# Patient Record
Sex: FEMALE | Race: WHITE | Employment: UNEMPLOYED | URBAN - METROPOLITAN AREA
[De-identification: names, ages, dates, MRNs, and addresses within clinical notes are randomized per-mention and may not be internally consistent; named-entity substitution may affect disease eponyms.]

---

## 2017-05-09 ENCOUNTER — ANESTHESIA (OUTPATIENT)
Dept: ENDOSCOPY | Age: 82
End: 2017-05-09
Payer: MEDICARE

## 2017-05-09 ENCOUNTER — ANESTHESIA EVENT (OUTPATIENT)
Dept: ENDOSCOPY | Age: 82
End: 2017-05-09
Payer: MEDICARE

## 2017-05-09 ENCOUNTER — HOSPITAL ENCOUNTER (OUTPATIENT)
Age: 82
Setting detail: OUTPATIENT SURGERY
Discharge: HOME | End: 2017-05-09
Attending: INTERNAL MEDICINE | Admitting: INTERNAL MEDICINE
Payer: MEDICARE

## 2017-05-09 VITALS
SYSTOLIC BLOOD PRESSURE: 132 MMHG | DIASTOLIC BLOOD PRESSURE: 64 MMHG | OXYGEN SATURATION: 100 % | RESPIRATION RATE: 19 BRPM

## 2017-05-09 VITALS
DIASTOLIC BLOOD PRESSURE: 65 MMHG | HEART RATE: 70 BPM | WEIGHT: 148 LBS | OXYGEN SATURATION: 96 % | SYSTOLIC BLOOD PRESSURE: 125 MMHG | BODY MASS INDEX: 26.22 KG/M2 | HEIGHT: 63 IN | TEMPERATURE: 97.3 F | RESPIRATION RATE: 18 BRPM

## 2017-05-09 LAB — POC POTASSIUM: 3.6 MMOL/L (ref 3.5–5.1)

## 2017-05-09 PROCEDURE — 7100000010 HC PHASE II RECOVERY - FIRST 15 MIN: Performed by: INTERNAL MEDICINE

## 2017-05-09 PROCEDURE — 2580000003 HC RX 258: Performed by: INTERNAL MEDICINE

## 2017-05-09 PROCEDURE — 3609017100 HC EGD: Performed by: INTERNAL MEDICINE

## 2017-05-09 PROCEDURE — 2500000003 HC RX 250 WO HCPCS: Performed by: NURSE ANESTHETIST, CERTIFIED REGISTERED

## 2017-05-09 PROCEDURE — 84132 ASSAY OF SERUM POTASSIUM: CPT

## 2017-05-09 PROCEDURE — 6360000002 HC RX W HCPCS: Performed by: NURSE ANESTHETIST, CERTIFIED REGISTERED

## 2017-05-09 PROCEDURE — 7100000011 HC PHASE II RECOVERY - ADDTL 15 MIN: Performed by: INTERNAL MEDICINE

## 2017-05-09 PROCEDURE — 3700000000 HC ANESTHESIA ATTENDED CARE: Performed by: INTERNAL MEDICINE

## 2017-05-09 RX ORDER — LIDOCAINE HYDROCHLORIDE 10 MG/ML
INJECTION, SOLUTION INFILTRATION; PERINEURAL PRN
Status: DISCONTINUED | OUTPATIENT
Start: 2017-05-09 | End: 2017-05-09 | Stop reason: SDUPTHER

## 2017-05-09 RX ORDER — ONDANSETRON 2 MG/ML
INJECTION INTRAMUSCULAR; INTRAVENOUS PRN
Status: DISCONTINUED | OUTPATIENT
Start: 2017-05-09 | End: 2017-05-09 | Stop reason: SDUPTHER

## 2017-05-09 RX ORDER — SODIUM CHLORIDE 9 MG/ML
INJECTION, SOLUTION INTRAVENOUS CONTINUOUS
Status: DISCONTINUED | OUTPATIENT
Start: 2017-05-09 | End: 2017-05-09 | Stop reason: HOSPADM

## 2017-05-09 RX ADMIN — SODIUM CHLORIDE: 9 INJECTION, SOLUTION INTRAVENOUS at 08:29

## 2017-05-09 RX ADMIN — ONDANSETRON 4 MG: 2 INJECTION, SOLUTION INTRAMUSCULAR; INTRAVENOUS at 08:55

## 2017-05-09 RX ADMIN — LIDOCAINE HYDROCHLORIDE 50 MG: 10 INJECTION, SOLUTION INFILTRATION; PERINEURAL at 08:55

## 2017-05-09 RX ADMIN — SODIUM CHLORIDE 30 ML: 9 INJECTION, SOLUTION INTRAVENOUS at 08:12

## 2017-05-09 ASSESSMENT — PAIN SCALES - GENERAL
PAINLEVEL_OUTOF10: 0

## 2017-05-09 ASSESSMENT — PAIN - FUNCTIONAL ASSESSMENT: PAIN_FUNCTIONAL_ASSESSMENT: 0-10

## 2017-12-31 ENCOUNTER — APPOINTMENT (OUTPATIENT)
Dept: GENERAL RADIOLOGY | Age: 82
End: 2017-12-31
Payer: MEDICARE

## 2017-12-31 ENCOUNTER — HOSPITAL ENCOUNTER (OUTPATIENT)
Age: 82
Setting detail: OBSERVATION
Discharge: HOME | End: 2018-01-01
Attending: EMERGENCY MEDICINE | Admitting: EMERGENCY MEDICINE
Payer: MEDICARE

## 2017-12-31 VITALS
RESPIRATION RATE: 16 BRPM | BODY MASS INDEX: 26.05 KG/M2 | SYSTOLIC BLOOD PRESSURE: 134 MMHG | DIASTOLIC BLOOD PRESSURE: 74 MMHG | WEIGHT: 147 LBS | OXYGEN SATURATION: 95 % | HEIGHT: 63 IN | TEMPERATURE: 98.5 F | HEART RATE: 91 BPM

## 2017-12-31 DIAGNOSIS — R55 NEAR SYNCOPE: ICD-10-CM

## 2017-12-31 DIAGNOSIS — R73.9 HYPERGLYCEMIA: ICD-10-CM

## 2017-12-31 DIAGNOSIS — R07.9 CHEST PAIN, UNSPECIFIED TYPE: Primary | ICD-10-CM

## 2017-12-31 LAB
ABSOLUTE EOS #: 0.04 K/UL (ref 0–0.44)
ABSOLUTE IMMATURE GRANULOCYTE: 0.05 K/UL (ref 0–0.3)
ABSOLUTE LYMPH #: 1.33 K/UL (ref 1.1–3.7)
ABSOLUTE MONO #: 0.5 K/UL (ref 0.1–1.2)
ANION GAP SERPL CALCULATED.3IONS-SCNC: 17 MMOL/L (ref 9–17)
BASOPHILS # BLD: 2 % (ref 0–2)
BASOPHILS ABSOLUTE: 0.09 K/UL (ref 0–0.2)
BUN BLDV-MCNC: 27 MG/DL (ref 8–23)
BUN/CREAT BLD: ABNORMAL (ref 9–20)
CALCIUM SERPL-MCNC: 8.8 MG/DL (ref 8.6–10.4)
CHLORIDE BLD-SCNC: 95 MMOL/L (ref 98–107)
CO2: 25 MMOL/L (ref 20–31)
CREAT SERPL-MCNC: 1.29 MG/DL (ref 0.5–0.9)
DIFFERENTIAL TYPE: ABNORMAL
EOSINOPHILS RELATIVE PERCENT: 1 % (ref 1–4)
GFR AFRICAN AMERICAN: 48 ML/MIN
GFR NON-AFRICAN AMERICAN: 39 ML/MIN
GFR SERPL CREATININE-BSD FRML MDRD: ABNORMAL ML/MIN/{1.73_M2}
GFR SERPL CREATININE-BSD FRML MDRD: ABNORMAL ML/MIN/{1.73_M2}
GLUCOSE BLD-MCNC: 159 MG/DL (ref 70–99)
HCT VFR BLD CALC: 38.5 % (ref 36.3–47.1)
HEMOGLOBIN: 12.4 G/DL (ref 11.9–15.1)
IMMATURE GRANULOCYTES: 1 %
LYMPHOCYTES # BLD: 23 % (ref 24–43)
MCH RBC QN AUTO: 30.4 PG (ref 25.2–33.5)
MCHC RBC AUTO-ENTMCNC: 32.2 G/DL (ref 28.4–34.8)
MCV RBC AUTO: 94.4 FL (ref 82.6–102.9)
MONOCYTES # BLD: 9 % (ref 3–12)
PDW BLD-RTO: 12.8 % (ref 11.8–14.4)
PLATELET # BLD: 247 K/UL (ref 138–453)
PLATELET ESTIMATE: ABNORMAL
PMV BLD AUTO: 10.2 FL (ref 8.1–13.5)
POC TROPONIN I: 0 NG/ML (ref 0–0.1)
POC TROPONIN I: 0 NG/ML (ref 0–0.1)
POC TROPONIN INTERP: NORMAL
POC TROPONIN INTERP: NORMAL
POTASSIUM SERPL-SCNC: 3.7 MMOL/L (ref 3.7–5.3)
RBC # BLD: 4.08 M/UL (ref 3.95–5.11)
RBC # BLD: ABNORMAL 10*6/UL
SEG NEUTROPHILS: 64 % (ref 36–65)
SEGMENTED NEUTROPHILS ABSOLUTE COUNT: 3.67 K/UL (ref 1.5–8.1)
SODIUM BLD-SCNC: 137 MMOL/L (ref 135–144)
WBC # BLD: 5.7 K/UL (ref 3.5–11.3)
WBC # BLD: ABNORMAL 10*3/UL

## 2017-12-31 PROCEDURE — G0378 HOSPITAL OBSERVATION PER HR: HCPCS

## 2017-12-31 PROCEDURE — 93005 ELECTROCARDIOGRAM TRACING: CPT

## 2017-12-31 PROCEDURE — 2580000003 HC RX 258: Performed by: EMERGENCY MEDICINE

## 2017-12-31 PROCEDURE — 85025 COMPLETE CBC W/AUTO DIFF WBC: CPT

## 2017-12-31 PROCEDURE — 84484 ASSAY OF TROPONIN QUANT: CPT

## 2017-12-31 PROCEDURE — 6370000000 HC RX 637 (ALT 250 FOR IP): Performed by: EMERGENCY MEDICINE

## 2017-12-31 PROCEDURE — 71020 XR CHEST STANDARD TWO VW: CPT

## 2017-12-31 PROCEDURE — 80048 BASIC METABOLIC PNL TOTAL CA: CPT

## 2017-12-31 PROCEDURE — 99285 EMERGENCY DEPT VISIT HI MDM: CPT

## 2017-12-31 PROCEDURE — 94762 N-INVAS EAR/PLS OXIMTRY CONT: CPT

## 2017-12-31 RX ORDER — HYDROCHLOROTHIAZIDE 25 MG/1
25 TABLET ORAL DAILY
Status: DISCONTINUED | OUTPATIENT
Start: 2017-12-31 | End: 2018-01-01

## 2017-12-31 RX ORDER — NORTRIPTYLINE HYDROCHLORIDE 10 MG/1
10 CAPSULE ORAL NIGHTLY
Status: DISCONTINUED | OUTPATIENT
Start: 2017-12-31 | End: 2018-01-01 | Stop reason: HOSPADM

## 2017-12-31 RX ORDER — AMLODIPINE BESYLATE 10 MG/1
10 TABLET ORAL DAILY
Status: DISCONTINUED | OUTPATIENT
Start: 2017-12-31 | End: 2018-01-01 | Stop reason: HOSPADM

## 2017-12-31 RX ORDER — ONDANSETRON 4 MG/1
4 TABLET, FILM COATED ORAL EVERY 8 HOURS PRN
Status: DISCONTINUED | OUTPATIENT
Start: 2017-12-31 | End: 2018-01-01 | Stop reason: HOSPADM

## 2017-12-31 RX ORDER — ALPRAZOLAM 0.25 MG/1
0.25 TABLET ORAL NIGHTLY PRN
Status: DISCONTINUED | OUTPATIENT
Start: 2017-12-31 | End: 2018-01-01 | Stop reason: HOSPADM

## 2017-12-31 RX ORDER — LANOLIN ALCOHOL/MO/W.PET/CERES
325 CREAM (GRAM) TOPICAL 2 TIMES DAILY WITH MEALS
Status: DISCONTINUED | OUTPATIENT
Start: 2017-12-31 | End: 2018-01-01 | Stop reason: HOSPADM

## 2017-12-31 RX ORDER — ALPRAZOLAM 0.5 MG/1
0.25 TABLET ORAL NIGHTLY PRN
COMMUNITY

## 2017-12-31 RX ORDER — SODIUM CHLORIDE 0.9 % (FLUSH) 0.9 %
10 SYRINGE (ML) INJECTION PRN
Status: DISCONTINUED | OUTPATIENT
Start: 2017-12-31 | End: 2018-01-01 | Stop reason: HOSPADM

## 2017-12-31 RX ORDER — SODIUM CHLORIDE 0.9 % (FLUSH) 0.9 %
10 SYRINGE (ML) INJECTION EVERY 12 HOURS SCHEDULED
Status: DISCONTINUED | OUTPATIENT
Start: 2017-12-31 | End: 2018-01-01 | Stop reason: HOSPADM

## 2017-12-31 RX ORDER — ACETAMINOPHEN 325 MG/1
650 TABLET ORAL EVERY 4 HOURS PRN
Status: DISCONTINUED | OUTPATIENT
Start: 2017-12-31 | End: 2018-01-01 | Stop reason: HOSPADM

## 2017-12-31 RX ADMIN — FERROUS SULFATE TAB EC 325 MG (65 MG FE EQUIVALENT) 325 MG: 325 (65 FE) TABLET DELAYED RESPONSE at 18:23

## 2017-12-31 RX ADMIN — NORTRIPTYLINE HYDROCHLORIDE 10 MG: 10 CAPSULE ORAL at 22:55

## 2017-12-31 RX ADMIN — SODIUM CHLORIDE: 9 INJECTION, SOLUTION INTRAVENOUS at 16:35

## 2017-12-31 ASSESSMENT — HEART SCORE: ECG: 1

## 2017-12-31 NOTE — ED PROVIDER NOTES
[facility] ED  Emergency Department Encounter  Emergency Medicine Resident     Pt Name: Bethany Mills  MRN: [id number]  [place] 1934  Date of evaluation: 12/31/17  PCP:  Bruce Su MD    CHIEF COMPLAINT       Chief Complaint   Patient presents with    Chest Pain     onset this morning while getting ready to go to [employer]. it only last a few minutes. HISTORY OF PRESENT ILLNESS  (Location/Symptom, Timing/Onset, Context/Setting, Quality, Duration, Modifying Factors, Severity.)      Bethany Mills is a 80 y.o. female who presents with Complaints of pressure-like chest pain that started approximately 9:00 this morning. Patient said her episode lasted approximately 3 minutes and then resolve spontaneously. He had no alleviating or exacerbating factors. Patient says that she was sitting at home when the pain started. She denied any pain radiation. She denied any associated shortness of breath, nausea, vomiting, or abdominal pain. She denies any recent illness, cough, fevers, or chills. She has no significant past cardiac history. She was noted to have a normal stress test in 2014. She has a past medical history significant for hyperlipidemia and hypertension as well as chronic gastritis. Daughter states the patient has been having episodes of dizziness and near syncope over the past few days as well. PAST MEDICAL / SURGICAL / SOCIAL / FAMILY HISTORY      has a past medical history of Anemia; Anxiety; Depression; Fibromyalgia; Gastritis; GERD (gastroesophageal reflux disease); Hyperlipidemia; Hypertension; Lumbar disc disease; Neuropathy ([address] Numbness; Osteoarthritis; Watermelon stomach; and Wears glasses. has a past surgical history that includes Cholecystectomy; nasal endoscopy; Hemorrhoid surgery; back surgery (6/17/13); Cataract removal; Upper gastrointestinal endoscopy; and esophagogastroduodenoscopy transoral diagnostic (N/A, 5/9/2017).     Social History patient to ETU for cardiology workup and consultation and likely stress testing. Patient was agreeable to plan for admission. All questions answered. PROCEDURES:  None    Procedures    CONSULTS:  IP CONSULT TO CARDIOLOGY    CRITICAL CARE:  None     FINAL IMPRESSION      1. Chest pain, unspecified type    2. Near syncope    3.  Hyperglycemia          DISPOSITION / PLAN     DISPOSITION Admitted 12/31/2017 01:17:46 PM      PATIENT REFERRED TO:  Lise Buerger, MD  . [facility], [facility]  [phone]            DISCHARGE MEDICATIONS:  New Prescriptions    No medications on file       Jerry Treviño MD  Emergency Medicine Resident    (Please note that portions of this note were completed with a voice recognition program.  Efforts were made to edit the dictations but occasionally words are mis-transcribed.)       Jerry Treviño MD  Resident  12/31/17 8442

## 2018-01-01 PROCEDURE — 2580000003 HC RX 258: Performed by: EMERGENCY MEDICINE

## 2018-01-01 PROCEDURE — 93005 ELECTROCARDIOGRAM TRACING: CPT

## 2018-01-01 PROCEDURE — G0378 HOSPITAL OBSERVATION PER HR: HCPCS

## 2018-01-01 PROCEDURE — 6370000000 HC RX 637 (ALT 250 FOR IP): Performed by: EMERGENCY MEDICINE

## 2018-01-01 RX ORDER — HYDROCHLOROTHIAZIDE 25 MG/1
12.5 TABLET ORAL DAILY
Status: DISCONTINUED | OUTPATIENT
Start: 2018-01-02 | End: 2018-01-01 | Stop reason: HOSPADM

## 2018-01-01 RX ADMIN — Medication 10 ML: at 09:24

## 2018-01-01 RX ADMIN — SODIUM CHLORIDE: 9 INJECTION, SOLUTION INTRAVENOUS at 02:01

## 2018-01-01 RX ADMIN — HYDROCHLOROTHIAZIDE 25 MG: 25 TABLET ORAL at 09:21

## 2018-01-01 RX ADMIN — AMLODIPINE BESYLATE 10 MG: 10 TABLET ORAL at 09:21

## 2018-01-01 RX ADMIN — FERROUS SULFATE TAB EC 325 MG (65 MG FE EQUIVALENT) 325 MG: 325 (65 FE) TABLET DELAYED RESPONSE at 09:21

## 2018-01-01 NOTE — PLAN OF CARE
Problem: Daily Care:  Goal: Daily care needs are met  Daily care needs are met   Outcome: Ongoing      Problem: Pain:  Goal: Patient's pain/discomfort is manageable  Patient's pain/discomfort is manageable   Outcome: Ongoing

## 2018-01-01 NOTE — H&P
12/31/2017  EXAMINATION: TWO VIEWS OF THE CHEST 12/31/2017 11:15 am COMPARISON: Chest x-ray dated 02/25/2016 HISTORY: ORDERING SYSTEM PROVIDED HISTORY: Chest pain TECHNOLOGIST PROVIDED HISTORY: Reason for exam:->Chest pain FINDINGS: Cardiomediastinal silhouette and pulmonary vasculature are within normal limits. No focal airspace consolidation, pneumothorax, or pleural effusion. No free air beneath the diaphragm. No acute osseous abnormality. No acute intrathoracic process. LABS:  I have reviewed and interpreted all available lab results.   Labs Reviewed   CBC WITH AUTO DIFFERENTIAL - Abnormal; Notable for the following:        Result Value    Lymphocytes 23 (*)     Immature Granulocytes 1 (*)     All other components within normal limits   BASIC METABOLIC PANEL - Abnormal; Notable for the following:     Glucose 159 (*)     BUN 27 (*)     CREATININE 1.29 (*)     Chloride 95 (*)     GFR Non- 39 (*)     GFR  48 (*)     All other components within normal limits   POCT TROPONIN   POCT TROPONIN   POCT TROPONIN   POCT TROPONIN           SCREENING TOOLS:    HEART Risk Score for Chest Pain Patients   History and Physical Exam Suspicion Level  (Nausea, Vomiting, Diaphoresis, Radiation, Exertion)   Slightly Suspicious (0 pts)   Moderately Suspicious (1 pt)   Highly Suspicious (2 pts)   EKG Interpretation   Normal (0 pts)   Non-Specific Repolarization Disturbance (1 pt)   Significant ST-Depression (2 pts)   Age of Patient (in years)   = 39 (0 pts)   46-64 (1 pt)   = 65 (2 pts)   Risk Factors   No Risk Factors (0 pts)   1-2 Risk Factors (1 pt)   = 3 Risk Factors (2 pts)   Risk Factors Include:   Hypercholesterolemia   Hypertension   Diabetes Mellitus   Cigarette smoking   Positive family history   Obesity   CAD   (SLE, CKDz, HIV, Cocaine abuse)   Troponin Levels   = Normal Limit (0 pts)   1-3 Times Normal Limit (1 pt)   > 3 Times Normal Limit (2 pts)  TOTAL: 6    Percent Risk for Major

## 2018-01-01 NOTE — DISCHARGE SUMMARY
CDU Discharge Summary        Patient:  Juan Pablo Velasquez  YOB: 1934    MRN: [id number]   Acct: [de-identified]    Primary Care Physician: Soraya Lara MD    Admit date:  12/31/2017 12/31/2017 10:39 AM  Discharge date:  1/1/2018 1/1/2018  3:54 PM     Discharge Diagnoses:   Acute retrosternal chest pressure due to unspecified etiology,  that lasted approximately 30 minutes and resolve spontaneously, the patient will follow up outpatient with cardiology       Stressed to patient the importance of following up with primary care doctor for further workup/management of symptoms. Pt verbalizes understanding and agrees with plan. Discharge Medications:       [facility]   Home Medication Instructions [id number]    Printed on:01/02/18 6777   Medication Information                      ALPRAZolam (XANAX) 0.25 MG tablet  Take 0.25 mg by mouth nightly as needed for Sleep. amLODIPine (NORVASC) 10 MG tablet  Take 10 mg by mouth daily             ferrous sulfate 325 (65 FE) MG tablet  Take 1 tablet by mouth 2 times daily (with meals)             hydrochlorothiazide (HYDRODIURIL) 25 MG tablet  Take 1 tablet by mouth daily. nortriptyline (PAMELOR) 10 MG capsule  Take 10 mg by mouth nightly. potassium chloride SA (K-DUR;KLOR-CON M) 20 MEQ tablet  Take 1 tablet by mouth daily for 10 days                 Diet:    , Advance as tolerated     Activity:  As tolerated    Follow-up:  Call today/tomorrow for a follow up appointment with Soraya Lara MD , other or return to the Emergency Room with worsening symptoms    Consultants: IP CONSULT TO CARDIOLOGY    Procedures:  Not indicated     Diagnostic Test:           Physical Exam:    General appearance - NAD, AOx 3  Lungs - CTA Bilat  Heart - RRR no M/G/R  Abdomen - Soft NT/ND  Neurological -  No focal motor or sensory weakness. Extremities - Cap refil <2 sec in all ext.   Skin - warm, dry      Hospital Course:  Clinical course

## 2018-01-02 LAB
EKG ATRIAL RATE: 101 BPM
EKG ATRIAL RATE: 90 BPM
EKG ATRIAL RATE: 97 BPM
EKG P AXIS: 59 DEGREES
EKG P AXIS: 70 DEGREES
EKG P AXIS: 71 DEGREES
EKG P-R INTERVAL: 146 MS
EKG P-R INTERVAL: 148 MS
EKG P-R INTERVAL: 154 MS
EKG Q-T INTERVAL: 376 MS
EKG Q-T INTERVAL: 378 MS
EKG Q-T INTERVAL: 386 MS
EKG QRS DURATION: 92 MS
EKG QRS DURATION: 92 MS
EKG QRS DURATION: 96 MS
EKG QTC CALCULATION (BAZETT): 472 MS
EKG QTC CALCULATION (BAZETT): 477 MS
EKG QTC CALCULATION (BAZETT): 490 MS
EKG R AXIS: 56 DEGREES
EKG R AXIS: 56 DEGREES
EKG R AXIS: 78 DEGREES
EKG T AXIS: 16 DEGREES
EKG T AXIS: 26 DEGREES
EKG T AXIS: 56 DEGREES
EKG VENTRICULAR RATE: 101 BPM
EKG VENTRICULAR RATE: 90 BPM
EKG VENTRICULAR RATE: 97 BPM

## 2018-07-15 ENCOUNTER — APPOINTMENT (OUTPATIENT)
Dept: CT IMAGING | Age: 83
End: 2018-07-15
Payer: MEDICARE

## 2018-07-15 ENCOUNTER — APPOINTMENT (OUTPATIENT)
Dept: GENERAL RADIOLOGY | Age: 83
End: 2018-07-15
Payer: MEDICARE

## 2018-07-15 ENCOUNTER — HOSPITAL ENCOUNTER (EMERGENCY)
Age: 83
Discharge: HOME | End: 2018-07-15
Attending: EMERGENCY MEDICINE
Payer: MEDICARE

## 2018-07-15 VITALS
OXYGEN SATURATION: 99 % | DIASTOLIC BLOOD PRESSURE: 82 MMHG | HEIGHT: 64 IN | HEART RATE: 97 BPM | BODY MASS INDEX: 25.61 KG/M2 | WEIGHT: 150 LBS | SYSTOLIC BLOOD PRESSURE: 146 MMHG | TEMPERATURE: 98.6 F | RESPIRATION RATE: 14 BRPM

## 2018-07-15 DIAGNOSIS — I10 HYPERTENSION, UNSPECIFIED TYPE: ICD-10-CM

## 2018-07-15 DIAGNOSIS — R42 LIGHTHEADED: Primary | ICD-10-CM

## 2018-07-15 DIAGNOSIS — K62.5 RECTAL BLEEDING: ICD-10-CM

## 2018-07-15 DIAGNOSIS — R11.0 NAUSEA: ICD-10-CM

## 2018-07-15 LAB
-: ABNORMAL
ABSOLUTE EOS #: 0.04 K/UL (ref 0–0.44)
ABSOLUTE IMMATURE GRANULOCYTE: 0.07 K/UL (ref 0–0.3)
ABSOLUTE LYMPH #: 1.62 K/UL (ref 1.1–3.7)
ABSOLUTE MONO #: 0.67 K/UL (ref 0.1–1.2)
AMORPHOUS: ABNORMAL
ANION GAP SERPL CALCULATED.3IONS-SCNC: 14 MMOL/L (ref 9–17)
BACTERIA: ABNORMAL
BASOPHILS # BLD: 1 % (ref 0–2)
BASOPHILS ABSOLUTE: 0.09 K/UL (ref 0–0.2)
BILIRUBIN URINE: NEGATIVE
BUN BLDV-MCNC: 22 MG/DL (ref 8–23)
BUN/CREAT BLD: ABNORMAL (ref 9–20)
CALCIUM SERPL-MCNC: 9.3 MG/DL (ref 8.6–10.4)
CASTS UA: ABNORMAL /LPF (ref 0–8)
CHLORIDE BLD-SCNC: 98 MMOL/L (ref 98–107)
CO2: 25 MMOL/L (ref 20–31)
COLOR: YELLOW
CREAT SERPL-MCNC: 1.13 MG/DL (ref 0.5–0.9)
CRYSTALS, UA: ABNORMAL /HPF
DIFFERENTIAL TYPE: ABNORMAL
EKG ATRIAL RATE: 108 BPM
EKG P AXIS: 57 DEGREES
EKG P-R INTERVAL: 150 MS
EKG Q-T INTERVAL: 364 MS
EKG QRS DURATION: 106 MS
EKG QTC CALCULATION (BAZETT): 487 MS
EKG R AXIS: 67 DEGREES
EKG T AXIS: 22 DEGREES
EKG VENTRICULAR RATE: 108 BPM
EOSINOPHILS RELATIVE PERCENT: 1 % (ref 1–4)
EPITHELIAL CELLS UA: ABNORMAL /HPF (ref 0–5)
GFR AFRICAN AMERICAN: 56 ML/MIN
GFR NON-AFRICAN AMERICAN: 46 ML/MIN
GFR SERPL CREATININE-BSD FRML MDRD: ABNORMAL ML/MIN/{1.73_M2}
GFR SERPL CREATININE-BSD FRML MDRD: ABNORMAL ML/MIN/{1.73_M2}
GLUCOSE BLD-MCNC: 167 MG/DL (ref 70–99)
GLUCOSE URINE: NEGATIVE
HCT VFR BLD CALC: 36.5 % (ref 36.3–47.1)
HEMOGLOBIN: 12 G/DL (ref 11.9–15.1)
IMMATURE GRANULOCYTES: 1 %
INR BLD: 0.9
KETONES, URINE: NEGATIVE
LEUKOCYTE ESTERASE, URINE: ABNORMAL
LYMPHOCYTES # BLD: 23 % (ref 24–43)
MCH RBC QN AUTO: 31 PG (ref 25.2–33.5)
MCHC RBC AUTO-ENTMCNC: 32.9 G/DL (ref 28.4–34.8)
MCV RBC AUTO: 94.3 FL (ref 82.6–102.9)
MONOCYTES # BLD: 9 % (ref 3–12)
MUCUS: ABNORMAL
NITRITE, URINE: NEGATIVE
NRBC AUTOMATED: 0 PER 100 WBC
OTHER OBSERVATIONS UA: ABNORMAL
PARTIAL THROMBOPLASTIN TIME: 22.3 SEC (ref 20.5–30.5)
PDW BLD-RTO: 12.8 % (ref 11.8–14.4)
PH UA: 7.5 (ref 5–8)
PLATELET # BLD: 238 K/UL (ref 138–453)
PLATELET ESTIMATE: ABNORMAL
PMV BLD AUTO: 10.4 FL (ref 8.1–13.5)
POC TROPONIN I: 0 NG/ML (ref 0–0.1)
POC TROPONIN I: 0 NG/ML (ref 0–0.1)
POC TROPONIN INTERP: NORMAL
POC TROPONIN INTERP: NORMAL
POTASSIUM SERPL-SCNC: 3.8 MMOL/L (ref 3.7–5.3)
PROTEIN UA: NEGATIVE
PROTHROMBIN TIME: 10 SEC (ref 9–12)
RBC # BLD: 3.87 M/UL (ref 3.95–5.11)
RBC # BLD: ABNORMAL 10*6/UL
RBC UA: ABNORMAL /HPF (ref 0–4)
RENAL EPITHELIAL, UA: ABNORMAL /HPF
SEG NEUTROPHILS: 65 % (ref 36–65)
SEGMENTED NEUTROPHILS ABSOLUTE COUNT: 4.61 K/UL (ref 1.5–8.1)
SODIUM BLD-SCNC: 137 MMOL/L (ref 135–144)
SPECIFIC GRAVITY UA: 1 (ref 1–1.03)
TRICHOMONAS: ABNORMAL
TURBIDITY: CLEAR
URINE HGB: NEGATIVE
UROBILINOGEN, URINE: NORMAL
WBC # BLD: 7.1 K/UL (ref 3.5–11.3)
WBC # BLD: ABNORMAL 10*3/UL
WBC UA: ABNORMAL /HPF (ref 0–5)
YEAST: ABNORMAL

## 2018-07-15 PROCEDURE — 85610 PROTHROMBIN TIME: CPT

## 2018-07-15 PROCEDURE — 81001 URINALYSIS AUTO W/SCOPE: CPT

## 2018-07-15 PROCEDURE — 87086 URINE CULTURE/COLONY COUNT: CPT

## 2018-07-15 PROCEDURE — 85730 THROMBOPLASTIN TIME PARTIAL: CPT

## 2018-07-15 PROCEDURE — 80048 BASIC METABOLIC PNL TOTAL CA: CPT

## 2018-07-15 PROCEDURE — 99285 EMERGENCY DEPT VISIT HI MDM: CPT

## 2018-07-15 PROCEDURE — 93005 ELECTROCARDIOGRAM TRACING: CPT

## 2018-07-15 PROCEDURE — 85025 COMPLETE CBC W/AUTO DIFF WBC: CPT

## 2018-07-15 PROCEDURE — 2580000003 HC RX 258: Performed by: EMERGENCY MEDICINE

## 2018-07-15 PROCEDURE — 70450 CT HEAD/BRAIN W/O DYE: CPT

## 2018-07-15 PROCEDURE — 70496 CT ANGIOGRAPHY HEAD: CPT

## 2018-07-15 PROCEDURE — 70498 CT ANGIOGRAPHY NECK: CPT

## 2018-07-15 PROCEDURE — 84484 ASSAY OF TROPONIN QUANT: CPT

## 2018-07-15 PROCEDURE — 71046 X-RAY EXAM CHEST 2 VIEWS: CPT

## 2018-07-15 PROCEDURE — 6360000004 HC RX CONTRAST MEDICATION: Performed by: EMERGENCY MEDICINE

## 2018-07-15 RX ORDER — 0.9 % SODIUM CHLORIDE 0.9 %
1000 INTRAVENOUS SOLUTION INTRAVENOUS ONCE
Status: COMPLETED | OUTPATIENT
Start: 2018-07-15 | End: 2018-07-15

## 2018-07-15 RX ADMIN — SODIUM CHLORIDE 1000 ML: 9 INJECTION, SOLUTION INTRAVENOUS at 16:12

## 2018-07-15 RX ADMIN — IOPAMIDOL 90 ML: 755 INJECTION, SOLUTION INTRAVENOUS at 15:10

## 2018-07-15 NOTE — ED NOTES
Pt to ED c/o dizziness and rectal bleeding. Pt stating she has been feeling very fatigued and dizzy over the past several months and believes her hemoglobin is low but was told by her doctor that she had a inner ear infection. Pt reporting her dizziness caused a fall where she landed on her left hip, pt denying any pain at this time. Pt stating she noticed dark tarry stools his morning. Pt also reporting she wakes up every morning and is extremely nauseated, pt denies any emesis. Pt placed on cardiac monitor, bp cuff, and pulse ox. Pt resting on cart with call light within reach. NAD noted, RR even and NL.  Will continue to monitor     Jennifer Hernandez RN  07/15/18 8403

## 2018-07-15 NOTE — ED NOTES
Pt resting on cart with call light within reach. NAD noted, RR even and NL. Family remains at bedside, will continue to monitor.      Mercedez Marquez RN  07/15/18 4089

## 2018-07-15 NOTE — ED NOTES
Pt resting on cart with call light within reach. NAD noted, RR even and NL.  Will continue to monitor     Kelsie Landaverde RN  07/15/18 9663

## 2018-07-15 NOTE — ED PROVIDER NOTES
[facility]     Emergency Department     Faculty Attestation    I performed a history and physical examination of the patient and discussed management with the resident. I have reviewed and agree with the residents findings including all diagnostic interpretations, and treatment plans as written. Any areas of disagreement are noted on the chart. I was personally present for the key portions of any procedures. I have documented in the chart those procedures where I was not present during the key portions. I have reviewed the emergency nurses triage note. I agree with the chief complaint, past medical history, past surgical history, allergies, medications, social and family history as documented unless otherwise noted below. Documentation of the HPI, Physical Exam and Medical Decision Making performed by scriberica is based on my personal performance of the HPI, PE and MDM. For Physician Assistant/ Nurse Practitioner cases/documentation I have personally evaluated this patient and have completed at least one if not all key elements of the E/M (history, physical exam, and MDM). Additional findings are as noted. Primary Care Physician: Rosi Denise MD    History: This is a 80 y.o. female who presents to the Emergency Department with complaint of Dizziness. The patient is complaining of dizziness and lightheadedness that have been ongoing for last 1-1/2-2 months. This morning the patient noted some very dark stools of which she's had in the past. The patient denies any chest pain or shortness of breath. The patient denies any aspiration diarrhea. Physical:   height is 5' 4\" (1.626 m) and weight is 150 lb (68 kg). Her oral temperature is 98.6 °F (37 °C). Her blood pressure is 163/70 (abnormal) and her pulse is 104. Her respiration is 20 and oxygen saturation is 100%.   Awake alert nontoxic-appearing chest moving all extremities    Impression: Weakness    Plan:

## 2018-07-15 NOTE — ED PROVIDER NOTES
C/O POSS MELENIC STOOLS AND LIGHTHEADEDNESS, SOB, PALPITATIONS, NAUSEA SINCE YEST. HX OF PREV GI BLEEDING. H/H OK. CT OF BRAIN DONE. REMAINDER OF LABS REVIEWED-MILD RENAL INSUFFICIENCY. CTA OF HEAD/NECK PENDING. GUAIAC NEG STOOL. NONFOCAL NEURO EXAM. DISPO TBD.       Luzma Valdivia MD  07/15/18 8142

## 2018-07-16 LAB
CULTURE: NORMAL
Lab: NORMAL
SPECIMEN DESCRIPTION: NORMAL
STATUS: NORMAL

## 2018-10-31 ENCOUNTER — APPOINTMENT (OUTPATIENT)
Dept: GENERAL RADIOLOGY | Age: 83
End: 2018-10-31
Payer: MEDICARE

## 2018-10-31 ENCOUNTER — HOSPITAL ENCOUNTER (OUTPATIENT)
Age: 83
Setting detail: OBSERVATION
Discharge: HOME | End: 2018-11-01
Attending: EMERGENCY MEDICINE | Admitting: EMERGENCY MEDICINE
Payer: MEDICARE

## 2018-10-31 DIAGNOSIS — R07.9 CHEST PAIN, UNSPECIFIED TYPE: Primary | ICD-10-CM

## 2018-10-31 LAB
ABSOLUTE EOS #: 0.03 K/UL (ref 0–0.44)
ABSOLUTE IMMATURE GRANULOCYTE: 0.04 K/UL (ref 0–0.3)
ABSOLUTE LYMPH #: 2 K/UL (ref 1.1–3.7)
ABSOLUTE MONO #: 0.64 K/UL (ref 0.1–1.2)
ANION GAP SERPL CALCULATED.3IONS-SCNC: 12 MMOL/L (ref 9–17)
BASOPHILS # BLD: 2 % (ref 0–2)
BASOPHILS ABSOLUTE: 0.09 K/UL (ref 0–0.2)
BUN BLDV-MCNC: 24 MG/DL (ref 8–23)
BUN/CREAT BLD: ABNORMAL (ref 9–20)
CALCIUM SERPL-MCNC: 9.4 MG/DL (ref 8.6–10.4)
CHLORIDE BLD-SCNC: 95 MMOL/L (ref 98–107)
CO2: 29 MMOL/L (ref 20–31)
CREAT SERPL-MCNC: 1.25 MG/DL (ref 0.5–0.9)
DIFFERENTIAL TYPE: ABNORMAL
EOSINOPHILS RELATIVE PERCENT: 1 % (ref 1–4)
GFR AFRICAN AMERICAN: 49 ML/MIN
GFR NON-AFRICAN AMERICAN: 41 ML/MIN
GFR SERPL CREATININE-BSD FRML MDRD: ABNORMAL ML/MIN/{1.73_M2}
GFR SERPL CREATININE-BSD FRML MDRD: ABNORMAL ML/MIN/{1.73_M2}
GLUCOSE BLD-MCNC: 92 MG/DL (ref 70–99)
HCT VFR BLD CALC: 37.6 % (ref 36.3–47.1)
HEMOGLOBIN: 11.8 G/DL (ref 11.9–15.1)
IMMATURE GRANULOCYTES: 1 %
LYMPHOCYTES # BLD: 34 % (ref 24–43)
MCH RBC QN AUTO: 29.5 PG (ref 25.2–33.5)
MCHC RBC AUTO-ENTMCNC: 31.4 G/DL (ref 28.4–34.8)
MCV RBC AUTO: 94 FL (ref 82.6–102.9)
MONOCYTES # BLD: 11 % (ref 3–12)
NRBC AUTOMATED: 0 PER 100 WBC
PDW BLD-RTO: 12.8 % (ref 11.8–14.4)
PLATELET # BLD: 322 K/UL (ref 138–453)
PLATELET ESTIMATE: ABNORMAL
PMV BLD AUTO: 9.7 FL (ref 8.1–13.5)
POC TROPONIN I: 0 NG/ML (ref 0–0.1)
POC TROPONIN I: 0 NG/ML (ref 0–0.1)
POC TROPONIN INTERP: NORMAL
POC TROPONIN INTERP: NORMAL
POTASSIUM SERPL-SCNC: 4.1 MMOL/L (ref 3.7–5.3)
RBC # BLD: 4 M/UL (ref 3.95–5.11)
RBC # BLD: ABNORMAL 10*6/UL
SEG NEUTROPHILS: 51 % (ref 36–65)
SEGMENTED NEUTROPHILS ABSOLUTE COUNT: 3.14 K/UL (ref 1.5–8.1)
SODIUM BLD-SCNC: 136 MMOL/L (ref 135–144)
WBC # BLD: 5.9 K/UL (ref 3.5–11.3)
WBC # BLD: ABNORMAL 10*3/UL

## 2018-10-31 PROCEDURE — 80048 BASIC METABOLIC PNL TOTAL CA: CPT

## 2018-10-31 PROCEDURE — 71046 X-RAY EXAM CHEST 2 VIEWS: CPT

## 2018-10-31 PROCEDURE — 84484 ASSAY OF TROPONIN QUANT: CPT

## 2018-10-31 PROCEDURE — 99285 EMERGENCY DEPT VISIT HI MDM: CPT

## 2018-10-31 PROCEDURE — 6370000000 HC RX 637 (ALT 250 FOR IP): Performed by: STUDENT IN AN ORGANIZED HEALTH CARE EDUCATION/TRAINING PROGRAM

## 2018-10-31 PROCEDURE — G0378 HOSPITAL OBSERVATION PER HR: HCPCS

## 2018-10-31 PROCEDURE — 85025 COMPLETE CBC W/AUTO DIFF WBC: CPT

## 2018-10-31 PROCEDURE — 93005 ELECTROCARDIOGRAM TRACING: CPT

## 2018-10-31 PROCEDURE — 2580000003 HC RX 258: Performed by: EMERGENCY MEDICINE

## 2018-10-31 RX ORDER — NORTRIPTYLINE HYDROCHLORIDE 25 MG/1
25 CAPSULE ORAL DAILY
Status: DISCONTINUED | OUTPATIENT
Start: 2018-11-01 | End: 2018-11-01 | Stop reason: HOSPADM

## 2018-10-31 RX ORDER — ALPRAZOLAM 0.25 MG/1
0.25 TABLET ORAL NIGHTLY PRN
Status: DISCONTINUED | OUTPATIENT
Start: 2018-10-31 | End: 2018-11-01 | Stop reason: HOSPADM

## 2018-10-31 RX ORDER — ACETAMINOPHEN 325 MG/1
650 TABLET ORAL EVERY 4 HOURS PRN
Status: DISCONTINUED | OUTPATIENT
Start: 2018-10-31 | End: 2018-11-01 | Stop reason: HOSPADM

## 2018-10-31 RX ORDER — AMLODIPINE BESYLATE 10 MG/1
10 TABLET ORAL DAILY
Status: DISCONTINUED | OUTPATIENT
Start: 2018-11-01 | End: 2018-11-01 | Stop reason: HOSPADM

## 2018-10-31 RX ORDER — SODIUM CHLORIDE 0.9 % (FLUSH) 0.9 %
10 SYRINGE (ML) INJECTION PRN
Status: DISCONTINUED | OUTPATIENT
Start: 2018-10-31 | End: 2018-11-01 | Stop reason: HOSPADM

## 2018-10-31 RX ORDER — TRAMADOL HYDROCHLORIDE 50 MG/1
25 TABLET ORAL EVERY 6 HOURS PRN
Status: DISCONTINUED | OUTPATIENT
Start: 2018-10-31 | End: 2018-11-01 | Stop reason: HOSPADM

## 2018-10-31 RX ORDER — HYDROCHLOROTHIAZIDE 25 MG/1
25 TABLET ORAL DAILY
Status: DISCONTINUED | OUTPATIENT
Start: 2018-11-01 | End: 2018-11-01 | Stop reason: HOSPADM

## 2018-10-31 RX ORDER — SODIUM CHLORIDE 0.9 % (FLUSH) 0.9 %
10 SYRINGE (ML) INJECTION EVERY 12 HOURS SCHEDULED
Status: DISCONTINUED | OUTPATIENT
Start: 2018-10-31 | End: 2018-11-01 | Stop reason: HOSPADM

## 2018-10-31 RX ORDER — ASPIRIN 81 MG/1
324 TABLET, CHEWABLE ORAL ONCE
Status: COMPLETED | OUTPATIENT
Start: 2018-10-31 | End: 2018-10-31

## 2018-10-31 RX ORDER — TRAMADOL HYDROCHLORIDE 50 MG/1
50 TABLET ORAL EVERY 6 HOURS PRN
Status: DISCONTINUED | OUTPATIENT
Start: 2018-10-31 | End: 2018-11-01 | Stop reason: HOSPADM

## 2018-10-31 RX ORDER — NORTRIPTYLINE HYDROCHLORIDE 25 MG/1
10 CAPSULE ORAL DAILY
COMMUNITY

## 2018-10-31 RX ADMIN — Medication 10 ML: at 21:24

## 2018-10-31 RX ADMIN — ASPIRIN 324 MG: 81 TABLET, CHEWABLE ORAL at 14:43

## 2018-10-31 ASSESSMENT — PAIN DESCRIPTION - LOCATION: LOCATION: CHEST

## 2018-10-31 ASSESSMENT — PAIN SCALES - GENERAL
PAINLEVEL_OUTOF10: 0
PAINLEVEL_OUTOF10: 8

## 2018-10-31 ASSESSMENT — PAIN DESCRIPTION - DESCRIPTORS: DESCRIPTORS: ACHING

## 2018-10-31 ASSESSMENT — ENCOUNTER SYMPTOMS
VOMITING: 0
PHOTOPHOBIA: 0
SHORTNESS OF BREATH: 0
BACK PAIN: 0
NAUSEA: 0

## 2018-10-31 ASSESSMENT — PAIN DESCRIPTION - PAIN TYPE: TYPE: ACUTE PAIN

## 2018-10-31 ASSESSMENT — HEART SCORE: ECG: 0

## 2018-10-31 NOTE — ED NOTES
Bed: 29  Expected date:   Expected time:   Means of arrival:   Comments:  MCA Eugena Soulier, RN  10/31/18 8667

## 2018-10-31 NOTE — ED NOTES
Patient resting comfortably. Denies any pain or discomfort. Patient and family updated on plan of care, patient has no questions, will continue to monitor.      Rodo Mccormick RN  10/31/18 2340

## 2018-10-31 NOTE — ED PROVIDER NOTES
normal.   Musculoskeletal:   No posterior calf tenderness, no leg swelling   Neurological: She is alert and oriented to person, place, and time. Nursing note and vitals reviewed. DIFFERENTIAL  DIAGNOSIS     PLAN (LABS / IMAGING / EKG):  Orders Placed This Encounter   Procedures    XR CHEST STANDARD (2 VW)    CBC Auto Differential    Basic Metabolic Panel    POCT troponin    POCT troponin    EKG 12 Lead    Insert peripheral IV    PATIENT STATUS (FROM ED OR OR/PROCEDURAL) Observation       MEDICATIONS ORDERED:  Orders Placed This Encounter   Medications    aspirin chewable tablet 324 mg       DDX: Atypical ACS, less likely but considered is pneumonia, unlikely PE as patient not short of breath and chest pain pressure like, doubt dissection    DIAGNOSTIC RESULTS / EMERGENCY DEPARTMENT COURSE / MDM     LABS:  Results for orders placed or performed during the hospital encounter of 10/31/18   CBC Auto Differential   Result Value Ref Range    WBC 5.9 3.5 - 11.3 k/uL    RBC 4.00 3.95 - 5.11 m/uL    Hemoglobin 11.8 (L) 11.9 - 15.1 g/dL    Hematocrit 37.6 36.3 - 47.1 %    MCV 94.0 82.6 - 102.9 fL    MCH 29.5 25.2 - 33.5 pg    MCHC 31.4 28.4 - 34.8 g/dL    RDW 12.8 11.8 - 14.4 %    Platelets 632 280 - 863 k/uL    MPV 9.7 8.1 - 13.5 fL    NRBC Automated 0.0 0.0 per 100 WBC    Differential Type NOT REPORTED     Seg Neutrophils 51 36 - 65 %    Lymphocytes 34 24 - 43 %    Monocytes 11 3 - 12 %    Eosinophils % 1 1 - 4 %    Basophils 2 0 - 2 %    Immature Granulocytes 1 (H) 0 %    Segs Absolute 3.14 1.50 - 8.10 k/uL    Absolute Lymph # 2.00 1. 10 - 3.70 k/uL    Absolute Mono # 0.64 0.10 - 1.20 k/uL    Absolute Eos # 0.03 0.00 - 0.44 k/uL    Basophils # 0.09 0.00 - 0.20 k/uL    Absolute Immature Granulocyte 0.04 0.00 - 0.30 k/uL    WBC Morphology NOT REPORTED     RBC Morphology NOT REPORTED     Platelet Estimate NOT REPORTED    Basic Metabolic Panel   Result Value Ref Range    Glucose 92 70 - 99 mg/dL    BUN 24 (H) 8 Would not treat at this time given the patient is asymptomatic. Plan for cardiac workup and plan for admission to the observation unit for evaluation by cardiology. Workup negative. Patient updated on plan of care. Patient agreeable to admission for evaluation by cardiology in the morning. PROCEDURES:  None    CONSULTS:  IP CONSULT TO CARDIOLOGY    CRITICALCARE:  None    FINAL IMPRESSION      1.  Chest pain, unspecified type          DISPOSITION / PLAN     DISPOSITION Admitted    PATIENTREFERRED TO:  Antonella Mcconnell MD  [address] [place] [address] [phone]            DISCHARGE MEDICATIONS:  New Prescriptions    No medications on file       Matilde Evans MD  EmergencyMedicine Resident    (Please note that portions of this note were completed with a voice recognition program.  Efforts were made to edit the dictations but occasionally words are mis-transcribed.)       Matilde Evans MD  10/31/18 101 Farrukh Turner MD  10/31/18 8519

## 2018-10-31 NOTE — ED PROVIDER NOTES
[address] ED  Emergency Department  Emergency Medicine Resident Sign-out     Care of Kerri Whyte was assumed from Dr. Susannah Tinajero and is being seen for Chest Pain  . The patient's initial evaluation and plan have been discussed with the prior provider who initially evaluated the patient. EMERGENCY DEPARTMENT COURSE / MEDICAL DECISION MAKING:       MEDICATIONS GIVEN:  Orders Placed This Encounter   Medications    aspirin chewable tablet 324 mg       LABS / RADIOLOGY:     Labs Reviewed   CBC WITH AUTO DIFFERENTIAL - Abnormal; Notable for the following:        Result Value    Hemoglobin 11.8 (*)     Immature Granulocytes 1 (*)     All other components within normal limits   BASIC METABOLIC PANEL - Abnormal; Notable for the following:     BUN 24 (*)     CREATININE 1.25 (*)     Chloride 95 (*)     GFR Non- 41 (*)     GFR  49 (*)     All other components within normal limits   POCT TROPONIN   POCT TROPONIN   POCT TROPONIN       Xr Chest Standard (2 Vw)    Result Date: 10/31/2018  EXAMINATION: TWO VIEWS OF THE CHEST 10/31/2018 3:05 pm COMPARISON: July 15, 2018, December 31, 2017 and February 25, 2016. HISTORY: ORDERING SYSTEM PROVIDED HISTORY: cp TECHNOLOGIST PROVIDED HISTORY: cp FINDINGS: The cardiomediastinal silhouette is unchanged in appearance. A calcified nodular shadow in the right lung apex is again demonstrated. There is no consolidation, pneumothorax, or evidence of edema. No effusion is appreciated. The osseous structures are unchanged in appearance. Unchanged appearance of the chest without acute airspace disease identified. RECENT VITALS:     Temp: 98.3 °F (36.8 °C),  Pulse: 102, Resp: 18, BP: (!) 151/86, SpO2: 100 %    This patient is a 80 y.o. Female with Chest pain. No recent cardiac workup. Negative workup Elevated heart score. Patient admitted to ETU for cardiology evaluation. OUTSTANDING TASKS / RECOMMENDATIONS:    1.  Admitted

## 2018-10-31 NOTE — CARE COORDINATION
Case Management Initial Discharge Plan  Cecilia Ellis,             Met with:patient to discuss discharge plans. Information verified: address, contacts, phone number, , insurance Yes  PCP: Misti Mcbride MD  Date of last visit: 2 weeks ago    Insurance Provider: Medicare and [employer]    Discharge Planning    Living Arrangements:  Spouse/Significant Other   Support Systems:  Spouse/Significant Other, Children, Friends/Neighbors, [employer]    Home has 1 stories  1 stairs to climb to get into front door, none stairs to climb to reach second floor  Location of bedroom/bathroom in home first floor    Patient able to perform ADL's:Independent    Current Services (outpatient & in home) none  DME equipment: none  DME provider: none    Pharmacy: [facility] on [address] mail in \[facility] [place] [facility]""   Potential Assistance Purchasing Medications:  No  Does patient want to participate in local refill/ meds to beds program?  No    Potential Assistance Needed:  N/A    Patient agreeable to home care: Yes  Freedom of choice provided:  n/a    Prior SNF/Rehab Placement and Facility: no  Agreeable to SNF/Rehab: No  [place] of choice provided: n/a   Evaluation: no    Expected Discharge date:     Patient expects to be discharged to:  Home  Follow Up Appointment: Best Day/ Time:      Transportation provider:  will provide transportation home. Transportation arrangements needed for discharge: No    Readmission Risk              Risk of Unplanned Readmission:        0             Does patient have a readmission risk score greater than 14?: No  If yes, follow-up appointment must be made within 7 days of discharge. Discharge Plan: Discharge home independent with established PCP.  will provide transportation home.           Electronically signed by Bee Morales RN on 10/31/18 at 4:40 PM

## 2018-10-31 NOTE — ED NOTES
Report to Adriel Jansen on [address] in the room to transport patient.        Cresencio Manjarrez RN  10/31/18 2192

## 2018-11-01 ENCOUNTER — APPOINTMENT (OUTPATIENT)
Dept: NUCLEAR MEDICINE | Age: 83
End: 2018-11-01
Payer: MEDICARE

## 2018-11-01 VITALS
HEIGHT: 64 IN | DIASTOLIC BLOOD PRESSURE: 71 MMHG | WEIGHT: 146 LBS | TEMPERATURE: 97.5 F | OXYGEN SATURATION: 97 % | RESPIRATION RATE: 18 BRPM | SYSTOLIC BLOOD PRESSURE: 122 MMHG | HEART RATE: 78 BPM | BODY MASS INDEX: 24.92 KG/M2

## 2018-11-01 LAB
EKG ATRIAL RATE: 79 BPM
EKG ATRIAL RATE: 81 BPM
EKG ATRIAL RATE: 95 BPM
EKG P AXIS: 24 DEGREES
EKG P AXIS: 59 DEGREES
EKG P AXIS: 63 DEGREES
EKG P-R INTERVAL: 140 MS
EKG P-R INTERVAL: 146 MS
EKG P-R INTERVAL: 170 MS
EKG Q-T INTERVAL: 388 MS
EKG Q-T INTERVAL: 394 MS
EKG Q-T INTERVAL: 410 MS
EKG QRS DURATION: 96 MS
EKG QRS DURATION: 98 MS
EKG QRS DURATION: 98 MS
EKG QTC CALCULATION (BAZETT): 451 MS
EKG QTC CALCULATION (BAZETT): 476 MS
EKG QTC CALCULATION (BAZETT): 487 MS
EKG R AXIS: 37 DEGREES
EKG R AXIS: 46 DEGREES
EKG R AXIS: 61 DEGREES
EKG T AXIS: 11 DEGREES
EKG T AXIS: 20 DEGREES
EKG T AXIS: 24 DEGREES
EKG VENTRICULAR RATE: 79 BPM
EKG VENTRICULAR RATE: 81 BPM
EKG VENTRICULAR RATE: 95 BPM
LV EF: 70 %
LVEF MODALITY: NORMAL
TROPONIN INTERP: NORMAL
TROPONIN T: <0.03 NG/ML

## 2018-11-01 PROCEDURE — 36415 COLL VENOUS BLD VENIPUNCTURE: CPT

## 2018-11-01 PROCEDURE — G0378 HOSPITAL OBSERVATION PER HR: HCPCS

## 2018-11-01 PROCEDURE — 84484 ASSAY OF TROPONIN QUANT: CPT

## 2018-11-01 PROCEDURE — 6370000000 HC RX 637 (ALT 250 FOR IP): Performed by: EMERGENCY MEDICINE

## 2018-11-01 PROCEDURE — 2580000003 HC RX 258: Performed by: INTERNAL MEDICINE

## 2018-11-01 PROCEDURE — 78452 HT MUSCLE IMAGE SPECT MULT: CPT

## 2018-11-01 PROCEDURE — 3430000000 HC RX DIAGNOSTIC RADIOPHARMACEUTICAL: Performed by: INTERNAL MEDICINE

## 2018-11-01 PROCEDURE — 93017 CV STRESS TEST TRACING ONLY: CPT | Performed by: NURSE PRACTITIONER

## 2018-11-01 PROCEDURE — 93005 ELECTROCARDIOGRAM TRACING: CPT

## 2018-11-01 PROCEDURE — A9500 TC99M SESTAMIBI: HCPCS | Performed by: INTERNAL MEDICINE

## 2018-11-01 RX ORDER — SODIUM CHLORIDE 0.9 % (FLUSH) 0.9 %
10 SYRINGE (ML) INJECTION PRN
Status: DISCONTINUED | OUTPATIENT
Start: 2018-11-01 | End: 2018-11-01

## 2018-11-01 RX ORDER — SODIUM CHLORIDE 0.9 % (FLUSH) 0.9 %
10 SYRINGE (ML) INJECTION PRN
Status: DISCONTINUED | OUTPATIENT
Start: 2018-11-01 | End: 2018-11-01 | Stop reason: HOSPADM

## 2018-11-01 RX ORDER — METOPROLOL TARTRATE 5 MG/5ML
2.5 INJECTION INTRAVENOUS PRN
Status: DISCONTINUED | OUTPATIENT
Start: 2018-11-01 | End: 2018-11-01

## 2018-11-01 RX ORDER — NITROGLYCERIN 0.4 MG/1
0.4 TABLET SUBLINGUAL EVERY 5 MIN PRN
Status: DISCONTINUED | OUTPATIENT
Start: 2018-11-01 | End: 2018-11-01

## 2018-11-01 RX ORDER — SODIUM CHLORIDE 9 MG/ML
INJECTION, SOLUTION INTRAVENOUS ONCE
Status: COMPLETED | OUTPATIENT
Start: 2018-11-01 | End: 2018-11-01

## 2018-11-01 RX ADMIN — SODIUM CHLORIDE, PRESERVATIVE FREE 10 ML: 5 INJECTION INTRAVENOUS at 12:11

## 2018-11-01 RX ADMIN — SODIUM CHLORIDE: 9 INJECTION, SOLUTION INTRAVENOUS at 11:37

## 2018-11-01 RX ADMIN — SODIUM CHLORIDE, PRESERVATIVE FREE 10 ML: 5 INJECTION INTRAVENOUS at 13:20

## 2018-11-01 RX ADMIN — TETRAKIS(2-METHOXYISOBUTYLISOCYANIDE)COPPER(I) TETRAFLUOROBORATE 11 MILLICURIE: 1 INJECTION, POWDER, LYOPHILIZED, FOR SOLUTION INTRAVENOUS at 12:11

## 2018-11-01 RX ADMIN — TETRAKIS(2-METHOXYISOBUTYLISOCYANIDE)COPPER(I) TETRAFLUOROBORATE 40 MILLICURIE: 1 INJECTION, POWDER, LYOPHILIZED, FOR SOLUTION INTRAVENOUS at 13:20

## 2018-11-01 RX ADMIN — AMLODIPINE BESYLATE 10 MG: 10 TABLET ORAL at 15:23

## 2018-11-01 RX ADMIN — HYDROCHLOROTHIAZIDE 25 MG: 25 TABLET ORAL at 15:23

## 2018-11-01 RX ADMIN — NORTRIPTYLINE HYDROCHLORIDE 25 MG: 25 CAPSULE ORAL at 15:23

## 2018-11-01 RX ADMIN — Medication 10 ML: at 11:36

## 2018-11-01 ASSESSMENT — PAIN SCALES - GENERAL: PAINLEVEL_OUTOF10: 0

## 2018-11-01 NOTE — PLAN OF CARE
Problem: Infection:  Goal: Will remain free from infection  Will remain free from infection  Outcome: Ongoing      Problem: Safety:  Goal: Free from accidental physical injury  Free from accidental physical injury  Outcome: Ongoing    Goal: Free from intentional harm  Free from intentional harm  Outcome: Ongoing      Problem: Daily Care:  Goal: Daily care needs are met  Daily care needs are met  Outcome: Ongoing      Problem: Pain:  Goal: Patient's pain/discomfort is manageable  Patient's pain/discomfort is manageable  Outcome: Ongoing      Problem: Skin Integrity:  Goal: Skin integrity will stabilize  Skin integrity will stabilize  Outcome: Ongoing      Problem: Discharge Planning:  Goal: Patients continuum of care needs are met  Patients continuum of care needs are met  Outcome: Ongoing

## 2018-11-01 NOTE — H&P
3:05 pm COMPARISON: July 15, 2018, December 31, 2017 and February 25, 2016. HISTORY: ORDERING SYSTEM PROVIDED HISTORY: cp TECHNOLOGIST PROVIDED HISTORY: cp FINDINGS: The cardiomediastinal silhouette is unchanged in appearance. A calcified nodular shadow in the right lung apex is again demonstrated. There is no consolidation, pneumothorax, or evidence of edema. No effusion is appreciated. The osseous structures are unchanged in appearance. Unchanged appearance of the chest without acute airspace disease identified. LABS:  I have reviewed and interpreted all available lab results.   Labs Reviewed   CBC WITH AUTO DIFFERENTIAL - Abnormal; Notable for the following:        Result Value    Hemoglobin 11.8 (*)     Immature Granulocytes 1 (*)     All other components within normal limits   BASIC METABOLIC PANEL - Abnormal; Notable for the following:     BUN 24 (*)     CREATININE 1.25 (*)     Chloride 95 (*)     GFR Non- 41 (*)     GFR  49 (*)     All other components within normal limits   TROPONIN   POCT TROPONIN   POCT TROPONIN   POCT TROPONIN   POCT TROPONIN       SCREENING TOOLS:    HEART Risk Score for Chest Pain Patients   History and Physical Exam Suspicion Level  (Nausea, Vomiting, Diaphoresis, Radiation, Exertion)   Slightly Suspicious (0 pts)   Moderately Suspicious (1 pt)   Highly Suspicious (2 pts)   EKG Interpretation   Normal (0 pts)   Non-Specific Repolarization Disturbance (1 pt)   Significant ST-Depression (2 pts)   Age of Patient (in years)   = 39 (0 pts)   46-64 (1 pt)   = 65 (2 pts)   Risk Factors   No Risk Factors (0 pts)   1-2 Risk Factors (1 pt)   = 3 Risk Factors (2 pts)   Risk Factors Include:   Hypercholesterolemia   Hypertension   Diabetes Mellitus   Cigarette smoking   Positive family history   Obesity   CAD   (SLE, CKDz, HIV, Cocaine abuse)   Troponin Levels   = Normal Limit (0 pts)   1-3 Times Normal Limit (1 pt)   > 3 Times Normal Limit (2

## 2018-11-01 NOTE — CONSULTS
Auscultation: Good respiratory effort. No for increased work of breathing. On auscultation: clear to auscultation bilaterally  Cardiovascular:  · The apical impulse is not displaced  · Heart tones are crisp and normal. regular S1 and S2.  · Jugular venous pulsation Normal  · The carotid upstroke is normal in amplitude and contour without delay or bruit  · Peripheral pulses are symmetrical and full  Abdomen:  · No masses or tenderness  · Bowel sounds present  Extremities:  ·  No Cyanosis or Clubbing  ·  Lower extremity edema: No  · Skin: Warm and dry  Neurological:  · Alert and oriented. · Moves all extremities well  · No abnormalities of mood, affect, memory, mentation, or behavior are noted    DATA:    Diagnostics:    EKG: NSR, nonspecific ST changes. Labs:     CBC:   Recent Labs      10/31/18   1456   WBC  5.9   HGB  11.8*   HCT  37.6   PLT  322     BMP:   Recent Labs      10/31/18   1456   NA  136   K  4.1   CO2  29   BUN  24*   CREATININE  1.25*   LABGLOM  41*   GLUCOSE  92     BNP: No results for input(s): BNP in the last 72 hours. PT/INR: No results for input(s): PROTIME, INR in the last 72 hours. APTT:No results for input(s): APTT in the last 72 hours. CARDIAC ENZYMES:  Recent Labs      10/31/18   1429  10/31/18   1748   TROPONINI  0.00  0.00     FASTING LIPID PANEL:  Lab Results   Component Value Date    HDL 49 06/17/2013    TRIG 191 06/17/2013     LIVER PROFILE:No results for input(s): AST, ALT, LABALBU in the last 72 hours. IMPRESSION/RECOMMENDATIONS:  Chest pain, Plan for Treadmill Myoview stress test      Discussed with patient and Nurse.     Gilbert Conrad MD  [facility]           [phone]

## 2018-11-01 NOTE — DISCHARGE SUMMARY
Hemoglobin 11.8 (L) 11.9 - 15.1 g/dL    Hematocrit 37.6 36.3 - 47.1 %    MCV 94.0 82.6 - 102.9 fL    MCH 29.5 25.2 - 33.5 pg    MCHC 31.4 28.4 - 34.8 g/dL    RDW 12.8 11.8 - 14.4 %    Platelets 638 936 - 182 k/uL    MPV 9.7 8.1 - 13.5 fL    NRBC Automated 0.0 0.0 per 100 WBC    Differential Type NOT REPORTED     Seg Neutrophils 51 36 - 65 %    Lymphocytes 34 24 - 43 %    Monocytes 11 3 - 12 %    Eosinophils % 1 1 - 4 %    Basophils 2 0 - 2 %    Immature Granulocytes 1 (H) 0 %    Segs Absolute 3.14 1.50 - 8.10 k/uL    Absolute Lymph # 2.00 1. 10 - 3.70 k/uL    Absolute Mono # 0.64 0.10 - 1.20 k/uL    Absolute Eos # 0.03 0.00 - 0.44 k/uL    Basophils # 0.09 0.00 - 0.20 k/uL    Absolute Immature Granulocyte 0.04 0.00 - 0.30 k/uL    WBC Morphology NOT REPORTED     RBC Morphology NOT REPORTED     Platelet Estimate NOT REPORTED    Basic Metabolic Panel   Result Value Ref Range    Glucose 92 70 - 99 mg/dL    BUN 24 (H) 8 - 23 mg/dL    CREATININE 1.25 (H) 0.50 - 0.90 mg/dL    Bun/Cre Ratio NOT REPORTED 9 - 20    Calcium 9.4 8.6 - 10.4 mg/dL    Sodium 136 135 - 144 mmol/L    Potassium 4.1 3.7 - 5.3 mmol/L    Chloride 95 (L) 98 - 107 mmol/L    CO2 29 20 - 31 mmol/L    Anion Gap 12 9 - 17 mmol/L    GFR Non-African American 41 (L) >60 mL/min    GFR  49 (L) >60 mL/min    GFR Comment          GFR Staging NOT REPORTED    Troponin   Result Value Ref Range    Troponin T <0.03 <0.03 ng/mL    Troponin Interp         POCT troponin   Result Value Ref Range    POC Troponin I 0.00 0.00 - 0.10 ng/mL    POC Troponin Interp       The Troponin-I (POC) results cannot be compared to the Troponin-T results. POCT troponin   Result Value Ref Range    POC Troponin I 0.00 0.00 - 0.10 ng/mL    POC Troponin Interp       The Troponin-I (POC) results cannot be compared to the Troponin-T results.    EKG 12 Lead   Result Value Ref Range    Ventricular Rate 95 BPM    Atrial Rate 95 BPM    P-R Interval 146 ms    QRS Duration 96 ms    Q-T

## 2018-11-02 NOTE — FLOWSHEET NOTE
visited patient per rounding. Patient was being prepped for discharge. Patient overall in good spirits and family support was reported as okay.  offered encouragement, to which patient was receptive.     -  will remain available as needed for spiritual and emotional support.         11/01/18 1600   Encounter Summary   Services provided to: Patient   Referral/Consult From: [facility] Family members   Continue Visiting (11/1/18)   Complexity of Encounter Low   Spiritual Assessment Completed Yes   Routine   Type Initial   Assessment Approachable;Calm;Peaceful   Intervention Active listening;Explored feelings, thoughts, concerns;Nurtured hope   Outcome Expressed gratitude;Engaged in conversation

## 2019-01-29 ENCOUNTER — HOSPITAL ENCOUNTER (EMERGENCY)
Age: 84
Discharge: HOME | End: 2019-01-29
Attending: EMERGENCY MEDICINE
Payer: MEDICARE

## 2019-01-29 ENCOUNTER — APPOINTMENT (OUTPATIENT)
Dept: GENERAL RADIOLOGY | Age: 84
End: 2019-01-29
Payer: MEDICARE

## 2019-01-29 VITALS
OXYGEN SATURATION: 99 % | HEART RATE: 104 BPM | SYSTOLIC BLOOD PRESSURE: 153 MMHG | DIASTOLIC BLOOD PRESSURE: 86 MMHG | TEMPERATURE: 97.8 F | RESPIRATION RATE: 18 BRPM

## 2019-01-29 DIAGNOSIS — S49.91XA INJURY OF RIGHT SHOULDER, INITIAL ENCOUNTER: ICD-10-CM
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
## 2019-01-29 PROCEDURE — 73080 X-RAY EXAM OF ELBOW: CPT

## 2019-01-29 PROCEDURE — 73060 X-RAY EXAM OF HUMERUS: CPT

## 2019-01-29 PROCEDURE — 73030 X-RAY EXAM OF SHOULDER: CPT

## 2019-01-29 PROCEDURE — 99283 EMERGENCY DEPT VISIT LOW MDM: CPT

## 2019-01-29 PROCEDURE — 6370000000 HC RX 637 (ALT 250 FOR IP): Performed by: STUDENT IN AN ORGANIZED HEALTH CARE EDUCATION/TRAINING PROGRAM

## 2019-01-29 PROCEDURE — 73090 X-RAY EXAM OF FOREARM: CPT

## 2019-01-29 RX ORDER — OXYCODONE HYDROCHLORIDE AND ACETAMINOPHEN 5; 325 MG/1; MG/1
1 TABLET ORAL ONCE
Status: COMPLETED | OUTPATIENT
Start: 2019-01-29 | End: 2019-01-29

## 2019-01-29 RX ADMIN — OXYCODONE HYDROCHLORIDE AND ACETAMINOPHEN 1 TABLET: 5; 325 TABLET ORAL at 12:54

## 2019-01-29 ASSESSMENT — PAIN SCALES - GENERAL
PAINLEVEL_OUTOF10: 9
PAINLEVEL_OUTOF10: 9

## 2019-01-29 ASSESSMENT — ENCOUNTER SYMPTOMS
BACK PAIN: 0
SHORTNESS OF BREATH: 0
EYE ITCHING: 0
NAUSEA: 0
DIARRHEA: 0
VOMITING: 0
ABDOMINAL PAIN: 0
RHINORRHEA: 0
COUGH: 0
EYE REDNESS: 0

## 2019-01-29 ASSESSMENT — PAIN DESCRIPTION - LOCATION: LOCATION: ARM;SHOULDER

## 2019-01-29 ASSESSMENT — PAIN DESCRIPTION - ORIENTATION: ORIENTATION: RIGHT

## 2019-01-30 ENCOUNTER — OFFICE VISIT (OUTPATIENT)
Dept: ORTHOPEDIC SURGERY | Age: 84
End: 2019-01-30
Payer: MEDICARE

## 2019-01-30 VITALS — BODY MASS INDEX: 24.59 KG/M2 | HEIGHT: 64 IN | WEIGHT: 144 LBS

## 2019-01-30 DIAGNOSIS — S46.011A STRAIN OF RIGHT ROTATOR CUFF CAPSULE, INITIAL ENCOUNTER: Primary | ICD-10-CM

## 2019-01-30 PROCEDURE — G8484 FLU IMMUNIZE NO ADMIN: HCPCS | Performed by: STUDENT IN AN ORGANIZED HEALTH CARE EDUCATION/TRAINING PROGRAM

## 2019-01-30 PROCEDURE — G8400 PT W/DXA NO RESULTS DOC: HCPCS | Performed by: STUDENT IN AN ORGANIZED HEALTH CARE EDUCATION/TRAINING PROGRAM

## 2019-01-30 PROCEDURE — 4040F PNEUMOC VAC/ADMIN/RCVD: CPT | Performed by: STUDENT IN AN ORGANIZED HEALTH CARE EDUCATION/TRAINING PROGRAM

## 2019-01-30 PROCEDURE — 1101F PT FALLS ASSESS-DOCD LE1/YR: CPT | Performed by: STUDENT IN AN ORGANIZED HEALTH CARE EDUCATION/TRAINING PROGRAM

## 2019-01-30 PROCEDURE — G8428 CUR MEDS NOT DOCUMENT: HCPCS | Performed by: STUDENT IN AN ORGANIZED HEALTH CARE EDUCATION/TRAINING PROGRAM

## 2019-01-30 PROCEDURE — 1090F PRES/ABSN URINE INCON ASSESS: CPT | Performed by: STUDENT IN AN ORGANIZED HEALTH CARE EDUCATION/TRAINING PROGRAM

## 2019-01-30 PROCEDURE — 1123F ACP DISCUSS/DSCN MKR DOCD: CPT | Performed by: STUDENT IN AN ORGANIZED HEALTH CARE EDUCATION/TRAINING PROGRAM

## 2019-01-30 PROCEDURE — 99213 OFFICE O/P EST LOW 20 MIN: CPT | Performed by: STUDENT IN AN ORGANIZED HEALTH CARE EDUCATION/TRAINING PROGRAM

## 2019-01-30 PROCEDURE — G8419 CALC BMI OUT NRM PARAM NOF/U: HCPCS | Performed by: STUDENT IN AN ORGANIZED HEALTH CARE EDUCATION/TRAINING PROGRAM

## 2019-01-30 PROCEDURE — 1036F TOBACCO NON-USER: CPT | Performed by: STUDENT IN AN ORGANIZED HEALTH CARE EDUCATION/TRAINING PROGRAM

## 2019-06-10 ENCOUNTER — OFFICE VISIT (OUTPATIENT)
Dept: FAMILY MEDICINE CLINIC | Age: 84
End: 2019-06-10
Payer: MEDICARE

## 2019-06-10 VITALS
OXYGEN SATURATION: 98 % | SYSTOLIC BLOOD PRESSURE: 136 MMHG | HEART RATE: 103 BPM | DIASTOLIC BLOOD PRESSURE: 72 MMHG | TEMPERATURE: 100 F | BODY MASS INDEX: 26.58 KG/M2 | WEIGHT: 150 LBS | RESPIRATION RATE: 18 BRPM | HEIGHT: 63 IN

## 2019-06-10 DIAGNOSIS — J01.90 ACUTE SINUSITIS, RECURRENCE NOT SPECIFIED, UNSPECIFIED LOCATION: Primary | ICD-10-CM

## 2019-06-10 DIAGNOSIS — R05.9 COUGH: ICD-10-CM

## 2019-06-10 PROCEDURE — 1090F PRES/ABSN URINE INCON ASSESS: CPT | Performed by: NURSE PRACTITIONER

## 2019-06-10 PROCEDURE — G8400 PT W/DXA NO RESULTS DOC: HCPCS | Performed by: NURSE PRACTITIONER

## 2019-06-10 PROCEDURE — G8427 DOCREV CUR MEDS BY ELIG CLIN: HCPCS | Performed by: NURSE PRACTITIONER

## 2019-06-10 PROCEDURE — 4040F PNEUMOC VAC/ADMIN/RCVD: CPT | Performed by: NURSE PRACTITIONER

## 2019-06-10 PROCEDURE — 99202 OFFICE O/P NEW SF 15 MIN: CPT | Performed by: NURSE PRACTITIONER

## 2019-06-10 PROCEDURE — 1036F TOBACCO NON-USER: CPT | Performed by: NURSE PRACTITIONER

## 2019-06-10 PROCEDURE — G8419 CALC BMI OUT NRM PARAM NOF/U: HCPCS | Performed by: NURSE PRACTITIONER

## 2019-06-10 PROCEDURE — 1123F ACP DISCUSS/DSCN MKR DOCD: CPT | Performed by: NURSE PRACTITIONER

## 2019-06-10 RX ORDER — AZITHROMYCIN 250 MG/1
TABLET, FILM COATED ORAL
Qty: 1 PACKET | Refills: 0 | Status: SHIPPED | OUTPATIENT
Start: 2019-06-10 | End: 2019-06-15

## 2019-06-10 RX ORDER — BENZONATATE 100 MG/1
100 CAPSULE ORAL 3 TIMES DAILY PRN
Qty: 20 CAPSULE | Refills: 0 | Status: SHIPPED | OUTPATIENT
Start: 2019-06-10 | End: 2019-06-15

## 2019-06-10 ASSESSMENT — ENCOUNTER SYMPTOMS
VOMITING: 0
PHOTOPHOBIA: 0
COUGH: 1
RHINORRHEA: 1
ABDOMINAL PAIN: 0
SHORTNESS OF BREATH: 0
SINUS PRESSURE: 1

## 2019-06-10 NOTE — PATIENT INSTRUCTIONS
and 1 teaspoon of baking soda to 2 cups of distilled water. If you make your own, fill a bulb syringe with the solution, insert the tip into your nostril, and squeeze gently. Linda Running your nose. · Put a hot, wet towel or a warm gel pack on your face 3 or 4 times a day for 5 to 10 minutes each time. · Try a decongestant nasal spray like oxymetazoline (Afrin). Do not use it for more than 3 days in a row. Using it for more than 3 days can make your congestion worse. When should you call for help? Call your doctor now or seek immediate medical care if:    · You have new or worse swelling or redness in your face or around your eyes.     · You have a new or higher fever.    Watch closely for changes in your health, and be sure to contact your doctor if:    · You have new or worse facial pain.     · The mucus from your nose becomes thicker (like pus) or has new blood in it.     · You are not getting better as expected. Where can you learn more? Go to https://Happy Industry.Clicker. org and sign in to your LectureTools account. Enter R013 in the BrickTrends box to learn more about \"Sinusitis: Care Instructions. \"     If you do not have an account, please click on the \"Sign Up Now\" link. Current as of: October 21, 2018  Content Version: 12.0  © 5652-7053 [employer]. Care instructions adapted under license by [facility] ([facility]). If you have questions about a medical condition or this instruction, always ask your healthcare professional. [place] [address] any warranty or liability for your use of this information. Patient Education        Cough: Care Instructions  Your Care Instructions    A cough is your body's response to something that bothers your throat or airways. Many things can cause a cough. You might cough because of a cold or the flu, bronchitis, or asthma. Smoking, postnasal drip, allergies, and stomach acid that backs up into your throat also can cause coughs.   A cough is a symptom, not a disease. Most coughs stop when the cause, such as a cold, goes away. You can take a few steps at home to cough less and feel better. Follow-up care is a key part of your treatment and safety. Be sure to make and go to all appointments, and call your doctor if you are having problems. It's also a good idea to know your test results and keep a list of the medicines you take. How can you care for yourself at home? · Drink lots of water and other fluids. This helps thin the mucus and soothes a dry or sore throat. Honey or lemon juice in hot water or tea may ease a dry cough. · Take cough medicine as directed by your doctor. · Prop up your head on pillows to help you breathe and ease a dry cough. · Try cough drops to soothe a dry or sore throat. Cough drops don't stop a cough. Medicine-flavored cough drops are no better than candy-flavored drops or hard candy. · Do not smoke. Avoid secondhand smoke. If you need help quitting, talk to your doctor about stop-smoking programs and medicines. These can increase your chances of quitting for good. When should you call for help? Call 911 anytime you think you may need emergency care. For example, call if:    · You have severe trouble breathing.    Call your doctor now or seek immediate medical care if:    · You cough up blood.     · You have new or worse trouble breathing.     · You have a new or higher fever.     · You have a new rash.    Watch closely for changes in your health, and be sure to contact your doctor if:    · You cough more deeply or more often, especially if you notice more mucus or a change in the color of your mucus.     · You have new symptoms, such as a sore throat, an earache, or sinus pain.     · You do not get better as expected. Where can you learn more? Go to https://AdGent DigitaljoseCliptone.Widemile. org and sign in to your [employer] account.  Enter D279 in the ZON Networks box to learn more about \"Cough: Care

## 2019-06-10 NOTE — PROGRESS NOTES
[address]  [address] [facility] [address]  Dept: [phone]  Dept Fax: [phone]    Esteban Al a 80 y.o. female who presents to the urgent care today for her medical conditions/complaintsas noted below. Larry Bermudez is c/o of Cough (started yesterday non productive cough   no other symptoms )      HPI:     Patient states has cough since yesterday  But was also put on amox at end of may for sinusitis from pcp, but developed itching and was told to stop amox at day 4 or 5  Still feels sinus like  grandkisonia has cough  Denies cp, sob, wheezing, n/v/d/rash    Cough   This is a new problem. The current episode started yesterday. The problem has been waxing and waning. The cough is non-productive. Associated symptoms include chills, nasal congestion, postnasal drip and rhinorrhea. Pertinent negatives include no chest pain, fever, myalgias, rash or shortness of breath. The symptoms are aggravated by lying down. Treatments tried: amox, not complete tho.        Past Medical History:   Diagnosis Date    Anemia     Anxiety     anxiety, depression    Depression     Fibromyalgia     Gastritis     GERD (gastroesophageal reflux disease)     Hyperlipidemia     Hypertension     Lumbar disc disease     LOW BACK PAIN INTERMITTENT/STATES LARGE BULGE    Neuropathy     left leg numbness    Numbness     LEFT LEG/KNEE TO ANKLE/WEAK LEG-USES WALKER     Osteoarthritis     Watermelon stomach 02/2016    Wears glasses        Current Outpatient Medications   Medication Sig Dispense Refill    azithromycin (ZITHROMAX) 250 MG tablet Take 2 tabs (500 mg) on Day 1, and take 1 tab (250 mg) on days 2 through 5. 1 packet 0    benzonatate (TESSALON PERLES) 100 MG capsule Take 1 capsule by mouth 3 times daily as needed for Cough 20 capsule 0    nortriptyline (PAMELOR) 25 MG capsule Take 25 mg by mouth daily      ALPRAZolam (XANAX) 0.25 MG tablet Take 0.25 mg by mouth nightly as needed for Sleep.  amLODIPine (NORVASC) 10 MG tablet Take 10 mg by mouth daily      hydrochlorothiazide (HYDRODIURIL) 25 MG tablet Take 1 tablet by mouth daily. 30 tablet 0     No current facility-administered medications for this visit. Allergies   Allergen Reactions    Amoxicillin        Subjective:     Review of Systems   Constitutional: Positive for chills. Negative for fever. HENT: Positive for postnasal drip, rhinorrhea and sinus pressure. Eyes: Negative for photophobia. Respiratory: Positive for cough. Negative for shortness of breath. Cardiovascular: Negative for chest pain. Gastrointestinal: Negative for abdominal pain and vomiting. Musculoskeletal: Negative for myalgias. Skin: Negative for rash. Neurological: Negative for dizziness. All other systems reviewed and are negative. Objective:      Physical Exam   Constitutional: She is oriented to person, place, and time. She appears well-developed and well-nourished. No distress. HENT:   Head: Normocephalic and atraumatic. Right Ear: Tympanic membrane normal.   Left Ear: Tympanic membrane normal.   Nose: Mucosal edema present. Right sinus exhibits maxillary sinus tenderness. Left sinus exhibits maxillary sinus tenderness. Mouth/Throat: Uvula is midline and mucous membranes are normal. Posterior oropharyngeal erythema present. No oropharyngeal exudate or posterior oropharyngeal edema. Eyes: Pupils are equal, round, and reactive to light. Right eye exhibits no discharge. Left eye exhibits no discharge. No scleral icterus. Neck: Normal range of motion. Neck supple. Cardiovascular: Normal rate, regular rhythm and normal heart sounds. No murmur heard. Pulmonary/Chest: Effort normal and breath sounds normal. No stridor. No respiratory distress. She has no wheezes. She has no rales. Harsh cough noted   Abdominal: Soft. Bowel sounds are normal. There is no tenderness. Musculoskeletal: Normal range of motion. She exhibits no edema. Neg homans. No lower leg edema noted   Lymphadenopathy:     She has no cervical adenopathy. Neurological: She is alert and oriented to person, place, and time. No cranial nerve deficit. Skin: Skin is warm and dry. Capillary refill takes less than 2 seconds. No rash noted. She is not diaphoretic. Psychiatric: She has a normal mood and affect. Her behavior is normal.   Nursing note and vitals reviewed. /72 (Site: Left Upper Arm, Position: Sitting, Cuff Size: Medium Adult)   Pulse 103   Temp 100 °F (37.8 °C) (Tympanic)   Resp 18   Ht 5' 3\" (1.6 m)   Wt 150 lb (68 kg)   SpO2 98%   BMI 26.57 kg/m²     Assessment:          Diagnosis Orders   1. Acute sinusitis, recurrence not specified, unspecified location  azithromycin (ZITHROMAX) 250 MG tablet   2. Cough  benzonatate (TESSALON PERLES) 100 MG capsule       Plan:    Push fluids  Recommend zyrtec  Gargle warm salt water  Tylenol as directed  Recheck for chest pain, short of breath, wheezing, feeling worse, any change or concern  Follow up with primary care by Friday    Return for follow up with primary care in 7 days, worsening, change or concern. Orders Placed This Encounter   Medications    azithromycin (ZITHROMAX) 250 MG tablet     Sig: Take 2 tabs (500 mg) on Day 1, and take 1 tab (250 mg) on days 2 through 5. Dispense:  1 packet     Refill:  0    benzonatate (TESSALON PERLES) 100 MG capsule     Sig: Take 1 capsule by mouth 3 times daily as needed for Cough     Dispense:  20 capsule     Refill:  0         Patient given educational materials - see patientinstructions. Discussed use, benefit, and side effects of prescribed medications. All patient questions answered. Pt voiced understanding.     Electronically signed by GUERRERO Heath 6/10/2019 at 3:22 PM

## 2019-06-15 ENCOUNTER — APPOINTMENT (OUTPATIENT)
Dept: GENERAL RADIOLOGY | Age: 84
End: 2019-06-15
Payer: MEDICARE

## 2019-06-15 ENCOUNTER — HOSPITAL ENCOUNTER (EMERGENCY)
Age: 84
Discharge: HOME | End: 2019-06-15
Attending: EMERGENCY MEDICINE
Payer: MEDICARE

## 2019-06-15 VITALS
DIASTOLIC BLOOD PRESSURE: 60 MMHG | TEMPERATURE: 97.8 F | HEIGHT: 63 IN | BODY MASS INDEX: 26.75 KG/M2 | RESPIRATION RATE: 18 BRPM | WEIGHT: 151 LBS | SYSTOLIC BLOOD PRESSURE: 112 MMHG | OXYGEN SATURATION: 97 % | HEART RATE: 86 BPM

## 2019-06-15 DIAGNOSIS — R05.9 COUGH: Primary | ICD-10-CM

## 2019-06-15 DIAGNOSIS — E87.6 HYPOKALEMIA: ICD-10-CM

## 2019-06-15 LAB
-: NORMAL
ABSOLUTE EOS #: 0.04 K/UL (ref 0–0.44)
ABSOLUTE IMMATURE GRANULOCYTE: 0.11 K/UL (ref 0–0.3)
ABSOLUTE LYMPH #: 1.1 K/UL (ref 1.1–3.7)
ABSOLUTE MONO #: 0.84 K/UL (ref 0.1–1.2)
AMORPHOUS: NORMAL
ANION GAP SERPL CALCULATED.3IONS-SCNC: 14 MMOL/L (ref 9–17)
BACTERIA: NORMAL
BASOPHILS # BLD: 1 % (ref 0–2)
BASOPHILS ABSOLUTE: 0.07 K/UL (ref 0–0.2)
BILIRUBIN URINE: NEGATIVE
BNP INTERPRETATION: NORMAL
BUN BLDV-MCNC: 28 MG/DL (ref 8–23)
BUN/CREAT BLD: 19 (ref 9–20)
CALCIUM SERPL-MCNC: 9.1 MG/DL (ref 8.6–10.4)
CASTS UA: NORMAL /LPF
CHLORIDE BLD-SCNC: 92 MMOL/L (ref 98–107)
CO2: 27 MMOL/L (ref 20–31)
COLOR: YELLOW
COMMENT UA: ABNORMAL
CREAT SERPL-MCNC: 1.45 MG/DL (ref 0.5–0.9)
CRYSTALS, UA: NORMAL /HPF
D-DIMER QUANTITATIVE: 0.7 MG/L FEU
DIFFERENTIAL TYPE: ABNORMAL
EOSINOPHILS RELATIVE PERCENT: 0 % (ref 1–4)
EPITHELIAL CELLS UA: NORMAL /HPF (ref 0–5)
GFR AFRICAN AMERICAN: 42 ML/MIN
GFR NON-AFRICAN AMERICAN: 34 ML/MIN
GFR SERPL CREATININE-BSD FRML MDRD: ABNORMAL ML/MIN/{1.73_M2}
GFR SERPL CREATININE-BSD FRML MDRD: ABNORMAL ML/MIN/{1.73_M2}
GLUCOSE BLD-MCNC: 128 MG/DL (ref 70–99)
GLUCOSE URINE: NEGATIVE
HCT VFR BLD CALC: 31.6 % (ref 36.3–47.1)
HEMOGLOBIN: 10.2 G/DL (ref 11.9–15.1)
IMMATURE GRANULOCYTES: 1 %
KETONES, URINE: NEGATIVE
LEUKOCYTE ESTERASE, URINE: ABNORMAL
LYMPHOCYTES # BLD: 12 % (ref 24–43)
MCH RBC QN AUTO: 29.3 PG (ref 25.2–33.5)
MCHC RBC AUTO-ENTMCNC: 32.3 G/DL (ref 28.4–34.8)
MCV RBC AUTO: 90.8 FL (ref 82.6–102.9)
MONOCYTES # BLD: 9 % (ref 3–12)
MUCUS: NORMAL
NITRITE, URINE: NEGATIVE
NRBC AUTOMATED: 0 PER 100 WBC
OTHER OBSERVATIONS UA: NORMAL
PDW BLD-RTO: 12.8 % (ref 11.8–14.4)
PH UA: 6 (ref 5–8)
PLATELET # BLD: 259 K/UL (ref 138–453)
PLATELET ESTIMATE: ABNORMAL
PMV BLD AUTO: 9.7 FL (ref 8.1–13.5)
POTASSIUM SERPL-SCNC: 3.5 MMOL/L (ref 3.7–5.3)
PRO-BNP: 268 PG/ML
PROTEIN UA: ABNORMAL
RBC # BLD: 3.48 M/UL (ref 3.95–5.11)
RBC # BLD: ABNORMAL 10*6/UL
RBC UA: NORMAL /HPF (ref 0–2)
RENAL EPITHELIAL, UA: NORMAL /HPF
SEG NEUTROPHILS: 77 % (ref 36–65)
SEGMENTED NEUTROPHILS ABSOLUTE COUNT: 6.92 K/UL (ref 1.5–8.1)
SODIUM BLD-SCNC: 133 MMOL/L (ref 135–144)
SPECIFIC GRAVITY UA: 1.02 (ref 1–1.03)
TRICHOMONAS: NORMAL
TURBIDITY: ABNORMAL
URINE HGB: ABNORMAL
UROBILINOGEN, URINE: NORMAL
WBC # BLD: 9.1 K/UL (ref 3.5–11.3)
WBC # BLD: ABNORMAL 10*3/UL
WBC UA: NORMAL /HPF (ref 0–5)
YEAST: NORMAL

## 2019-06-15 PROCEDURE — 81001 URINALYSIS AUTO W/SCOPE: CPT

## 2019-06-15 PROCEDURE — 99283 EMERGENCY DEPT VISIT LOW MDM: CPT

## 2019-06-15 PROCEDURE — 83880 ASSAY OF NATRIURETIC PEPTIDE: CPT

## 2019-06-15 PROCEDURE — 71046 X-RAY EXAM CHEST 2 VIEWS: CPT

## 2019-06-15 PROCEDURE — 6370000000 HC RX 637 (ALT 250 FOR IP): Performed by: EMERGENCY MEDICINE

## 2019-06-15 PROCEDURE — 85379 FIBRIN DEGRADATION QUANT: CPT

## 2019-06-15 PROCEDURE — 80048 BASIC METABOLIC PNL TOTAL CA: CPT

## 2019-06-15 PROCEDURE — 36415 COLL VENOUS BLD VENIPUNCTURE: CPT

## 2019-06-15 PROCEDURE — 85025 COMPLETE CBC W/AUTO DIFF WBC: CPT

## 2019-06-15 RX ORDER — POTASSIUM CHLORIDE 750 MG/1
10 TABLET, EXTENDED RELEASE ORAL DAILY
Qty: 15 TABLET | Refills: 0 | Status: SHIPPED | OUTPATIENT
Start: 2019-06-15 | End: 2019-06-30

## 2019-06-15 RX ORDER — DEXTROMETHORPHAN HYDROBROMIDE AND PROMETHAZINE HYDROCHLORIDE 15; 6.25 MG/5ML; MG/5ML
5 SYRUP ORAL 3 TIMES DAILY PRN
Qty: 118 ML | Refills: 0 | Status: SHIPPED | OUTPATIENT
Start: 2019-06-15 | End: 2019-06-22

## 2019-06-15 RX ORDER — POTASSIUM CHLORIDE 750 MG/1
10 CAPSULE, EXTENDED RELEASE ORAL DAILY
Status: DISCONTINUED | OUTPATIENT
Start: 2019-06-15 | End: 2019-06-15 | Stop reason: HOSPADM

## 2019-06-15 RX ADMIN — POTASSIUM CHLORIDE 10 MEQ: 750 CAPSULE, EXTENDED RELEASE ORAL at 15:24

## 2019-06-15 ASSESSMENT — PAIN SCALES - GENERAL: PAINLEVEL_OUTOF10: 3

## 2019-06-15 ASSESSMENT — PAIN DESCRIPTION - FREQUENCY: FREQUENCY: CONTINUOUS

## 2019-06-15 ASSESSMENT — PAIN DESCRIPTION - LOCATION: LOCATION: BACK;GENERALIZED

## 2019-06-15 ASSESSMENT — PAIN DESCRIPTION - DESCRIPTORS: DESCRIPTORS: ACHING;SHARP

## 2019-06-15 NOTE — ED PROVIDER NOTES
Chief complaint  Cough chest discomfort during coughing for about a week duration  Flulike symptoms about a week ago treated with Z-Tavon for a week with no relief  Denies any shortness of breath discomfort with the cough  Complaining of generalized weakness    Medical history anemia anxiety depression hypertension  Systemic review    Constitutional: Mild generalized weakness  Eyes: Is normal  HENT: Denies any nasal discharge  Respiratory: Denies any shortness of breath c/o pain on cough  Cardiovascular: no  chest pain  GI: Denies any nausea vomiting  :  no discomfort  Musculoskeletal: no back pain  Integument: none  Lymphatic: neg  Neurologic: none  Psychiatric: none  PHYSICAL EXAM    Constitutional: Hydrated well-nourished  Eyes: normal  HENT:normal  Respiratory: No rales no rhonchi no wheezing frequent cough  : Normal paranasal sinuses, no maxillary sinus tenderness, no frontal sinus tenderness. Cardiovascular   no murmur, regular rhthym    : normal  Musculoskeletal:  no weakness, Lymphatic: No odema lymphadenopathy  Neurologic: Motor weakness cranial nerves grossly intact no  Psychiatric: ok    Alert oriented          Xr Chest Standard (2 Vw)    Result Date: 6/15/2019  EXAMINATION: TWO XRAY VIEWS OF THE CHEST 6/15/2019 1:45 pm COMPARISON: 10/31/2018 HISTORY: ORDERING SYSTEM PROVIDED HISTORY: Chest Pain TECHNOLOGIST PROVIDED HISTORY: Chest Pain Acuity: Acute Type of Exam: Unknown 51-year-old female with chest pain FINDINGS: Trachea is midline. Cardiac and mediastinal contours are within normal limits. No pneumothorax is seen. No free air is seen below the diaphragm. Mild biapical pleural thickening. No acute focal airspace consolidation or pleural effusions are identified. Mild diffuse degenerative changes throughout the spine. No acute focal airspace consolidation.      diag   coughmild hypokalemia        Treatment  Micro k 10 mq po   pcp   Follow up                     Roberto Swenson MD  06/15/19 Michelle 123, MD  06/19/19 3097

## 2019-06-15 NOTE — PROGRESS NOTES
Transitions of Care Pharmacy Service   Medication Review    The patient's list of current home medications has been reviewed and updated. Source(s) of information: Patient/OARRS/Sure Scripts    Unable to confirm doses with pharmacy due to it being closed. Patient seemed to know the doses of her medications. Please feel free to call with any questions about this encounter. Thank you. Michael Gustafson, [facility]  Transitions of Care Pharmacy Service  Phone:  [phone]  Fax: [phone]      Prior to Admission medications    Medication Sig Start Date End Date Taking? Authorizing Provider   nortriptyline (PAMELOR) 25 MG capsule Take 25 mg by mouth daily   Yes Historical Provider, MD   ALPRAZolam (XANAX) 0.5 MG tablet Take 0.25 mg by mouth nightly as needed for Sleep. Take 1/2 of 0.5mg tablet   Yes Historical Provider, MD   amLODIPine (NORVASC) 10 MG tablet Take 10 mg by mouth daily   Yes Historical Provider, MD   hydrochlorothiazide (HYDRODIURIL) 25 MG tablet Take 1 tablet by mouth daily.  3/6/14  Yes Jamila Stallworth, DO

## 2019-11-05 ENCOUNTER — OFFICE VISIT (OUTPATIENT)
Dept: FAMILY MEDICINE CLINIC | Age: 84
End: 2019-11-05
Payer: MEDICARE

## 2019-11-05 VITALS
SYSTOLIC BLOOD PRESSURE: 130 MMHG | OXYGEN SATURATION: 99 % | TEMPERATURE: 98.5 F | HEART RATE: 85 BPM | RESPIRATION RATE: 18 BRPM | DIASTOLIC BLOOD PRESSURE: 62 MMHG

## 2019-11-05 DIAGNOSIS — J30.9 ALLERGIC RHINITIS, UNSPECIFIED SEASONALITY, UNSPECIFIED TRIGGER: Primary | ICD-10-CM

## 2019-11-05 PROCEDURE — 4040F PNEUMOC VAC/ADMIN/RCVD: CPT | Performed by: NURSE PRACTITIONER

## 2019-11-05 PROCEDURE — G8400 PT W/DXA NO RESULTS DOC: HCPCS | Performed by: NURSE PRACTITIONER

## 2019-11-05 PROCEDURE — 1036F TOBACCO NON-USER: CPT | Performed by: NURSE PRACTITIONER

## 2019-11-05 PROCEDURE — G8427 DOCREV CUR MEDS BY ELIG CLIN: HCPCS | Performed by: NURSE PRACTITIONER

## 2019-11-05 PROCEDURE — 1090F PRES/ABSN URINE INCON ASSESS: CPT | Performed by: NURSE PRACTITIONER

## 2019-11-05 PROCEDURE — 1123F ACP DISCUSS/DSCN MKR DOCD: CPT | Performed by: NURSE PRACTITIONER

## 2019-11-05 PROCEDURE — 99202 OFFICE O/P NEW SF 15 MIN: CPT | Performed by: NURSE PRACTITIONER

## 2019-11-05 PROCEDURE — G8417 CALC BMI ABV UP PARAM F/U: HCPCS | Performed by: NURSE PRACTITIONER

## 2019-11-05 PROCEDURE — G8484 FLU IMMUNIZE NO ADMIN: HCPCS | Performed by: NURSE PRACTITIONER

## 2019-11-05 RX ORDER — FLUTICASONE PROPIONATE 50 MCG
2 SPRAY, SUSPENSION (ML) NASAL DAILY
Qty: 1 BOTTLE | Refills: 0 | Status: SHIPPED | OUTPATIENT
Start: 2019-11-05

## 2019-11-05 RX ORDER — LORATADINE 10 MG/1
10 TABLET ORAL DAILY
Qty: 14 TABLET | Refills: 0 | Status: SHIPPED | OUTPATIENT
Start: 2019-11-05

## 2019-11-05 ASSESSMENT — ENCOUNTER SYMPTOMS
SINUS PRESSURE: 1
SINUS PAIN: 0
RHINORRHEA: 1
COUGH: 0
SHORTNESS OF BREATH: 0
SORE THROAT: 0

## 2020-01-06 ENCOUNTER — HOSPITAL ENCOUNTER (EMERGENCY)
Age: 85
Discharge: HOME | End: 2020-01-06
Attending: EMERGENCY MEDICINE
Payer: MEDICARE

## 2020-01-06 ENCOUNTER — APPOINTMENT (OUTPATIENT)
Dept: CT IMAGING | Age: 85
End: 2020-01-06
Payer: MEDICARE

## 2020-01-06 VITALS
HEART RATE: 88 BPM | OXYGEN SATURATION: 98 % | TEMPERATURE: 98.6 F | DIASTOLIC BLOOD PRESSURE: 74 MMHG | RESPIRATION RATE: 16 BRPM | SYSTOLIC BLOOD PRESSURE: 166 MMHG

## 2020-01-06 LAB
ABSOLUTE EOS #: 0.04 K/UL (ref 0–0.44)
ABSOLUTE IMMATURE GRANULOCYTE: 0.05 K/UL (ref 0–0.3)
ABSOLUTE LYMPH #: 1.6 K/UL (ref 1.1–3.7)
ABSOLUTE MONO #: 0.52 K/UL (ref 0.1–1.2)
ANION GAP SERPL CALCULATED.3IONS-SCNC: 13 MMOL/L (ref 9–17)
BASOPHILS # BLD: 1 % (ref 0–2)
BASOPHILS ABSOLUTE: 0.08 K/UL (ref 0–0.2)
BUN BLDV-MCNC: 19 MG/DL (ref 8–23)
BUN/CREAT BLD: 15 (ref 9–20)
CALCIUM SERPL-MCNC: 10 MG/DL (ref 8.6–10.4)
CHLORIDE BLD-SCNC: 96 MMOL/L (ref 98–107)
CO2: 28 MMOL/L (ref 20–31)
CREAT SERPL-MCNC: 1.29 MG/DL (ref 0.5–0.9)
DIFFERENTIAL TYPE: ABNORMAL
EOSINOPHILS RELATIVE PERCENT: 1 % (ref 1–4)
GFR AFRICAN AMERICAN: 48 ML/MIN
GFR NON-AFRICAN AMERICAN: 39 ML/MIN
GFR SERPL CREATININE-BSD FRML MDRD: ABNORMAL ML/MIN/{1.73_M2}
GFR SERPL CREATININE-BSD FRML MDRD: ABNORMAL ML/MIN/{1.73_M2}
GLUCOSE BLD-MCNC: 130 MG/DL (ref 70–99)
HCT VFR BLD CALC: 38.4 % (ref 36.3–47.1)
HEMOGLOBIN: 12 G/DL (ref 11.9–15.1)
IMMATURE GRANULOCYTES: 1 %
LYMPHOCYTES # BLD: 23 % (ref 24–43)
MCH RBC QN AUTO: 29.6 PG (ref 25.2–33.5)
MCHC RBC AUTO-ENTMCNC: 31.3 G/DL (ref 28.4–34.8)
MCV RBC AUTO: 94.6 FL (ref 82.6–102.9)
MONOCYTES # BLD: 7 % (ref 3–12)
NRBC AUTOMATED: 0 PER 100 WBC
PDW BLD-RTO: 13.7 % (ref 11.8–14.4)
PLATELET # BLD: 275 K/UL (ref 138–453)
PLATELET ESTIMATE: ABNORMAL
PMV BLD AUTO: 9.4 FL (ref 8.1–13.5)
POTASSIUM SERPL-SCNC: 3.7 MMOL/L (ref 3.7–5.3)
RBC # BLD: 4.06 M/UL (ref 3.95–5.11)
RBC # BLD: ABNORMAL 10*6/UL
SEG NEUTROPHILS: 67 % (ref 36–65)
SEGMENTED NEUTROPHILS ABSOLUTE COUNT: 4.77 K/UL (ref 1.5–8.1)
SODIUM BLD-SCNC: 137 MMOL/L (ref 135–144)
WBC # BLD: 7.1 K/UL (ref 3.5–11.3)
WBC # BLD: ABNORMAL 10*3/UL

## 2020-01-06 PROCEDURE — 85025 COMPLETE CBC W/AUTO DIFF WBC: CPT

## 2020-01-06 PROCEDURE — 80048 BASIC METABOLIC PNL TOTAL CA: CPT

## 2020-01-06 PROCEDURE — 70450 CT HEAD/BRAIN W/O DYE: CPT

## 2020-01-06 PROCEDURE — 99284 EMERGENCY DEPT VISIT MOD MDM: CPT

## 2020-01-06 ASSESSMENT — ENCOUNTER SYMPTOMS
EYE DISCHARGE: 0
COUGH: 0
EYE REDNESS: 0
SINUS PRESSURE: 1
VOMITING: 0
SHORTNESS OF BREATH: 0
ABDOMINAL PAIN: 0
COLOR CHANGE: 0
DIARRHEA: 0
CONSTIPATION: 0
FACIAL SWELLING: 0
SINUS PAIN: 1

## 2020-01-07 NOTE — ED PROVIDER NOTES
LEFT LEG/KNEE TO ANKLE/WEAK LEG-USES WALKER     Osteoarthritis     Watermelon stomach 2016    Wears glasses        SURGICAL HISTORY           Procedure Laterality Date    BACK SURGERY  13    micro [id number]    CATARACT REMOVAL      wesly cataract    CHOLECYSTECTOMY      HEMORRHOID SURGERY      NASAL ENDOSCOPY      [place] ESOPHAGOGASTRODUODENOSCOPY TRANSORAL DIAGNOSTIC N/A 2017    EGD ESOPHAGOGASTRODUODENOSCOPY WITH APC performed by Owen Way MD at [facility]    UPPER GASTROINTESTINAL ENDOSCOPY           FAMILY HISTORY           Problem Relation Age of Onset    Kidney Disease Sister     Stroke Other     Diabetes Other      Family Status   Relation Name Status    Sister      Other SISTER         SOCIAL HISTORY      reports that she has quit smoking. She has never used smokeless tobacco. She reports current alcohol use. She reports that she does not use drugs. REVIEW OF SYSTEMS    (2-9 systems for level 4, 10 or more for level 5)     Review of Systems   Constitutional: Negative for chills, fatigue and fever. HENT: Positive for sinus pressure and sinus pain. Negative for congestion, ear discharge and facial swelling. Eyes: Negative for discharge and redness. Respiratory: Negative for cough and shortness of breath. Cardiovascular: Negative for chest pain. Gastrointestinal: Negative for abdominal pain, constipation, diarrhea and vomiting. Genitourinary: Negative for dysuria and hematuria. Musculoskeletal: Negative for arthralgias. Skin: Negative for color change and rash. Neurological: Positive for headaches. Negative for dizziness, syncope and numbness. Hematological: Negative for adenopathy. Psychiatric/Behavioral: Negative for confusion. The patient is not nervous/anxious. Except as noted above the remainder of the review of systems was reviewed and negative.      PHYSICAL EXAM    (up to 7 for level 4, 8 or more for level 5) iterative reconstruction, and/or weight based adjustment of the mA/kV was utilized to reduce the radiation dose to as low as reasonably achievable. COMPARISON: CTA head and neck on July 15, 2018. CT head on July 15, 2018. HISTORY: ORDERING SYSTEM PROVIDED HISTORY: Atraumatic headache, history of hypertension TECHNOLOGIST PROVIDED HISTORY: Atraumatic headache, history of hypertension FINDINGS: BRAIN/VENTRICLES: There is no acute intracranial hemorrhage, mass effect or midline shift. No abnormal extra-axial fluid collection. The gray-white differentiation is maintained without evidence of an acute infarct. There is no evidence of hydrocephalus. 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 ORBITS: The visualized portion of the orbits demonstrate no acute abnormality. SINUSES: The visualized paranasal sinuses and mastoid air cells demonstrate no acute abnormality. SOFT TISSUES/SKULL: No acute abnormality of the visualized skull or soft tissues. No acute intracranial abnormality. LABS:  Labs Reviewed   CBC WITH AUTO DIFFERENTIAL - Abnormal; Notable for the following components:       Result Value    Seg Neutrophils 67 (*)     Lymphocytes 23 (*)     Immature Granulocytes 1 (*)     All other components within normal limits   BASIC METABOLIC PANEL - Abnormal; Notable for the following components:    Glucose 130 (*)     CREATININE 1.29 (*)     Chloride 96 (*)     GFR Non- 39 (*)     GFR  48 (*)     All other components within normal limits       All other labs were within normal range or not returned as of this dictation. EMERGENCY DEPARTMENT COURSE and DIFFERENTIAL DIAGNOSIS/MDM:   Vitals:    Vitals:    01/06/20 1800   BP: (!) 181/77   Pulse: 110   Resp: 17   Temp: 98.6 °F (37 °C)   TempSrc: Oral   SpO2: 98%       No orders of the defined types were placed in this encounter. Medical Decision Making: CT is negative.   Blood work is essentially normal.  She is able to be discharged home. She states she is sensitive to medications and does not want any prescription medications. Treatment diagnosis and follow-up were discussed with the patient. CONSULTS:  None    PROCEDURES:  None    FINAL IMPRESSION      1.  Acute nonintractable headache, unspecified headache type          DISPOSITION/PLAN   DISPOSITION Decision To Discharge 01/06/2020 08:08:03 PM      PATIENT REFERRED TO:   Julia Suarez MD  [address] [facility]  [phone]      As needed    [facility] ED  [address] [facility]  [phone]    If symptoms worsen      DISCHARGE MEDICATIONS:     New Prescriptions    No medications on file         (Please note that portions of this note were completed with a voice recognition program.  Efforts were made to edit the dictations but occasionally words are mis-transcribed.)    Alessio Amaro MD  Attending Emergency Physician           Alessio Amaro MD  01/06/20 2008

## 2020-01-07 NOTE — ED NOTES
Pt arrived to ED with c/o Headache that started Saturday evening. Pt states headache resolved with Tylenol and she felt fine Sunday. Pt states headache returned today with left sinus pressure. Pt speech clear. Pt states she takes BP meds appropriately but BP is elevated from her chronic back pain. Pt denies chest pain. Pt denies shortness of breath. Pt ambulated to room without complication. Pt denies fever. Respirations non labored. Skin warm and dry. Skin color appropriately to race. Pt A&Ox4.       Nataliya Villegas RN  01/06/20 1921

## 2020-09-22 ENCOUNTER — APPOINTMENT (OUTPATIENT)
Dept: GENERAL RADIOLOGY | Age: 85
End: 2020-09-22
Payer: MEDICARE

## 2020-09-22 ENCOUNTER — HOSPITAL ENCOUNTER (EMERGENCY)
Age: 85
Discharge: HOME | End: 2020-09-22
Attending: EMERGENCY MEDICINE
Payer: MEDICARE

## 2020-09-22 VITALS
OXYGEN SATURATION: 98 % | HEART RATE: 109 BPM | SYSTOLIC BLOOD PRESSURE: 145 MMHG | BODY MASS INDEX: 25.57 KG/M2 | WEIGHT: 149.8 LBS | RESPIRATION RATE: 14 BRPM | TEMPERATURE: 98.6 F | HEIGHT: 64 IN | DIASTOLIC BLOOD PRESSURE: 69 MMHG

## 2020-09-22 LAB
ABSOLUTE EOS #: 0.03 K/UL (ref 0–0.44)
ABSOLUTE IMMATURE GRANULOCYTE: 0.04 K/UL (ref 0–0.3)
ABSOLUTE LYMPH #: 1.28 K/UL (ref 1.1–3.7)
ABSOLUTE MONO #: 0.58 K/UL (ref 0.1–1.2)
ANION GAP SERPL CALCULATED.3IONS-SCNC: 13 MMOL/L (ref 9–17)
BASOPHILS # BLD: 1 % (ref 0–2)
BASOPHILS ABSOLUTE: 0.06 K/UL (ref 0–0.2)
BNP INTERPRETATION: NORMAL
BUN BLDV-MCNC: 25 MG/DL (ref 8–23)
BUN/CREAT BLD: 20 (ref 9–20)
CALCIUM SERPL-MCNC: 9.8 MG/DL (ref 8.6–10.4)
CHLORIDE BLD-SCNC: 98 MMOL/L (ref 98–107)
CO2: 28 MMOL/L (ref 20–31)
CREAT SERPL-MCNC: 1.24 MG/DL (ref 0.5–0.9)
D-DIMER QUANTITATIVE: 0.33 MG/L FEU (ref 0–0.59)
DIFFERENTIAL TYPE: ABNORMAL
EOSINOPHILS RELATIVE PERCENT: 0 % (ref 1–4)
GFR AFRICAN AMERICAN: 50 ML/MIN
GFR NON-AFRICAN AMERICAN: 41 ML/MIN
GFR SERPL CREATININE-BSD FRML MDRD: ABNORMAL ML/MIN/{1.73_M2}
GFR SERPL CREATININE-BSD FRML MDRD: ABNORMAL ML/MIN/{1.73_M2}
GLUCOSE BLD-MCNC: 187 MG/DL (ref 70–99)
HCT VFR BLD CALC: 35.2 % (ref 36.3–47.1)
HEMOGLOBIN: 11.3 G/DL (ref 11.9–15.1)
IMMATURE GRANULOCYTES: 1 %
LYMPHOCYTES # BLD: 17 % (ref 24–43)
MCH RBC QN AUTO: 30.5 PG (ref 25.2–33.5)
MCHC RBC AUTO-ENTMCNC: 32.1 G/DL (ref 28.4–34.8)
MCV RBC AUTO: 95.1 FL (ref 82.6–102.9)
MONOCYTES # BLD: 8 % (ref 3–12)
MYOGLOBIN: 83 NG/ML (ref 25–58)
NRBC AUTOMATED: ABNORMAL PER 100 WBC
PDW BLD-RTO: 13.1 % (ref 11.8–14.4)
PLATELET # BLD: 238 K/UL (ref 138–453)
PLATELET ESTIMATE: ABNORMAL
PMV BLD AUTO: 9.5 FL (ref 8.1–13.5)
POTASSIUM SERPL-SCNC: 3.9 MMOL/L (ref 3.7–5.3)
PRO-BNP: 189 PG/ML
RBC # BLD: 3.7 M/UL (ref 3.95–5.11)
RBC # BLD: ABNORMAL 10*6/UL
SEG NEUTROPHILS: 74 % (ref 36–65)
SEGMENTED NEUTROPHILS ABSOLUTE COUNT: 5.57 K/UL (ref 1.5–8.1)
SODIUM BLD-SCNC: 139 MMOL/L (ref 135–144)
TROPONIN INTERP: ABNORMAL
TROPONIN T: ABNORMAL NG/ML
TROPONIN, HIGH SENSITIVITY: 16 NG/L (ref 0–14)
WBC # BLD: 7.6 K/UL (ref 3.5–11.3)
WBC # BLD: ABNORMAL 10*3/UL

## 2020-09-22 PROCEDURE — 85379 FIBRIN DEGRADATION QUANT: CPT

## 2020-09-22 PROCEDURE — 84484 ASSAY OF TROPONIN QUANT: CPT

## 2020-09-22 PROCEDURE — 71045 X-RAY EXAM CHEST 1 VIEW: CPT

## 2020-09-22 PROCEDURE — 83880 ASSAY OF NATRIURETIC PEPTIDE: CPT

## 2020-09-22 PROCEDURE — 85025 COMPLETE CBC W/AUTO DIFF WBC: CPT

## 2020-09-22 PROCEDURE — 83874 ASSAY OF MYOGLOBIN: CPT

## 2020-09-22 PROCEDURE — 93005 ELECTROCARDIOGRAM TRACING: CPT | Performed by: PHYSICIAN ASSISTANT

## 2020-09-22 PROCEDURE — 80048 BASIC METABOLIC PNL TOTAL CA: CPT

## 2020-09-22 PROCEDURE — 99285 EMERGENCY DEPT VISIT HI MDM: CPT

## 2020-09-22 ASSESSMENT — PAIN SCALES - GENERAL: PAINLEVEL_OUTOF10: 4

## 2020-09-22 NOTE — ED PROVIDER NOTES
Hyperlipidemia     Hypertension     Lumbar disc disease     LOW BACK PAIN INTERMITTENT/STATES LARGE BULGE    Neuropathy     left leg numbness    Numbness     LEFT LEG/KNEE TO ANKLE/WEAK LEG-USES WALKER     Osteoarthritis     Watermelon stomach 2016    Wears glasses        SURGICAL HISTORY           Procedure Laterality Date    BACK SURGERY  13    micro [id number]    CATARACT REMOVAL      wesly cataract    CHOLECYSTECTOMY      HEMORRHOID SURGERY      NASAL ENDOSCOPY      [place] ESOPHAGOGASTRODUODENOSCOPY TRANSORAL DIAGNOSTIC N/A 2017    EGD ESOPHAGOGASTRODUODENOSCOPY WITH APC performed by [facility]pin [facility] at [facility]    UPPER GASTROINTESTINAL ENDOSCOPY           FAMILY HISTORY           Problem Relation Age of Onset    Kidney Disease Sister     Stroke Other     Diabetes Other      Family Status   Relation Name Status    Sister      Other SISTER         SOCIAL HISTORY      reports that she has quit smoking. She has never used smokeless tobacco. She reports current alcohol use. She reports that she does not use drugs. REVIEW OFSYSTEMS    (2-9 systems for level 4, 10 or more for level 5)   Review of Systems    Except as noted above the remainder of the review of systems was reviewed and negative. PHYSICAL EXAM    (up to 7 for level 4, 8 or more for level 5)     ED Triage Vitals   BP Temp Temp src Pulse Resp SpO2 Height Weight   20 1144 20 1144 -- 20 1144 20 1144 20 1144 20 1142 20 1142   (!) 173/75 98.6 °F (37 °C)  109 14 98 % 5' 4\" (1.626 m) 149 lb 12.8 oz (67.9 kg)      Physical Exam  Constitutional:       Appearance: She is well-developed. HENT:      Head: Normocephalic and atraumatic. Neck:      Musculoskeletal: Normal range of motion and neck supple. Cardiovascular:      Rate and Rhythm: Normal rate and regular rhythm.    Pulmonary:      Effort: Pulmonary effort is normal.      Breath sounds: Normal breath 09/22/20 1144 09/22/20 1243   BP:  (!) 173/75 (!) 145/69   Pulse:  109    Resp:  14    Temp:  98.6 °F (37 °C)    SpO2:  98%    Weight: 149 lb 12.8 oz (67.9 kg)     Height: 5' 4\" (1.626 m)       D-dimer negative. Doubt pulmonary embolism. Work-up negative. Patient will be discharged home instructed to follow-up with his primary care doctor. CONSULTS:  None    PROCEDURES:  Procedures        FINAL IMPRESSION      1.  Pleuritic chest pain          DISPOSITION/PLAN   DISPOSITION Decision To Discharge 09/22/2020 01:18:42 PM      PATIENTREFERRED TO:   Jack Paul MD  [address] [address]  [phone]    In 3 days        DISCHARGE MEDICATIONS:     Discharge Medication List as of 9/22/2020  1:19 PM              (Please note that portions of this note were completed with a voice recognition program.  Efforts were made to edit thedictations but occasionally words are mis-transcribed.)    AMAIRANI Nava PA-C  09/22/20 8615

## 2020-09-22 NOTE — ED PROVIDER NOTES
The patient was seen and examined by me in conjunction with the mid-level provider. I agree with his/her assessment and treatment plan. The patient's work-up here is negative. At this point I suspect that this is musculoskeletal pain. Findings were discussed with the patient and her family.      Maria E Grant MD  09/22/20 6349

## 2020-09-23 LAB
EKG ATRIAL RATE: 101 BPM
EKG P AXIS: 59 DEGREES
EKG P-R INTERVAL: 152 MS
EKG Q-T INTERVAL: 358 MS
EKG QRS DURATION: 98 MS
EKG QTC CALCULATION (BAZETT): 464 MS
EKG R AXIS: 45 DEGREES
EKG T AXIS: 5 DEGREES
EKG VENTRICULAR RATE: 101 BPM

## 2020-09-23 PROCEDURE — 93010 ELECTROCARDIOGRAM REPORT: CPT | Performed by: INTERNAL MEDICINE

## 2020-11-26 ENCOUNTER — HOSPITAL ENCOUNTER (EMERGENCY)
Age: 85
Discharge: HOME | End: 2020-11-26
Attending: EMERGENCY MEDICINE
Payer: MEDICARE

## 2020-11-26 ENCOUNTER — APPOINTMENT (OUTPATIENT)
Dept: CT IMAGING | Age: 85
End: 2020-11-26
Payer: MEDICARE

## 2020-11-26 VITALS
HEART RATE: 112 BPM | OXYGEN SATURATION: 97 % | TEMPERATURE: 98.6 F | WEIGHT: 150.8 LBS | RESPIRATION RATE: 16 BRPM | DIASTOLIC BLOOD PRESSURE: 62 MMHG | SYSTOLIC BLOOD PRESSURE: 139 MMHG | HEIGHT: 63 IN | BODY MASS INDEX: 26.72 KG/M2

## 2020-11-26 LAB
ABSOLUTE EOS #: <0.03 K/UL (ref 0–0.44)
ABSOLUTE IMMATURE GRANULOCYTE: 0.08 K/UL (ref 0–0.3)
ABSOLUTE LYMPH #: 1.19 K/UL (ref 1.1–3.7)
ABSOLUTE MONO #: 1.05 K/UL (ref 0.1–1.2)
ALBUMIN SERPL-MCNC: 3.9 G/DL (ref 3.5–5.2)
ALBUMIN/GLOBULIN RATIO: ABNORMAL (ref 1–2.5)
ALP BLD-CCNC: 72 U/L (ref 35–104)
ALT SERPL-CCNC: 11 U/L (ref 5–33)
ANION GAP SERPL CALCULATED.3IONS-SCNC: 10 MMOL/L (ref 9–17)
AST SERPL-CCNC: 19 U/L
BASOPHILS # BLD: 1 % (ref 0–2)
BASOPHILS ABSOLUTE: 0.06 K/UL (ref 0–0.2)
BILIRUB SERPL-MCNC: 0.27 MG/DL (ref 0.3–1.2)
BILIRUBIN DIRECT: <0.08 MG/DL
BILIRUBIN URINE: NEGATIVE
BILIRUBIN, INDIRECT: ABNORMAL MG/DL (ref 0–1)
BUN BLDV-MCNC: 23 MG/DL (ref 8–23)
BUN/CREAT BLD: 18 (ref 9–20)
CALCIUM SERPL-MCNC: 9.2 MG/DL (ref 8.6–10.4)
CHLORIDE BLD-SCNC: 100 MMOL/L (ref 98–107)
CO2: 29 MMOL/L (ref 20–31)
COLOR: YELLOW
COMMENT UA: NORMAL
CREAT SERPL-MCNC: 1.31 MG/DL (ref 0.5–0.9)
DIFFERENTIAL TYPE: ABNORMAL
EOSINOPHILS RELATIVE PERCENT: 0 % (ref 1–4)
GFR AFRICAN AMERICAN: 47 ML/MIN
GFR NON-AFRICAN AMERICAN: 39 ML/MIN
GFR SERPL CREATININE-BSD FRML MDRD: ABNORMAL ML/MIN/{1.73_M2}
GFR SERPL CREATININE-BSD FRML MDRD: ABNORMAL ML/MIN/{1.73_M2}
GLOBULIN: ABNORMAL G/DL (ref 1.5–3.8)
GLUCOSE BLD-MCNC: 166 MG/DL (ref 70–99)
GLUCOSE URINE: NEGATIVE
HCT VFR BLD CALC: 35.2 % (ref 36.3–47.1)
HEMOGLOBIN: 11.5 G/DL (ref 11.9–15.1)
IMMATURE GRANULOCYTES: 1 %
INR BLD: 1.1
KETONES, URINE: NEGATIVE
LEUKOCYTE ESTERASE, URINE: NEGATIVE
LIPASE: 8 U/L (ref 13–60)
LYMPHOCYTES # BLD: 10 % (ref 24–43)
MCH RBC QN AUTO: 30.9 PG (ref 25.2–33.5)
MCHC RBC AUTO-ENTMCNC: 32.7 G/DL (ref 28.4–34.8)
MCV RBC AUTO: 94.6 FL (ref 82.6–102.9)
MONOCYTES # BLD: 9 % (ref 3–12)
NITRITE, URINE: NEGATIVE
NRBC AUTOMATED: 0 PER 100 WBC
PARTIAL THROMBOPLASTIN TIME: 26.3 SEC (ref 23.9–33.8)
PDW BLD-RTO: 13.4 % (ref 11.8–14.4)
PH UA: 6.5 (ref 5–8)
PLATELET # BLD: 233 K/UL (ref 138–453)
PLATELET ESTIMATE: ABNORMAL
PMV BLD AUTO: 10.7 FL (ref 8.1–13.5)
POTASSIUM SERPL-SCNC: 3.9 MMOL/L (ref 3.7–5.3)
PROTEIN UA: NEGATIVE
PROTHROMBIN TIME: 13.9 SEC (ref 11.5–14.2)
RBC # BLD: 3.72 M/UL (ref 3.95–5.11)
RBC # BLD: ABNORMAL 10*6/UL
SEG NEUTROPHILS: 79 % (ref 36–65)
SEGMENTED NEUTROPHILS ABSOLUTE COUNT: 9.71 K/UL (ref 1.5–8.1)
SODIUM BLD-SCNC: 139 MMOL/L (ref 135–144)
SPECIFIC GRAVITY UA: 1.01 (ref 1–1.03)
TOTAL PROTEIN: 6.6 G/DL (ref 6.4–8.3)
TURBIDITY: CLEAR
URINE HGB: NEGATIVE
UROBILINOGEN, URINE: NORMAL
WBC # BLD: 12.1 K/UL (ref 3.5–11.3)
WBC # BLD: ABNORMAL 10*3/UL

## 2020-11-26 PROCEDURE — 85025 COMPLETE CBC W/AUTO DIFF WBC: CPT

## 2020-11-26 PROCEDURE — 74176 CT ABD & PELVIS W/O CONTRAST: CPT

## 2020-11-26 PROCEDURE — 85730 THROMBOPLASTIN TIME PARTIAL: CPT

## 2020-11-26 PROCEDURE — 80076 HEPATIC FUNCTION PANEL: CPT

## 2020-11-26 PROCEDURE — 2580000003 HC RX 258: Performed by: EMERGENCY MEDICINE

## 2020-11-26 PROCEDURE — 6360000002 HC RX W HCPCS: Performed by: EMERGENCY MEDICINE

## 2020-11-26 PROCEDURE — 36415 COLL VENOUS BLD VENIPUNCTURE: CPT

## 2020-11-26 PROCEDURE — 99283 EMERGENCY DEPT VISIT LOW MDM: CPT

## 2020-11-26 PROCEDURE — 81003 URINALYSIS AUTO W/O SCOPE: CPT

## 2020-11-26 PROCEDURE — 80048 BASIC METABOLIC PNL TOTAL CA: CPT

## 2020-11-26 PROCEDURE — 85610 PROTHROMBIN TIME: CPT

## 2020-11-26 PROCEDURE — 6370000000 HC RX 637 (ALT 250 FOR IP): Performed by: EMERGENCY MEDICINE

## 2020-11-26 PROCEDURE — 83690 ASSAY OF LIPASE: CPT

## 2020-11-26 PROCEDURE — 96374 THER/PROPH/DIAG INJ IV PUSH: CPT

## 2020-11-26 RX ORDER — SODIUM CHLORIDE 0.9 % (FLUSH) 0.9 %
10 SYRINGE (ML) INJECTION PRN
Status: DISCONTINUED | OUTPATIENT
Start: 2020-11-26 | End: 2020-11-26 | Stop reason: HOSPADM

## 2020-11-26 RX ORDER — ONDANSETRON 4 MG/1
4 TABLET, ORALLY DISINTEGRATING ORAL EVERY 8 HOURS PRN
Qty: 20 TABLET | Refills: 0 | Status: SHIPPED | OUTPATIENT
Start: 2020-11-26

## 2020-11-26 RX ORDER — METRONIDAZOLE 500 MG/1
500 TABLET ORAL ONCE
Status: COMPLETED | OUTPATIENT
Start: 2020-11-26 | End: 2020-11-26

## 2020-11-26 RX ORDER — CIPROFLOXACIN 500 MG/1
500 TABLET, FILM COATED ORAL 2 TIMES DAILY
Qty: 14 TABLET | Refills: 0 | Status: SHIPPED | OUTPATIENT
Start: 2020-11-26 | End: 2020-12-03

## 2020-11-26 RX ORDER — METRONIDAZOLE 500 MG/1
500 TABLET ORAL 3 TIMES DAILY
Qty: 21 TABLET | Refills: 0 | Status: SHIPPED | OUTPATIENT
Start: 2020-11-26 | End: 2020-12-03

## 2020-11-26 RX ORDER — FENTANYL CITRATE 50 UG/ML
50 INJECTION, SOLUTION INTRAMUSCULAR; INTRAVENOUS ONCE
Status: DISCONTINUED | OUTPATIENT
Start: 2020-11-26 | End: 2020-11-26 | Stop reason: HOSPADM

## 2020-11-26 RX ORDER — ONDANSETRON 2 MG/ML
4 INJECTION INTRAMUSCULAR; INTRAVENOUS ONCE
Status: COMPLETED | OUTPATIENT
Start: 2020-11-26 | End: 2020-11-26

## 2020-11-26 RX ORDER — CIPROFLOXACIN 500 MG/1
500 TABLET, FILM COATED ORAL ONCE
Status: COMPLETED | OUTPATIENT
Start: 2020-11-26 | End: 2020-11-26

## 2020-11-26 RX ORDER — 0.9 % SODIUM CHLORIDE 0.9 %
1000 INTRAVENOUS SOLUTION INTRAVENOUS ONCE
Status: COMPLETED | OUTPATIENT
Start: 2020-11-26 | End: 2020-11-26

## 2020-11-26 RX ORDER — 0.9 % SODIUM CHLORIDE 0.9 %
80 INTRAVENOUS SOLUTION INTRAVENOUS ONCE
Status: DISCONTINUED | OUTPATIENT
Start: 2020-11-26 | End: 2020-11-26 | Stop reason: HOSPADM

## 2020-11-26 RX ORDER — ACETAMINOPHEN 500 MG
500 TABLET ORAL 3 TIMES DAILY
COMMUNITY

## 2020-11-26 RX ADMIN — CIPROFLOXACIN 500 MG: 500 TABLET ORAL at 12:28

## 2020-11-26 RX ADMIN — METRONIDAZOLE 500 MG: 500 TABLET ORAL at 12:28

## 2020-11-26 RX ADMIN — ONDANSETRON 4 MG: 2 INJECTION INTRAMUSCULAR; INTRAVENOUS at 09:55

## 2020-11-26 RX ADMIN — SODIUM CHLORIDE 1000 ML: 9 INJECTION, SOLUTION INTRAVENOUS at 09:55

## 2020-11-26 ASSESSMENT — ENCOUNTER SYMPTOMS
EYE REDNESS: 0
DIARRHEA: 0
EYE DISCHARGE: 0
COLOR CHANGE: 0
BLOOD IN STOOL: 1
SHORTNESS OF BREATH: 0
ABDOMINAL PAIN: 1
COUGH: 0
SORE THROAT: 0
RHINORRHEA: 0
NAUSEA: 1
VOMITING: 0

## 2020-11-26 NOTE — ED PROVIDER NOTES
EMERGENCY DEPARTMENT ENCOUNTER    Pt Name: Ingris Garcia  MRN: [id number]  [place] 1934  Date of evaluation: 11/26/20  CHIEF COMPLAINT       Chief Complaint   Patient presents with    Abdominal Pain     LLQ onset yesterday    Rectal Bleeding     HISTORY OF PRESENT ILLNESS   This is an 80year-old female that presents with complaints of abdominal pain, diarrhea and bright red blood per rectum. The patient states that last evening she began having some left-sided lower abdominal pain associated with some crampy abdominal pain and subsequently developed some diarrhea. Around 4:00 this morning she had multiple episodes of bright red blood per rectum. She denies any previous history of GI bleeding, she denies any nausea and states that since early this morning she has not had any further episodes of bloody stool. Patient denies any dizziness, she has no chest pain or shortness of breath. She describes her symptoms as mild. REVIEW OF SYSTEMS     Review of Systems   Constitutional: Negative for chills and fever. HENT: Negative for rhinorrhea and sore throat. Eyes: Negative for discharge, redness and visual disturbance. Respiratory: Negative for cough and shortness of breath. Cardiovascular: Negative for chest pain, palpitations and leg swelling. Gastrointestinal: Positive for abdominal pain, blood in stool and nausea. Negative for diarrhea and vomiting. Musculoskeletal: Negative for arthralgias, myalgias and neck pain. Skin: Negative for color change and rash. Neurological: Negative for seizures, weakness and headaches. Psychiatric/Behavioral: Negative for hallucinations, self-injury and suicidal ideas.      PASTMEDICAL HISTORY     Past Medical History:   Diagnosis Date    Anemia     Anxiety     anxiety, depression    Depression     Fibromyalgia     Gastritis     GERD (gastroesophageal reflux disease)     Hyperlipidemia     Hypertension     Lumbar disc disease LOW BACK PAIN INTERMITTENT/STATES LARGE BULGE    Neuropathy     left leg numbness    Numbness     LEFT LEG/KNEE TO ANKLE/WEAK LEG-USES WALKER     Osteoarthritis     Watermelon stomach 2016    Wears glasses      Past Problem List  Patient Active Problem List   Diagnosis Code    S/P lumbar microdiscectomy Z98.890    HTN (hypertension) I10    Hyperlipidemia E78.5    Anxiety F41.9    Depression F32.9    CKD (chronic kidney disease) N18.9    GERD (gastroesophageal reflux disease) K21.9    Chest pain R07.9    Anemia D64.9     SURGICAL HISTORY       Past Surgical History:   Procedure Laterality Date    BACK SURGERY  13    micro [id number]    CATARACT REMOVAL      wesly cataract    CHOLECYSTECTOMY      HEMORRHOID SURGERY      NASAL ENDOSCOPY      [place] ESOPHAGOGASTRODUODENOSCOPY TRANSORAL DIAGNOSTIC N/A 2017    EGD ESOPHAGOGASTRODUODENOSCOPY WITH APC performed by Janae Davies MD at [facility]    UPPER GASTROINTESTINAL ENDOSCOPY       CURRENT MEDICATIONS       Previous Medications    ACETAMINOPHEN (TYLENOL) 500 MG TABLET    Take 500 mg by mouth 3 times daily    ALPRAZOLAM (XANAX) 0.5 MG TABLET    Take 0.25 mg by mouth nightly as needed for Sleep. Take 1/2 of 0.5mg tablet    AMLODIPINE (NORVASC) 10 MG TABLET    Take 10 mg by mouth daily    FLUTICASONE (FLONASE) 50 MCG/ACT NASAL SPRAY    2 sprays by Nasal route daily    HYDROCHLOROTHIAZIDE (HYDRODIURIL) 25 MG TABLET    Take 1 tablet by mouth daily. LORATADINE (CLARITIN) 10 MG TABLET    Take 1 tablet by mouth daily    NONFORMULARY    CBD oil prn    NORTRIPTYLINE (PAMELOR) 25 MG CAPSULE    Take 25 mg by mouth daily    POTASSIUM CHLORIDE (KLOR-CON M) 10 MEQ EXTENDED RELEASE TABLET    Take 1 tablet by mouth daily for 15 days     ALLERGIES     is allergic to amoxicillin-pot clavulanate and amoxicillin. FAMILY HISTORY     She indicated that her sister is . She indicated that her other is .      SOCIAL HISTORY       Social History     Tobacco Use    Smoking status: Former Smoker    Smokeless tobacco: Never Used    Tobacco comment: QUIT OVER 48 YRS AGO  13  Denies changes   Substance Use Topics    Alcohol use: Yes     Comment: RARE DRINK    Drug use: No     PHYSICAL EXAM     INITIAL VITALS: /62   Pulse 112   Temp 98.6 °F (37 °C)   Resp 16   Ht 5' 3\" (1.6 m)   Wt 150 lb 12.8 oz (68.4 kg)   SpO2 97%   BMI 26.71 kg/m²    Physical Exam  Constitutional:       Appearance: Normal appearance. She is well-developed. She is not ill-appearing or toxic-appearing. HENT:      Head: Normocephalic and atraumatic. Eyes:      Conjunctiva/sclera: Conjunctivae normal.      Pupils: Pupils are equal, round, and reactive to light. Neck:      Musculoskeletal: Normal range of motion and neck supple. Trachea: Trachea normal.   Cardiovascular:      Rate and Rhythm: Normal rate and regular rhythm. Heart sounds: S1 normal and S2 normal. No murmur. Pulmonary:      Effort: Pulmonary effort is normal. No accessory muscle usage or respiratory distress. Breath sounds: Normal breath sounds. Chest:      Chest wall: No deformity or tenderness. Abdominal:      General: Bowel sounds are normal. There is no distension or abdominal bruit. Palpations: Abdomen is not rigid. Tenderness: There is generalized abdominal tenderness. There is no guarding or rebound. Negative signs include Chung's sign and McBurney's sign. Skin:     General: Skin is warm. Findings: No rash. Neurological:      Mental Status: She is alert and oriented to person, place, and time. GCS: GCS eye subscore is 4. GCS verbal subscore is 5. GCS motor subscore is 6. Psychiatric:         Speech: Speech normal.         MEDICAL DECISION MAKIN-year-old female presents with complaints of some abdominal pain, diarrhea and bloody stool, the patient was not hypotensive for dizzy at this time, plan is basic labs, PT/INR and reevaluation. We will also obtain a CT of the abdomen and pelvis. 12:17 PM EST  Discussed with the patient, the CT scan shows evidence of some colitis, her laboratory studies not significantly abnormal, plan is dose of oral antibiotics here, discharged with a prescription for oral antibiotics. Patient has had any further episodes of rectal bleeding since last evening. She is not hypotensive, her vital signs are stable. CRITICAL CARE:       PROCEDURES:    Procedures    DIAGNOSTIC RESULTS   EKG:All EKG's are interpreted by the Emergency Department Physician who either signs or Co-signs this chart in the absence of a cardiologist.        RADIOLOGY:All plain film, CT, MRI, and formal ultrasound images (except ED bedside ultrasound) are read by the radiologist, see reports below, unless otherwisenoted in MDM or here. CT ABDOMEN PELVIS WO CONTRAST Additional Contrast? None   Final Result   Colitis involving the splenic flexure and proximal descending colon. Diverticulosis is present, however no diverticula are present in this area. Therefore this colitis may be secondary to underlying inflammatory process,   infection or vascular etiology. No evidence for bowel obstruction. LABS: All lab results were reviewed by myself, and all abnormals are listed below.   Labs Reviewed   CBC WITH AUTO DIFFERENTIAL - Abnormal; Notable for the following components:       Result Value    WBC 12.1 (*)     RBC 3.72 (*)     Hemoglobin 11.5 (*)     Hematocrit 35.2 (*)     Seg Neutrophils 79 (*)     Lymphocytes 10 (*)     Eosinophils % 0 (*)     Immature Granulocytes 1 (*)     Segs Absolute 9.71 (*)     All other components within normal limits   BASIC METABOLIC PANEL - Abnormal; Notable for the following components:    Glucose 166 (*)     CREATININE 1.31 (*)     GFR Non- 39 (*)     GFR  47 (*)     All other components within normal limits   LIPASE - Abnormal; Notable for the following components: Lipase 8 (*)     All other components within normal limits   HEPATIC FUNCTION PANEL - Abnormal; Notable for the following components: Total Bilirubin 0.27 (*)     All other components within normal limits   URINALYSIS   PROTIME-INR   APTT       EMERGENCY DEPARTMENTCOURSE:         Vitals:    Vitals:    11/26/20 0856   BP: 139/62   Pulse: 112   Resp: 16   Temp: 98.6 °F (37 °C)   SpO2: 97%   Weight: 150 lb 12.8 oz (68.4 kg)   Height: 5' 3\" (1.6 m)       The patient was given the following medications while in the emergency department:  Orders Placed This Encounter   Medications    0.9 % sodium chloride bolus    ondansetron (ZOFRAN) injection 4 mg    fentaNYL (SUBLIMAZE) injection 50 mcg    0.9 % sodium chloride bolus    sodium chloride flush 0.9 % injection 10 mL    iopamidol (ISOVUE-370) 76 % injection 75 mL    metroNIDAZOLE (FLAGYL) 500 MG tablet     Sig: Take 1 tablet by mouth 3 times daily for 7 days     Dispense:  21 tablet     Refill:  0    metroNIDAZOLE (FLAGYL) tablet 500 mg     Order Specific Question:   Antimicrobial Indications     Answer:   Intra-Abdominal Infection    ciprofloxacin (CIPRO) 500 MG tablet     Sig: Take 1 tablet by mouth 2 times daily for 7 days     Dispense:  14 tablet     Refill:  0    ciprofloxacin (CIPRO) tablet 500 mg     Order Specific Question:   Antimicrobial Indications     Answer:   Intra-Abdominal Infection    ondansetron (ZOFRAN ODT) 4 MG disintegrating tablet     Sig: Take 1 tablet by mouth every 8 hours as needed for Nausea     Dispense:  20 tablet     Refill:  0     CONSULTS:  None    FINAL IMPRESSION      1.  Colitis          DISPOSITION/PLAN   DISPOSITION Decision To Discharge 11/26/2020 12:14:47 PM      PATIENT REFERRED TO:  Alejandra Meeks MD  [address]  [phone]    Schedule an appointment as soon as possible for a visit in 3 days      DISCHARGE MEDICATIONS:  New Prescriptions    CIPROFLOXACIN (CIPRO) 500 MG TABLET Take 1 tablet by mouth 2 times daily for 7 days    METRONIDAZOLE (FLAGYL) 500 MG TABLET    Take 1 tablet by mouth 3 times daily for 7 days    ONDANSETRON (ZOFRAN ODT) 4 MG DISINTEGRATING TABLET    Take 1 tablet by mouth every 8 hours as needed for Nausea     Huy Perez MD  Attending Emergency Physician                   Huy Perez MD  11/26/20 [address]

## 2021-05-01 ENCOUNTER — HOSPITAL ENCOUNTER (INPATIENT)
Age: 86
LOS: 7 days | Discharge: HOME HEALTH | DRG: 871 | End: 2021-05-08
Attending: EMERGENCY MEDICINE | Admitting: INTERNAL MEDICINE
Payer: MEDICARE

## 2021-05-01 ENCOUNTER — APPOINTMENT (OUTPATIENT)
Dept: GENERAL RADIOLOGY | Age: 86
DRG: 871 | End: 2021-05-01
Payer: MEDICARE

## 2021-05-01 ENCOUNTER — APPOINTMENT (OUTPATIENT)
Dept: CT IMAGING | Age: 86
DRG: 871 | End: 2021-05-01
Payer: MEDICARE

## 2021-05-01 DIAGNOSIS — U07.1 COVID-19: Primary | ICD-10-CM

## 2021-05-01 LAB
-: NORMAL
ABSOLUTE EOS #: 0 K/UL (ref 0–0.4)
ABSOLUTE IMMATURE GRANULOCYTE: 0 K/UL (ref 0–0.3)
ABSOLUTE LYMPH #: 1.14 K/UL (ref 1–4.8)
ABSOLUTE MONO #: 1.14 K/UL (ref 0.1–0.8)
ALBUMIN SERPL-MCNC: 3.6 G/DL (ref 3.5–5.2)
ALBUMIN/GLOBULIN RATIO: 1 (ref 1–2.5)
ALP BLD-CCNC: 64 U/L (ref 35–104)
ALT SERPL-CCNC: 13 U/L (ref 5–33)
AMORPHOUS: NORMAL
ANION GAP SERPL CALCULATED.3IONS-SCNC: 13 MMOL/L (ref 9–17)
AST SERPL-CCNC: 30 U/L
BACTERIA: NORMAL
BASOPHILS # BLD: 1 % (ref 0–2)
BASOPHILS ABSOLUTE: 0.1 K/UL (ref 0–0.2)
BILIRUB SERPL-MCNC: 0.31 MG/DL (ref 0.3–1.2)
BILIRUBIN URINE: NEGATIVE
BUN BLDV-MCNC: 22 MG/DL (ref 8–23)
BUN/CREAT BLD: ABNORMAL (ref 9–20)
C-REACTIVE PROTEIN: 175.3 MG/L (ref 0–5)
CALCIUM SERPL-MCNC: 8.8 MG/DL (ref 8.6–10.4)
CASTS UA: NORMAL /LPF (ref 0–8)
CHLORIDE BLD-SCNC: 90 MMOL/L (ref 98–107)
CO2: 26 MMOL/L (ref 20–31)
COLOR: YELLOW
COMMENT UA: ABNORMAL
CREAT SERPL-MCNC: 1.28 MG/DL (ref 0.5–0.9)
CRYSTALS, UA: NORMAL /HPF
DIFFERENTIAL TYPE: ABNORMAL
EOSINOPHILS RELATIVE PERCENT: 0 % (ref 1–4)
EPITHELIAL CELLS UA: NORMAL /HPF (ref 0–5)
GFR AFRICAN AMERICAN: 48 ML/MIN
GFR NON-AFRICAN AMERICAN: 39 ML/MIN
GFR SERPL CREATININE-BSD FRML MDRD: ABNORMAL ML/MIN/{1.73_M2}
GFR SERPL CREATININE-BSD FRML MDRD: ABNORMAL ML/MIN/{1.73_M2}
GLUCOSE BLD-MCNC: 140 MG/DL (ref 70–99)
GLUCOSE URINE: NEGATIVE
HCT VFR BLD CALC: 32.4 % (ref 36.3–47.1)
HEMOGLOBIN: 10.4 G/DL (ref 11.9–15.1)
IMMATURE GRANULOCYTES: 0 %
INR BLD: 1
KETONES, URINE: NEGATIVE
LACTIC ACID, SEPSIS WHOLE BLOOD: 0.6 MMOL/L (ref 0.5–1.9)
LACTIC ACID, SEPSIS: NORMAL MMOL/L (ref 0.5–1.9)
LEUKOCYTE ESTERASE, URINE: NEGATIVE
LYMPHOCYTES # BLD: 12 % (ref 24–44)
MCH RBC QN AUTO: 30.2 PG (ref 25.2–33.5)
MCHC RBC AUTO-ENTMCNC: 32.1 G/DL (ref 28.4–34.8)
MCV RBC AUTO: 94.2 FL (ref 82.6–102.9)
MONOCYTES # BLD: 12 % (ref 1–7)
MORPHOLOGY: NORMAL
MUCUS: NORMAL
NITRITE, URINE: NEGATIVE
NRBC AUTOMATED: 0 PER 100 WBC
OTHER OBSERVATIONS UA: NORMAL
PDW BLD-RTO: 14.1 % (ref 11.8–14.4)
PH UA: 6 (ref 5–8)
PLATELET # BLD: 177 K/UL (ref 138–453)
PLATELET ESTIMATE: ABNORMAL
PMV BLD AUTO: 9.6 FL (ref 8.1–13.5)
POTASSIUM SERPL-SCNC: 3.7 MMOL/L (ref 3.7–5.3)
PROTEIN UA: ABNORMAL
PROTHROMBIN TIME: 10.6 SEC (ref 9.1–12.3)
RBC # BLD: 3.44 M/UL (ref 3.95–5.11)
RBC # BLD: ABNORMAL 10*6/UL
RBC UA: NORMAL /HPF (ref 0–4)
RENAL EPITHELIAL, UA: NORMAL /HPF
SARS-COV-2, RAPID: DETECTED
SEG NEUTROPHILS: 75 % (ref 36–66)
SEGMENTED NEUTROPHILS ABSOLUTE COUNT: 7.12 K/UL (ref 1.8–7.7)
SODIUM BLD-SCNC: 129 MMOL/L (ref 135–144)
SPECIFIC GRAVITY UA: 1.01 (ref 1–1.03)
SPECIMEN DESCRIPTION: ABNORMAL
TOTAL PROTEIN: 7.2 G/DL (ref 6.4–8.3)
TRICHOMONAS: NORMAL
TROPONIN INTERP: ABNORMAL
TROPONIN T: ABNORMAL NG/ML
TROPONIN, HIGH SENSITIVITY: 21 NG/L (ref 0–14)
TSH SERPL DL<=0.05 MIU/L-ACNC: 1.03 MIU/L (ref 0.3–5)
TURBIDITY: CLEAR
URINE HGB: ABNORMAL
UROBILINOGEN, URINE: NORMAL
WBC # BLD: 9.5 K/UL (ref 3.5–11.3)
WBC # BLD: ABNORMAL 10*3/UL
WBC UA: NORMAL /HPF (ref 0–5)
YEAST: NORMAL

## 2021-05-01 PROCEDURE — 6370000000 HC RX 637 (ALT 250 FOR IP): Performed by: INTERNAL MEDICINE

## 2021-05-01 PROCEDURE — 87086 URINE CULTURE/COLONY COUNT: CPT

## 2021-05-01 PROCEDURE — 87635 SARS-COV-2 COVID-19 AMP PRB: CPT

## 2021-05-01 PROCEDURE — 85025 COMPLETE CBC W/AUTO DIFF WBC: CPT

## 2021-05-01 PROCEDURE — 84443 ASSAY THYROID STIM HORMONE: CPT

## 2021-05-01 PROCEDURE — 6360000002 HC RX W HCPCS: Performed by: INTERNAL MEDICINE

## 2021-05-01 PROCEDURE — 6370000000 HC RX 637 (ALT 250 FOR IP): Performed by: NURSE PRACTITIONER

## 2021-05-01 PROCEDURE — 81001 URINALYSIS AUTO W/SCOPE: CPT

## 2021-05-01 PROCEDURE — 85610 PROTHROMBIN TIME: CPT

## 2021-05-01 PROCEDURE — 36415 COLL VENOUS BLD VENIPUNCTURE: CPT

## 2021-05-01 PROCEDURE — 99222 1ST HOSP IP/OBS MODERATE 55: CPT | Performed by: INTERNAL MEDICINE

## 2021-05-01 PROCEDURE — 2060000000 HC ICU INTERMEDIATE R&B

## 2021-05-01 PROCEDURE — 84484 ASSAY OF TROPONIN QUANT: CPT

## 2021-05-01 PROCEDURE — 99223 1ST HOSP IP/OBS HIGH 75: CPT | Performed by: INTERNAL MEDICINE

## 2021-05-01 PROCEDURE — 87040 BLOOD CULTURE FOR BACTERIA: CPT

## 2021-05-01 PROCEDURE — 83605 ASSAY OF LACTIC ACID: CPT

## 2021-05-01 PROCEDURE — 86140 C-REACTIVE PROTEIN: CPT

## 2021-05-01 PROCEDURE — 2580000003 HC RX 258: Performed by: STUDENT IN AN ORGANIZED HEALTH CARE EDUCATION/TRAINING PROGRAM

## 2021-05-01 PROCEDURE — 70450 CT HEAD/BRAIN W/O DYE: CPT

## 2021-05-01 PROCEDURE — 93005 ELECTROCARDIOGRAM TRACING: CPT

## 2021-05-01 PROCEDURE — 80053 COMPREHEN METABOLIC PANEL: CPT

## 2021-05-01 PROCEDURE — 99285 EMERGENCY DEPT VISIT HI MDM: CPT

## 2021-05-01 PROCEDURE — 71045 X-RAY EXAM CHEST 1 VIEW: CPT

## 2021-05-01 PROCEDURE — 2580000003 HC RX 258: Performed by: INTERNAL MEDICINE

## 2021-05-01 RX ORDER — ACETAMINOPHEN 325 MG/1
650 TABLET ORAL EVERY 6 HOURS PRN
Status: DISCONTINUED | OUTPATIENT
Start: 2021-05-01 | End: 2021-05-08 | Stop reason: HOSPADM

## 2021-05-01 RX ORDER — ACETAMINOPHEN 650 MG/1
650 SUPPOSITORY RECTAL EVERY 6 HOURS PRN
Status: DISCONTINUED | OUTPATIENT
Start: 2021-05-01 | End: 2021-05-08 | Stop reason: HOSPADM

## 2021-05-01 RX ORDER — POLYETHYLENE GLYCOL 3350 17 G/17G
17 POWDER, FOR SOLUTION ORAL DAILY PRN
Status: DISCONTINUED | OUTPATIENT
Start: 2021-05-01 | End: 2021-05-08 | Stop reason: HOSPADM

## 2021-05-01 RX ORDER — SODIUM CHLORIDE 0.9 % (FLUSH) 0.9 %
10 SYRINGE (ML) INJECTION PRN
Status: DISCONTINUED | OUTPATIENT
Start: 2021-05-01 | End: 2021-05-01 | Stop reason: SDUPTHER

## 2021-05-01 RX ORDER — SODIUM CHLORIDE 9 MG/ML
25 INJECTION, SOLUTION INTRAVENOUS PRN
Status: DISCONTINUED | OUTPATIENT
Start: 2021-05-01 | End: 2021-05-08 | Stop reason: HOSPADM

## 2021-05-01 RX ORDER — POTASSIUM CHLORIDE 20 MEQ/1
40 TABLET, EXTENDED RELEASE ORAL PRN
Status: DISCONTINUED | OUTPATIENT
Start: 2021-05-01 | End: 2021-05-01

## 2021-05-01 RX ORDER — HYDROCHLOROTHIAZIDE 25 MG/1
25 TABLET ORAL DAILY
Status: DISCONTINUED | OUTPATIENT
Start: 2021-05-01 | End: 2021-05-03

## 2021-05-01 RX ORDER — 0.9 % SODIUM CHLORIDE 0.9 %
1000 INTRAVENOUS SOLUTION INTRAVENOUS ONCE
Status: COMPLETED | OUTPATIENT
Start: 2021-05-01 | End: 2021-05-01

## 2021-05-01 RX ORDER — NICOTINE 21 MG/24HR
1 PATCH, TRANSDERMAL 24 HOURS TRANSDERMAL DAILY PRN
Status: DISCONTINUED | OUTPATIENT
Start: 2021-05-01 | End: 2021-05-08 | Stop reason: HOSPADM

## 2021-05-01 RX ORDER — SODIUM CHLORIDE 0.9 % (FLUSH) 0.9 %
5-40 SYRINGE (ML) INJECTION EVERY 12 HOURS SCHEDULED
Status: DISCONTINUED | OUTPATIENT
Start: 2021-05-01 | End: 2021-05-01 | Stop reason: SDUPTHER

## 2021-05-01 RX ORDER — SODIUM CHLORIDE 0.9 % (FLUSH) 0.9 %
5-40 SYRINGE (ML) INJECTION PRN
Status: DISCONTINUED | OUTPATIENT
Start: 2021-05-01 | End: 2021-05-08 | Stop reason: HOSPADM

## 2021-05-01 RX ORDER — ONDANSETRON 2 MG/ML
4 INJECTION INTRAMUSCULAR; INTRAVENOUS EVERY 6 HOURS PRN
Status: DISCONTINUED | OUTPATIENT
Start: 2021-05-01 | End: 2021-05-01 | Stop reason: SDUPTHER

## 2021-05-01 RX ORDER — ACETAMINOPHEN 325 MG/1
650 TABLET ORAL EVERY 6 HOURS PRN
Status: DISCONTINUED | OUTPATIENT
Start: 2021-05-01 | End: 2021-05-01 | Stop reason: SDUPTHER

## 2021-05-01 RX ORDER — ONDANSETRON 2 MG/ML
4 INJECTION INTRAMUSCULAR; INTRAVENOUS EVERY 6 HOURS PRN
Status: DISCONTINUED | OUTPATIENT
Start: 2021-05-01 | End: 2021-05-08 | Stop reason: HOSPADM

## 2021-05-01 RX ORDER — MAGNESIUM SULFATE 1 G/100ML
1000 INJECTION INTRAVENOUS PRN
Status: DISCONTINUED | OUTPATIENT
Start: 2021-05-01 | End: 2021-05-01

## 2021-05-01 RX ORDER — POTASSIUM CHLORIDE 7.45 MG/ML
10 INJECTION INTRAVENOUS PRN
Status: DISCONTINUED | OUTPATIENT
Start: 2021-05-01 | End: 2021-05-01

## 2021-05-01 RX ORDER — AMLODIPINE BESYLATE 5 MG/1
5 TABLET ORAL DAILY
Status: DISCONTINUED | OUTPATIENT
Start: 2021-05-01 | End: 2021-05-08 | Stop reason: HOSPADM

## 2021-05-01 RX ORDER — SODIUM CHLORIDE 9 MG/ML
25 INJECTION, SOLUTION INTRAVENOUS PRN
Status: DISCONTINUED | OUTPATIENT
Start: 2021-05-01 | End: 2021-05-01 | Stop reason: SDUPTHER

## 2021-05-01 RX ORDER — ACETAMINOPHEN 650 MG/1
650 SUPPOSITORY RECTAL EVERY 6 HOURS PRN
Status: DISCONTINUED | OUTPATIENT
Start: 2021-05-01 | End: 2021-05-01 | Stop reason: SDUPTHER

## 2021-05-01 RX ORDER — POLYETHYLENE GLYCOL 3350 17 G/17G
17 POWDER, FOR SOLUTION ORAL DAILY PRN
Status: DISCONTINUED | OUTPATIENT
Start: 2021-05-01 | End: 2021-05-01 | Stop reason: SDUPTHER

## 2021-05-01 RX ORDER — SODIUM CHLORIDE 0.9 % (FLUSH) 0.9 %
5-40 SYRINGE (ML) INJECTION EVERY 12 HOURS SCHEDULED
Status: DISCONTINUED | OUTPATIENT
Start: 2021-05-01 | End: 2021-05-08 | Stop reason: HOSPADM

## 2021-05-01 RX ORDER — PROMETHAZINE HYDROCHLORIDE 12.5 MG/1
12.5 TABLET ORAL EVERY 6 HOURS PRN
Status: DISCONTINUED | OUTPATIENT
Start: 2021-05-01 | End: 2021-05-08 | Stop reason: HOSPADM

## 2021-05-01 RX ORDER — PROMETHAZINE HYDROCHLORIDE 12.5 MG/1
12.5 TABLET ORAL EVERY 6 HOURS PRN
Status: DISCONTINUED | OUTPATIENT
Start: 2021-05-01 | End: 2021-05-01 | Stop reason: SDUPTHER

## 2021-05-01 RX ADMIN — POLYETHYLENE GLYCOL 3350 17 G: 17 POWDER, FOR SOLUTION ORAL at 20:22

## 2021-05-01 RX ADMIN — ACETAMINOPHEN 650 MG: 325 TABLET ORAL at 18:15

## 2021-05-01 RX ADMIN — SODIUM CHLORIDE, PRESERVATIVE FREE 10 ML: 5 INJECTION INTRAVENOUS at 20:21

## 2021-05-01 RX ADMIN — ENOXAPARIN SODIUM 30 MG: 30 INJECTION SUBCUTANEOUS at 20:21

## 2021-05-01 RX ADMIN — AMLODIPINE BESYLATE 5 MG: 5 TABLET ORAL at 20:21

## 2021-05-01 RX ADMIN — DEXAMETHASONE 6 MG: 4 TABLET ORAL at 20:21

## 2021-05-01 RX ADMIN — HYDROCHLOROTHIAZIDE 12.5 MG: 25 TABLET ORAL at 20:21

## 2021-05-01 RX ADMIN — SODIUM CHLORIDE 1000 ML: 9 INJECTION, SOLUTION INTRAVENOUS at 12:17

## 2021-05-01 ASSESSMENT — ENCOUNTER SYMPTOMS
PHOTOPHOBIA: 0
WHEEZING: 0
RHINORRHEA: 0
BACK PAIN: 0
ABDOMINAL PAIN: 0
NAUSEA: 0
TROUBLE SWALLOWING: 0
CONSTIPATION: 0
CHEST TIGHTNESS: 0
ABDOMINAL DISTENTION: 0
VOMITING: 0
SHORTNESS OF BREATH: 0
DIARRHEA: 0
SORE THROAT: 0

## 2021-05-01 ASSESSMENT — PAIN SCALES - GENERAL: PAINLEVEL_OUTOF10: 3

## 2021-05-01 NOTE — ED NOTES
Pt presents to the ED via [facility] with c/o of numbness and fatigue. Pt is normally alert and ambulatory with no assistive devices. Pt states she had a sinus infection last week and obtained a prescription for biaxin from her PCP. Pt states she took 5/10 course and stopped d/t side effects. Pt had previous reactions to other antibiotics with fatigue, photo-sensitivtiy, and nausea and vomiting. Pt states she has not been able to get off the couch and has increased urinary incontinence. Pt also c/o of some numbness to the right leg and swelling. Pt states the numbness has been intermittent since her previous back surgery. Pt's daughter states that pt was unable to stand she was so fatigued and had strawberry pink dry tongue. Pt denies being around anyone sick. She states she and her \"80 year old  stay home\" and are \"very adamant about cleaning and staying away from everybody. \"  Pt denies pain, placed on full cardiac monitor.     Call Light Given, White board updated          Tahmina Cruz RN  05/01/21 8138

## 2021-05-01 NOTE — ED NOTES
Bed: 12  Expected date:   Expected time:   Means of arrival:   Comments:  [address] Turner, RN  05/01/21 1869

## 2021-05-01 NOTE — PROGRESS NOTES
Pharmacy Note     Renal Dose Adjustment    Roscoe Cooper is a 80 y.o. female. Pharmacist assessment of renally cleared medications. Recent Labs     05/01/21  1148   BUN 22       Recent Labs     05/01/21  1148   CREATININE 1.28*       Estimated Creatinine Clearance: 29 mL/min (A) (based on SCr of 1.28 mg/dL (H)). Estimated CrCl using Ideal Body Weight:  27 mL/min (based on IBW 55 kg)    Height:   Ht Readings from Last 1 Encounters:   05/01/21 5' 4\" (1.626 m)     Weight:  Wt Readings from Last 1 Encounters:   05/01/21 149 lb 14.6 oz (68 kg)     The following medication dose has been adjusted based upon renal function per P&T Guidelines:             Lovenox 30 mg SC BID adjusted to 30 mg once daily     Ilene Moreno, Pharm. D.   PGY-1 Pharmacy Practice Resident   5/1/2021 5:25 PM

## 2021-05-01 NOTE — ED PROVIDER NOTES
least one if not all key elements of the E/M (history, physical exam, and MDM). Additional findings are as noted. For APC cases I have personally evaluated and examined the patient in conjunction with the APC and agree with the treatment plan and disposition of the patient as recorded by the APC.     Leonard Gonzalez MD  Attending Emergency  Physician       Lam Gtz MD  05/01/21 8122

## 2021-05-01 NOTE — ED NOTES
Labeled blood specimens sent to lab via tube system.     [x] Lavender   [] on ice   [x] Blue   [x] Green/yellow  [x] Green/black [] on ice  [] Pink  [] Red  [] Yellow  [] Blood Cultures      Isabella Vargas RN  05/01/21 2524

## 2021-05-01 NOTE — CONSULTS
[facility] - Initial Consult Note COVID 19 Patient  Today's Date and Time: 5/1/2021, 5:42 PM    Impression :   · COVID 19 Suspect  · COVID 19 Confirmed Infection  · Covid tests:  · 5/1/2021 positive  · Hyponatremia  Recommendations:   · Monitor off antibiotics  · Clinical Research will approach patient to explore if he qualifies for any of the COVID 19 treatment protocols. · Remdesivir Not indicated at this stage  · Decadron started 5/1/2021  · CRP    Medical Decision Making/Summary/Discussion:5/1/2021     · Patient admitted with suspected COVID 19 infection  · Covid test confirmed positive. Infection Control Recommendations   · Universal Precautions  · Airborne isolation  · Droplet Isolation    Antimicrobial Stewardship Recommendations     · Discontinuation of therapy  Coordination of Outpatient Care:   · Estimated Length of IV antimicrobials:TBD  · Patient will need Midline Catheter Insertion: TBD  · Patient will need PICC line Insertion: No  · Patient will need: Home IV , [place],  SNF,  LTAC:TBD  · Patient will need outpatient wound care:No    Chief complaint/reason for consultation:   · Concern for COVID infection      History of Present Illness:   Med Mendez is a 80y.o.-year-old  female who was initially admitted on 5/1/2021. Patient seen at the request of Melly Rubio. INITIAL HISTORY:    Patient presented through ER with complaints of having diffuse weakness particularly of the lower extremities to the point that she could not get up, fatigue, fevers, dry cough. He reported a prior sinus infection about a week prior to her admission. She also reported some urinary incontinence with dribbling. Her evaluation in the emergency room did not show any pulmonary involvement by the chest x-ray.   However her COVID-19 test was positive on 5/1/2021 and she was found to be hyponatremic    Patient admitted because of concerns with COVID 19.    CURRENT EVALUATION : 5/1/2021    Afebrile  VS stable, HTN    Patient exhibiting respiratory distress. Yes  Respiratory secretions: None    Patient receiving supplemental oxygen. 2 L nasal cannula   02 sat 94-->99  RR 25      % FIO2:   PEEP:      QTc:       NEWS Score: 0-4 Low risk group; 5-6: Medium risk group; 7 or above: High risk group  Parameters 3 2 1 0 1 2 3   Age    < 65   ? 65   RR ? 8  9-11 12-20  21-24 ? 25   O2 Sats ? 91 92-93 94-95 ? 96      Suppl O2  Yes  No      SBP ? 90  101-110 111-219   ? 220   HR ? 40  41-50 51-90  111-130 ? 131   Consciousness    Alert   Drowsiness, lethargy, or confusion   Temperature ? 35.0 C (95.0 F)  35.1-36.0 C 95.1-96.9 F 36.1-38.0 C 97.0-100.4 F 38.1-39.0 C 100.5-102.3 F ? 39.1 C ? 102.4 F      NEWS Score:   5/1/2021: 11 high risk    Overall Daily Picture:      Worsening    Presence of secondary bacterial Infection:  No   Additional antibiotics: No    Labs, X rays reviewed: 5/1/2021    BUN: 22  Cr: 1.28  Sodium 129    WBC: 9.5  Hb: 10.4  Plat: 177    Absolute Neutrophils: 7.1  Absolute Lymphocytes: 1.14  Neutrophil/Lymphocyte Ratio: 6.22 high risk    CRP:  Ferritin:  LDH:     Pro Calcitonin:  Troponin high-sensitivity 21      Cultures:  Urine:  ·   Blood:  · 5/1/2021 pending  Sputum :  ·   Wound:       CXR:   · 5/1/2021 no pulmonary involvement by chest x-ray  CAT:      Discussed with patient, RN, CC, IM. I have personally reviewed the past medical history, past surgical history, medications, social history, and family history, and I have updated the database accordingly.   Past Medical History:     Past Medical History:   Diagnosis Date    Anemia     Anxiety     anxiety, depression    Depression     Fibromyalgia     Gastritis     GERD (gastroesophageal reflux disease)     Hyperlipidemia     Hypertension     Lumbar disc disease     LOW BACK PAIN INTERMITTENT/STATES LARGE BULGE    Neuropathy     left leg numbness    Numbness     LEFT LEG/KNEE TO ANKLE/WEAK LEG-USES WALKER     Osteoarthritis     Watermelon stomach 02/2016    Wears glasses        Past Surgical  History:     Past Surgical History:   Procedure Laterality Date    BACK SURGERY  6/17/13    micro [id number]    CATARACT REMOVAL      wesly cataract    CHOLECYSTECTOMY      HEMORRHOID SURGERY      NASAL ENDOSCOPY      [place] ESOPHAGOGASTRODUODENOSCOPY TRANSORAL DIAGNOSTIC N/A 5/9/2017    EGD ESOPHAGOGASTRODUODENOSCOPY WITH APC performed by Nas Cooper MD at [facility]    UPPER GASTROINTESTINAL ENDOSCOPY         Medications:      sodium chloride flush  5-40 mL Intravenous 2 times per day    enoxaparin  30 mg Subcutaneous Daily    dexamethasone  6 mg Oral Daily       Social History:     Social History     Socioeconomic History    Marital status:      Spouse name: Not on file    Number of children: Not on file    Years of education: Not on file    Highest education level: Not on file   Occupational History    Not on file   Social Needs    Financial resource strain: Not on file    Food insecurity     Worry: Not on file     Inability: Not on file    Transportation needs     Medical: Not on file     Non-medical: Not on file   Tobacco Use    Smoking status: Former Smoker    Smokeless tobacco: Never Used    Tobacco comment: QUIT OVER 50 YRS AGO  6/17/13  Denies changes   Substance and Sexual Activity    Alcohol use: Yes     Comment: RARE DRINK    Drug use: No    Sexual activity: Not on file   Lifestyle    Physical activity     Days per week: Not on file     Minutes per session: Not on file    Stress: Not on file   Relationships    Social connections     Talks on phone: Not on file     Gets together: Not on file     Attends Congregational service: Not on file     Active member of club or organization: Not on file     Attends meetings of clubs or organizations: Not on file     Relationship status: Not on file    Intimate partner violence     Fear of current or ex partner: Not on file     Emotionally abused: Not on file     Physically abused: Not on file     Forced sexual activity: Not on file   Other Topics Concern    Not on file   Social History Narrative    Not on file       Family History:     Family History   Problem Relation Age of Onset    Kidney Disease Sister     Stroke Other     Diabetes Other         Allergies:   Augmentin [amoxicillin-pot clavulanate], Biaxin [clarithromycin], Amoxicillin, and Ciprofloxacin     Review of Systems:       Constitutional: No fevers or chills. No systemic complaints  Head: No headaches  Eyes: No double vision or blurry vision. No conjunctival inflammation. ENT: No sore throat or runny nose. . No hearing loss, tinnitus or vertigo. Cardiovascular: No chest pain or palpitations. No Shortness of breath. No CALZADA  Lung: No Shortness of breath or cough. No sputum production  Abdomen: No nausea, vomiting, diarrhea, or abdominal pain. Whit Antonio No cramps. Genitourinary: No increased urinary frequency, or dysuria. No hematuria. No suprapubic or CVA pain  Musculoskeletal: No muscle aches or pains. No joint effusions, swelling or deformities  Hematologic: No bleeding or bruising. Neurologic: No headache, weakness, numbness, or tingling. Integument: No rash, no ulcers. Psychiatric: No depression. Endocrine: No polyuria, no polydipsia, no polyphagia.     Physical Examination :     Patient Vitals for the past 8 hrs:   BP Temp Temp src Pulse Resp SpO2 Height Weight   05/01/21 1652 (!) 162/60 102.5 °F (39.2 °C) Oral 103 25 99 % 5' 4\" (1.626 m) 149 lb 14.6 oz (68 kg)   05/01/21 1616 (!) 164/67   101 (!) 33 96 %     05/01/21 1601 (!) 170/66   102 29 95 %     05/01/21 1545 (!) 174/67   104 30 96 %     05/01/21 1530 (!) 170/70   101 28 97 %     05/01/21 1516 (!) 165/68   100 24 96 %     05/01/21 1512 (!) 164/68   101 24 97 %     05/01/21 1446 (!) 163/86   97 (!) 32 94 %     05/01/21 1432 (!) 152/66   98 23 98 %     05/01/21 1417 (!) results for input(s): HIV in the last 72 hours. No results for input(s): BC in the last 72 hours. Lab Results   Component Value Date    MUCUS NOT REPORTED 05/01/2021    RBC 3.44 05/01/2021    TRICHOMONAS NOT REPORTED 05/01/2021    WBC 9.5 05/01/2021    YEAST NOT REPORTED 05/01/2021    TURBIDITY CLEAR 05/01/2021     Lab Results   Component Value Date    CREATININE 1.28 05/01/2021    GLUCOSE 140 05/01/2021       Medical Decision Making-Imaging:     EXAMINATION:   ONE XRAY VIEW OF THE CHEST       5/1/2021 12:02 pm       COMPARISON:   Chest radiograph performed 09/22/2020.       HISTORY:   ORDERING SYSTEM PROVIDED HISTORY: concern for tachycardia, fatigue   TECHNOLOGIST PROVIDED HISTORY:   concern for tachycardia, fatigue       FINDINGS:   There is no acute consolidation or effusion.  There is no pneumothorax.  The   mediastinal structures are unremarkable.  The upper abdomen is unremarkable. The extrathoracic soft tissues are unremarkable. Alycia Mettle is no acute osseous   abnormality.           Impression   No acute cardiopulmonary process. EXAMINATION:   CT OF THE HEAD WITHOUT CONTRAST  5/1/2021 2:00 pm       TECHNIQUE:   CT of the head was performed without the administration of intravenous   contrast. Dose modulation, iterative reconstruction, and/or weight based   adjustment of the mA/kV was utilized to reduce the radiation dose to as low   as reasonably achievable.       COMPARISON:   January 6, 2020.       HISTORY:   ORDERING SYSTEM PROVIDED HISTORY: concern for fall off of toilet a couple of   weeks ago   TECHNOLOGIST PROVIDED HISTORY:       concern for fall off of toilet a couple of weeks ago   Decision Support Exception - unselect if not a suspected or confirmed   emergency medical condition->Emergency Medical Condition (MA)       FINDINGS:   BRAIN/VENTRICLES: The gyri and sulci have a normal appearance. There is mild   cortical and cerebellar volume loss with associated ex vacuo ventricular   dilation.

## 2021-05-01 NOTE — ED NOTES
Labeled blood specimens sent to lab via tube system.     [] Lavender   [] on ice   [] Blue   [] Green/yellow  [] Green/black [] on ice  [] Pink  [] Red  [] Yellow  [x] Blood Cultures      Bárbara Stack RN  05/01/21 8065

## 2021-05-01 NOTE — ED NOTES
The following labs labeled with pt sticker and tubed:     [] Lavender   [] on ice   [] Blue   [] Green/yellow  [x] Green/black [x] on ice  [] Pink  [] Red  [] Yellow     [] COVID-19 swab    [] Rapid     [] Urine Sample  [] Pelvic Cultures    [x] Blood Cultures            Norma Yin RN  05/01/21 1203

## 2021-05-01 NOTE — PROGRESS NOTES
RAPID Covid 19 swab taken from right nare, labeled, placed in red dot bag, and handed off to second healthcare worker outside of room for transport to laboratory per hospital policy and procedure. Patient tolerated procedure well        Swab sent to Lab via tube system.

## 2021-05-01 NOTE — H&P
[facility]  Office: Lino Velasco, DO, Jose Luis Stewart, DO, Juan Mckinney, DO, Shawna Velarde Blood, DO, Gabby Lindsay MD, Franco Mills MD, Anders Hodges MD, Long Bennett MD, Rosa Braxton MD, Abbie Palacios MD, Selene Hoffman MD, Babar Moran MD, Lorraine Ashley, DO, Jono Barnes MD, Ayana Cobb, DO, Felisha Becerra MD,  Zonia Mora, DO, Adis Morris MD, Aung Richardson MD, Kati Coe MD, Tara Wilkinson MD, Jin Lemon, [facility], [facility], CNP, Noble Charles, CNP, Catherine Nieto, CNS, Antonino Clark, CNP, Nisa Palencia, CNP, Maxine Adams, CNP, Alyson Odonnell, CNP, Tez Evans, CNP, Michael Philippe PA-C, Valerie Krishnamurthy, [facility], Giovanny Evans, CNP, Valentino, CNP, Evita Cisneros, CNP, Jazmin Novoa, CNP, Terell Tong, CNP, Tyrell Hess, [facility]   [address] [facility]    HISTORY AND PHYSICAL EXAMINATION            Date:   5/1/2021  Patient name:  Amalia Keen  Date of admission:  5/1/2021 11:39 AM  MRN:   [id number]  Account:  [de-identified]  YOB: 1934  PCP:    Alejandro Rodney MD  Room:   [facility]3007-  Code Status:    Prior    Chief Complaint:     Chief Complaint   Patient presents with    Fatigue    Chills    Extremity Weakness     bilateral legs    Fever     100.4 oral       History Obtained From:     patient, electronic medical record    History of Present Illness: This is a 59-year-old female with past medical history of anxiety, depression, fibromyalgia, GERD, hypertension, hyperlipidemia is coming in with complains of fatigue, lower extremity weakness, fever, cough. Patient reported that she had sinus infection about a week ago for which she had some prescription called in. Patient also complains of having trouble with urination. Patient complained of being weak to the point where she has been having trouble getting off her couch.   Patient also complained of increased urinary incontinence. Patient complains of right leg numbness and swelling. In the emergency department patient was noted to be febrile 100.4 tachypneic with highest respiratory rate being 33 and hypertensive with blood pressure being elevated at 163/86. Past Medical History:     Past Medical History:   Diagnosis Date    Anemia     Anxiety     anxiety, depression    Depression     Fibromyalgia     Gastritis     GERD (gastroesophageal reflux disease)     Hyperlipidemia     Hypertension     Lumbar disc disease     LOW BACK PAIN INTERMITTENT/STATES LARGE BULGE    Neuropathy     left leg numbness    Numbness     LEFT LEG/KNEE TO ANKLE/WEAK LEG-USES WALKER     Osteoarthritis     Watermelon stomach 02/2016    Wears glasses         Past Surgical History:     Past Surgical History:   Procedure Laterality Date    BACK SURGERY  6/17/13    micro [id number]    CATARACT REMOVAL      wesly cataract    CHOLECYSTECTOMY      HEMORRHOID SURGERY      NASAL ENDOSCOPY      FL ESOPHAGOGASTRODUODENOSCOPY TRANSORAL DIAGNOSTIC N/A 5/9/2017    EGD ESOPHAGOGASTRODUODENOSCOPY WITH APC performed by Luis Fernando Mishra MD at [facility]    UPPER GASTROINTESTINAL ENDOSCOPY          Medications Prior to Admission:     Prior to Admission medications    Medication Sig Start Date End Date Taking?  Authorizing Provider   acetaminophen (TYLENOL) 500 MG tablet Take 500 mg by mouth 3 times daily   Yes Historical Provider, MD   ondansetron (ZOFRAN ODT) 4 MG disintegrating tablet Take 1 tablet by mouth every 8 hours as needed for Nausea 11/26/20  Yes Maura Ray MD   loratadine (CLARITIN) 10 MG tablet Take 1 tablet by mouth daily 11/5/19  Yes GUERRERO Yeh CNP   fluticasone [facility]) 50 MCG/ACT nasal spray 2 sprays by Nasal route daily 11/5/19  Yes GUERRERO Yeh CNP   nortriptyline (PAMELOR) 25 MG capsule Take 10 mg by mouth daily    Yes Historical Provider, MD   amLODIPine (NORVASC) 10 MG tablet Take 5 mg by mouth daily    Yes Historical Provider, MD   hydrochlorothiazide (HYDRODIURIL) 25 MG tablet Take 1 tablet by mouth daily. 3/6/14  Yes Kelsie Davison,    NONFORMULARY CBD oil prn    Historical Provider, MD   potassium chloride (KLOR-CON M) 10 MEQ extended release tablet Take 1 tablet by mouth daily for 15 days 6/15/19 6/30/19  Loyd Pallas, MD   ALPRAZolam Cecy Roberts) 0.5 MG tablet Take 0.25 mg by mouth nightly as needed for Sleep. Take 1/2 of 0.5mg tablet    Historical Provider, MD        Allergies:     Augmentin [amoxicillin-pot clavulanate], Biaxin [clarithromycin], Amoxicillin, and Ciprofloxacin    Social History:     Tobacco:    reports that she has quit smoking. She has never used smokeless tobacco.  Alcohol:      reports current alcohol use. Drug Use:  reports no history of drug use. Family History:     Family History   Problem Relation Age of Onset    Kidney Disease Sister     Stroke Other     Diabetes Other        Review of Systems:     Positive and Negative as described in HPI.     CONSTITUTIONAL:  negative for fevers, chills, sweats, fatigue, weight loss  HEENT:  negative for vision, hearing changes, runny nose, throat pain  RESPIRATORY:  negative for shortness of breath, cough, congestion, wheezing  CARDIOVASCULAR:  negative for chest pain, palpitations  GASTROINTESTINAL:  negative for nausea, vomiting, diarrhea, constipation, change in bowel habits, abdominal pain   GENITOURINARY:  negative for difficulty of urination, burning with urination, frequency   INTEGUMENT:  negative for rash, skin lesions, easy bruising   HEMATOLOGIC/LYMPHATIC:  negative for swelling/edema   ALLERGIC/IMMUNOLOGIC:  negative for urticaria , itching  ENDOCRINE:  negative increase in drinking, increase in urination, hot or cold intolerance  MUSCULOSKELETAL:  negative joint pains, muscle aches, swelling of joints  NEUROLOGICAL:  negative for headaches, dizziness, lightheadedness, numbness, pain, tingling extremities BEHAVIOR/PSYCH:  negative for depression, anxiety    Physical Exam:   BP (!) 162/60   Pulse 103   Temp 100.4 °F (38 °C) (Oral)   Resp 25   Ht 5' 4\" (1.626 m)   Wt 150 lb (68 kg)   SpO2 99%   BMI 25.75 kg/m²   Temp (24hrs), Av.4 °F (38 °C), Min:100.4 °F (38 °C), Max:100.4 °F (38 °C)    No results for input(s): POCGLU in the last 72 hours.   No intake or output data in the 24 hours ending 21 1702    General Appearance: alert, well appearing, and in no acute distress  Mental status: oriented to person, place, and time  Head: normocephalic, atraumatic  Eye: no icterus, redness, pupils equal and reactive, extraocular eye movements intact, conjunctiva clear  Ear: normal external ear, no discharge, hearing intact  Nose: no drainage noted  Mouth: mucous membranes moist  Neck: supple, no carotid bruits, thyroid not palpable  Lungs: Bilateral equal air entry, clear to ausculation, no wheezing, rales or rhonchi, normal effort  Cardiovascular: normal rate, regular rhythm, no murmur, gallop, rub  Abdomen: Soft, nontender, nondistended, normal bowel sounds, no hepatomegaly or splenomegaly  Neurologic: There are no new focal motor or sensory deficits, normal muscle tone and bulk, no abnormal sensation, normal speech, cranial nerves II through XII grossly intact  Skin: No gross lesions, rashes, bruising or bleeding on exposed skin area  Extremities: peripheral pulses palpable, no pedal edema or calf pain with palpation  Psych: normal affect    Investigations:      Laboratory Testing:  Recent Results (from the past 24 hour(s))   CBC WITH AUTO DIFFERENTIAL    Collection Time: 21 11:48 AM   Result Value Ref Range    WBC 9.5 3.5 - 11.3 k/uL    RBC 3.44 (L) 3.95 - 5.11 m/uL    Hemoglobin 10.4 (L) 11.9 - 15.1 g/dL    Hematocrit 32.4 (L) 36.3 - 47.1 %    MCV 94.2 82.6 - 102.9 fL    MCH 30.2 25.2 - 33.5 pg    MCHC 32.1 28.4 - 34.8 g/dL    RDW 14.1 11.8 - 14.4 %    Platelets 427 114 - 729 k/uL    MPV 9.6 8.1 - 13.5 fL NRBC Automated 0.0 0.0 per 100 WBC    Differential Type NOT REPORTED     WBC Morphology NOT REPORTED     RBC Morphology NOT REPORTED     Platelet Estimate NOT REPORTED     Immature Granulocytes 0 0 %    Seg Neutrophils 75 (H) 36 - 66 %    Lymphocytes 12 (L) 24 - 44 %    Monocytes 12 (H) 1 - 7 %    Eosinophils % 0 (L) 1 - 4 %    Basophils 1 0 - 2 %    Absolute Immature Granulocyte 0.00 0.00 - 0.30 k/uL    Segs Absolute 7.12 1.8 - 7.7 k/uL    Absolute Lymph # 1.14 1.0 - 4.8 k/uL    Absolute Mono # 1.14 (H) 0.1 - 0.8 k/uL    Absolute Eos # 0.00 0.0 - 0.4 k/uL    Basophils Absolute 0.10 0.0 - 0.2 k/uL    Morphology Normal    COMPREHENSIVE METABOLIC PANEL    Collection Time: 05/01/21 11:48 AM   Result Value Ref Range    Glucose 140 (H) 70 - 99 mg/dL    BUN 22 8 - 23 mg/dL    CREATININE 1.28 (H) 0.50 - 0.90 mg/dL    Bun/Cre Ratio NOT REPORTED 9 - 20    Calcium 8.8 8.6 - 10.4 mg/dL    Sodium 129 (L) 135 - 144 mmol/L    Potassium 3.7 3.7 - 5.3 mmol/L    Chloride 90 (L) 98 - 107 mmol/L    CO2 26 20 - 31 mmol/L    Anion Gap 13 9 - 17 mmol/L    Alkaline Phosphatase 64 35 - 104 U/L    ALT 13 5 - 33 U/L    AST 30 <32 U/L    Total Bilirubin 0.31 0.3 - 1.2 mg/dL    Total Protein 7.2 6.4 - 8.3 g/dL    Albumin 3.6 3.5 - 5.2 g/dL    Albumin/Globulin Ratio 1.0 1.0 - 2.5    GFR Non- 39 (L) >60 mL/min    GFR  48 (L) >60 mL/min    GFR Comment          GFR Staging NOT REPORTED    Troponin    Collection Time: 05/01/21 11:48 AM   Result Value Ref Range    Troponin, High Sensitivity 21 (H) 0 - 14 ng/L    Troponin T NOT REPORTED <0.03 ng/mL    Troponin Interp NOT REPORTED    TSH with Reflex    Collection Time: 05/01/21 11:48 AM   Result Value Ref Range    TSH 1.03 0.30 - 5.00 mIU/L   Protime-INR    Collection Time: 05/01/21 11:48 AM   Result Value Ref Range    Protime 10.6 9.1 - 12.3 sec    INR 1.0    Culture, Blood 1    Collection Time: 05/01/21 12:40 PM    Specimen: Blood   Result Value Ref Range Specimen Description . BLOOD     Special Requests 10 RAC     Culture NO GROWTH 1 HOUR    COVID-19, Rapid    Collection Time: 05/01/21 12:46 PM    Specimen: Nasopharyngeal Swab   Result Value Ref Range    Specimen Description . NASOPHARYNGEAL SWAB     SARS-CoV-2, Rapid DETECTED (A) Not Detected   Lactate, Sepsis    Collection Time: 05/01/21 12:53 PM   Result Value Ref Range    Lactic Acid, Sepsis NOT REPORTED 0.5 - 1.9 mmol/L    Lactic Acid, Sepsis, Whole Blood 0.6 0.5 - 1.9 mmol/L   Urinalysis Reflex to Culture    Collection Time: 05/01/21  1:31 PM    Specimen: Urine, clean catch   Result Value Ref Range    Color, UA YELLOW YELLOW    Turbidity UA CLEAR CLEAR    Glucose, Ur NEGATIVE NEGATIVE    Bilirubin Urine NEGATIVE NEGATIVE    Ketones, Urine NEGATIVE NEGATIVE    Specific Gravity, UA 1.011 1.005 - 1.030    Urine Hgb TRACE (A) NEGATIVE    pH, UA 6.0 5.0 - 8.0    Protein, UA 2+ (A) NEGATIVE    Urobilinogen, Urine Normal Normal    Nitrite, Urine NEGATIVE NEGATIVE    Leukocyte Esterase, Urine NEGATIVE NEGATIVE    Urinalysis Comments NOT REPORTED    Microscopic Urinalysis    Collection Time: 05/01/21  1:31 PM   Result Value Ref Range    -          WBC, UA None 0 - 5 /HPF    RBC, UA 2 TO 5 0 - 4 /HPF    Casts UA  0 - 8 /LPF     0 TO 2 HYALINE Reference range defined for non-centrifuged specimen. Crystals, UA NOT REPORTED None /HPF    Epithelial Cells UA 0 TO 2 0 - 5 /HPF    Renal Epithelial, UA NOT REPORTED 0 /HPF    Bacteria, UA NOT REPORTED None    Mucus, UA NOT REPORTED None    Trichomonas, UA NOT REPORTED None    Amorphous, UA NOT REPORTED None    Other Observations UA NOT REPORTED NOT REQ. Yeast, UA NOT REPORTED None       Imaging/Diagnostics:  Ct Head Wo Contrast    Result Date: 5/1/2021  No evidence of acute intracranial process. Xr Chest Portable    Result Date: 5/1/2021  No acute cardiopulmonary process.        Assessment :      Hospital Problems           Last Modified POA    COVID-19 5/1/2021 Yes Plan:     Patient status inpatient in the Progressive Unit/Step down    COVID-19 infection  -ID consulted  -Started patient on Decadron    Hyponatremia  -Monitor with oral fluids    Anemia  -Hemoglobin mildly low at 10.4  -Monitor    Generalized weakness  -Started Decadron  -PT OT    Consultations:   IP CONSULT TO HOSPITALIST  IP CONSULT TO INFECTIOUS DISEASES     Patient is admitted as inpatient status because of co-morbidities listed above, severity of signs and symptoms as outlined, requirement for current medical therapies and most importantly because of direct risk to patient if care not provided in a hospital setting. Expected length of stay > 48 hours.     Pilar Carpio MD  5/1/2021  5:02 PM    Copy sent to Dr. Sapna Dean MD

## 2021-05-01 NOTE — ED PROVIDER NOTES
[facility] ED  Emergency Department Encounter  Emergency Medicine Resident     Pt Name: Kristopher Villasenor  MRN: [id number]  Breagfmadonna 1934  Date of evaluation: 5/1/21  PCP:  Tino Dowling MD    CHIEF COMPLAINT       Chief Complaint   Patient presents with    Fatigue    Chills    Extremity Weakness     bilateral legs    Fever     100.4 oral       HISTORY OFPRESENT ILLNESS  (Location/Symptom, Timing/Onset, Context/Setting, Quality, Duration, Modifying Stephanie English.)      Kristopher Villasenor is a 80 y.o. female who presents with concerns for generalized fatigue as well as weakness of the bilateral lower extremity. Patient does state that she is at increased urinary frequency over the past 4 weeks, states that she has felt more tired than usual for the past 5 to 6 days. Patient denies focal neurological deficit, fever, chills, nausea, vomiting, does state that she had a sinus infection for which she was treated with clarithromycin a couple of weeks ago. Patient states that she has not been ambulatory as usual, states that she has felt too tired to get up and walk around, states that she has been a lot of her time sitting in a chair, lying in bed. Patient does endorse chills, unsure about fever as she has not been checking her temperature at home. As per patient's daughter, patient did suffer a fall from seated position from a toilet, is unsure if she struck her head but does state that she does appear to have a small abrasion on the top of her head a couple of days ago. Patient has also decreased appetite over these past couple of days. Patient denies overt abdominal pain, denies change in bowel function including diarrhea, constipation, does endorse urinary frequency. Patient denies loss of smell or taste, is unsure about COVID-19 contacts.     PAST MEDICAL / SURGICAL / SOCIAL / FAMILY HISTORY      has a past medical history of Anemia, Anxiety, Depression, Fibromyalgia, Gastritis, GERD (gastroesophageal reflux disease), Hyperlipidemia, Hypertension, Lumbar disc disease, Neuropathy, Numbness, Osteoarthritis, Watermelon stomach, and Wears glasses. has a past surgical history that includes Cholecystectomy; nasal endoscopy; Hemorrhoid surgery; back surgery (6/17/13); Cataract removal; Upper gastrointestinal endoscopy; and pr esophagogastroduodenoscopy transoral diagnostic (N/A, 5/9/2017).      Social History     Socioeconomic History    Marital status:      Spouse name: Not on file    Number of children: Not on file    Years of education: Not on file    Highest education level: Not on file   Occupational History    Not on file   Social Needs    Financial resource strain: Not on file    Food insecurity     Worry: Not on file     Inability: Not on file    Transportation needs     Medical: Not on file     Non-medical: Not on file   Tobacco Use    Smoking status: Former Smoker    Smokeless tobacco: Never Used    Tobacco comment: QUIT OVER 48 YRS AGO  6/17/13  Denies changes   Substance and Sexual Activity    Alcohol use: Yes     Comment: RARE DRINK    Drug use: No    Sexual activity: Not on file   Lifestyle    Physical activity     Days per week: Not on file     Minutes per session: Not on file    Stress: Not on file   Relationships    Social connections     Talks on phone: Not on file     Gets together: Not on file     Attends Sabianist service: Not on file     Active member of club or organization: Not on file     Attends meetings of clubs or organizations: Not on file     Relationship status: Not on file    Intimate partner violence     Fear of current or ex partner: Not on file     Emotionally abused: Not on file     Physically abused: Not on file     Forced sexual activity: Not on file   Other Topics Concern    Not on file   Social History Narrative    Not on file       Family History   Problem Relation Age of Onset    Kidney Disease Sister     Stroke Other     Diabetes Other        Allergies:  Augmentin [amoxicillin-pot clavulanate], Biaxin [clarithromycin], Amoxicillin, and Ciprofloxacin    Home Medications:  Prior to Admission medications    Medication Sig Start Date End Date Taking? Authorizing Provider   acetaminophen (TYLENOL) 500 MG tablet Take 500 mg by mouth 3 times daily   Yes Historical Provider, MD   ondansetron (ZOFRAN ODT) 4 MG disintegrating tablet Take 1 tablet by mouth every 8 hours as needed for Nausea 11/26/20  Yes Nichole Adorno MD   loratadine (CLARITIN) 10 MG tablet Take 1 tablet by mouth daily 11/5/19  Yes GUERRERO Chapman CNP   fluticasone Mittie Running) 50 MCG/ACT nasal spray 2 sprays by Nasal route daily 11/5/19  Yes GUERRERO Chapman CNP   nortriptyline (PAMELOR) 25 MG capsule Take 10 mg by mouth daily    Yes Historical Provider, MD   amLODIPine (NORVASC) 10 MG tablet Take 5 mg by mouth daily    Yes Historical Provider, MD   hydrochlorothiazide (HYDRODIURIL) 25 MG tablet Take 1 tablet by mouth daily. 3/6/14  Yes Jorge Hand,    NONFORMULARY CBD oil prn    Historical Provider, MD   potassium chloride (KLOR-CON M) 10 MEQ extended release tablet Take 1 tablet by mouth daily for 15 days 6/15/19 6/30/19  Skyler Hackett MD   ALPRAZolam [facility]) 0.5 MG tablet Take 0.25 mg by mouth nightly as needed for Sleep. Take 1/2 of 0.5mg tablet    Historical Provider, MD       REVIEW OFSYSTEMS    (2-9 systems for level 4, 10 or more for level 5)      Review of Systems   Constitutional: Positive for appetite change, chills and fatigue. Negative for fever. HENT: Negative for hearing loss, rhinorrhea, sneezing, sore throat, tinnitus and trouble swallowing. Eyes: Negative for photophobia and visual disturbance. Respiratory: Negative for chest tightness, shortness of breath and wheezing. Cardiovascular: Negative for chest pain and palpitations.    Gastrointestinal: Negative for abdominal distention, abdominal pain, constipation, diarrhea, nausea and vomiting. Endocrine: Negative for polyuria. Genitourinary: Positive for frequency. Negative for dysuria, flank pain, vaginal bleeding and vaginal discharge. Musculoskeletal: Negative for arthralgias, back pain, gait problem and neck pain. Neurological: Negative for dizziness, tremors, seizures, syncope, facial asymmetry, numbness and headaches. Psychiatric/Behavioral: Negative for self-injury and suicidal ideas. PHYSICAL EXAM   (up to 7 for level 4, 8 or more forlevel 5)      INITIAL VITALS:   ED Triage Vitals   BP Temp Temp src Pulse Resp SpO2 Height Weight   -- -- -- -- -- -- -- --       Physical Exam  Constitutional:       Appearance: She is not ill-appearing or diaphoretic. HENT:      Head: Normocephalic and atraumatic. Right Ear: External ear normal.      Left Ear: External ear normal.      Nose: No rhinorrhea. Mouth/Throat:      Mouth: Mucous membranes are moist.   Eyes:      Extraocular Movements: Extraocular movements intact. Conjunctiva/sclera: Conjunctivae normal.   Cardiovascular:      Rate and Rhythm: Normal rate and regular rhythm. Pulmonary:      Effort: Pulmonary effort is normal. No respiratory distress. Abdominal:      General: Abdomen is flat. Musculoskeletal: Normal range of motion. General: No tenderness or signs of injury. Skin:     General: Skin is warm. Capillary Refill: Capillary refill takes less than 2 seconds. Neurological:      Mental Status: She is alert and oriented to person, place, and time. Mental status is at baseline.       Comments: 5 5 muscle strength bilateral lower extremity with dorsi, plantar flexion, patient able to hold weight up against gravity with no issue with bilateral upper and lower extremities, 5 out of 5 muscle strength overall, no tenderness to palpation of the cervical spine, no signs of facial droop, focal neurological deficit         DIFFERENTIAL  DIAGNOSIS     PLAN (LABS / IMAGING / EKG): Orders Placed This Encounter   Procedures    COVID-19, Rapid    Culture, Blood 1    Culture, Blood 1    Culture, Urine    XR CHEST PORTABLE    CT HEAD WO CONTRAST    CBC WITH AUTO DIFFERENTIAL    COMPREHENSIVE METABOLIC PANEL    Troponin    Urinalysis Reflex to Culture    TSH with Reflex    Protime-INR    Microscopic Urinalysis    Basic Metabolic Panel w/ Reflex to MG    CBC    C-REACTIVE PROTEIN    C-REACTIVE PROTEIN    DIET CARDIAC;    Vital signs per unit routine    Notify physician    Up with assistance    Daily weights    Intake and output    Place intermittent pneumatic compression device    Telemetry Monitoring    Vital signs per unit routine    PPE Instructions    Up as tolerated    DNR comfort care - arrest    Inpatient consult to Hospitalist    Inpatient consult to Infectious Diseases    Droplet Plus Isolation    OT eval and treat    PT evaluation and treat    Initiate Oxygen Therapy Protocol    Pulse Oximetry Spot Check    Initiate Oxygen Therapy Protocol    EKG 12 Lead    PATIENT STATUS (FROM ED OR OR/PROCEDURAL) Inpatient       MEDICATIONS ORDERED:  Orders Placed This Encounter   Medications    0.9 % sodium chloride bolus    DISCONTD: sodium chloride flush 0.9 % injection 5-40 mL    DISCONTD: sodium chloride flush 0.9 % injection 10 mL    DISCONTD: 0.9 % sodium chloride infusion    DISCONTD: potassium chloride (KLOR-CON M) extended release tablet 40 mEq    DISCONTD: potassium bicarb-citric acid (EFFER-K) effervescent tablet 40 mEq    DISCONTD: potassium chloride 10 mEq/100 mL IVPB (Peripheral Line)    DISCONTD: magnesium sulfate 1000 mg in dextrose 5% 100 mL IVPB    DISCONTD: promethazine (PHENERGAN) tablet 12.5 mg    DISCONTD: ondansetron (ZOFRAN) injection 4 mg    DISCONTD: polyethylene glycol (GLYCOLAX) packet 17 g    nicotine (NICODERM CQ) 21 MG/24HR 1 patch    DISCONTD: acetaminophen (TYLENOL) tablet 650 mg    DISCONTD: acetaminophen (TYLENOL) at this time, spoke with patient's family who are also agreeable to admission at this time.:     DIAGNOSTIC RESULTS / EMERGENCYDEPARTMENT COURSE / MDM     LABS:  Labs Reviewed   COVID-19, RAPID - Abnormal; Notable for the following components:       Result Value    SARS-CoV-2, Rapid DETECTED (*)     All other components within normal limits   CBC WITH AUTO DIFFERENTIAL - Abnormal; Notable for the following components:    RBC 3.44 (*)     Hemoglobin 10.4 (*)     Hematocrit 32.4 (*)     Seg Neutrophils 75 (*)     Lymphocytes 12 (*)     Monocytes 12 (*)     Eosinophils % 0 (*)     Absolute Mono # 1.14 (*)     All other components within normal limits   COMPREHENSIVE METABOLIC PANEL - Abnormal; Notable for the following components:    Glucose 140 (*)     CREATININE 1.28 (*)     Sodium 129 (*)     Chloride 90 (*)     GFR Non- 39 (*)     GFR  48 (*)     All other components within normal limits   TROPONIN - Abnormal; Notable for the following components:    Troponin, High Sensitivity 21 (*)     All other components within normal limits   URINE RT REFLEX TO CULTURE - Abnormal; Notable for the following components:    Urine Hgb TRACE (*)     Protein, UA 2+ (*)     All other components within normal limits   CULTURE, BLOOD 1   CULTURE, BLOOD 1   CULTURE, URINE   TSH WITH REFLEX   LACTATE, SEPSIS   PROTIME-INR   MICROSCOPIC URINALYSIS   C-REACTIVE PROTEIN   BASIC METABOLIC PANEL W/ REFLEX TO MG FOR LOW K   CBC   C-REACTIVE PROTEIN           Ct Head Wo Contrast    Result Date: 5/1/2021  EXAMINATION: CT OF THE HEAD WITHOUT CONTRAST  5/1/2021 2:00 pm TECHNIQUE: CT of the head was performed without the administration of intravenous contrast. Dose modulation, iterative reconstruction, and/or weight based adjustment of the mA/kV was utilized to reduce the radiation dose to as low as reasonably achievable. COMPARISON: January 6, 2020.  HISTORY: ORDERING SYSTEM PROVIDED HISTORY: concern for fall off of toilet a couple of weeks ago TECHNOLOGIST PROVIDED HISTORY: concern for fall off of toilet a couple of weeks ago Decision Support Exception - unselect if not a suspected or confirmed emergency medical condition->Emergency Medical Condition (MA) FINDINGS: BRAIN/VENTRICLES: The gyri and sulci have a normal appearance. There is mild cortical and cerebellar volume loss with associated ex vacuo ventricular dilation. Mild diffuse periventricular and subcortical deep white matter hypoattenuation noted, findings compatible with chronic small vessel ischemic disease. A mild degree of encephalomalacia within the left parietal and temporal lobe regions is unchanged. The gray-white matter differentiation is otherwise preserved throughout. There is no acute hemorrhage, mass, or mass effect. No evidence of acute territorial infarct. No abnormal extraaxial fluid collections. ORBITS:  The visualized portion of the orbits demonstrate no acute abnormality. SINUSES:  The mastoid air cells are normally aerated. The visualized paranasal sinuses are grossly clear. SOFT TISSUES/SKULL:  No significant abnormality of the visualized skull or soft tissues. No acute fracture. No scalp hematoma. No evidence of acute intracranial process. Xr Chest Portable    Result Date: 5/1/2021  EXAMINATION: ONE XRAY VIEW OF THE CHEST 5/1/2021 12:02 pm COMPARISON: Chest radiograph performed 09/22/2020. HISTORY: ORDERING SYSTEM PROVIDED HISTORY: concern for tachycardia, fatigue TECHNOLOGIST PROVIDED HISTORY: concern for tachycardia, fatigue FINDINGS: There is no acute consolidation or effusion. There is no pneumothorax. The mediastinal structures are unremarkable. The upper abdomen is unremarkable. The extrathoracic soft tissues are unremarkable. There is no acute osseous abnormality. No acute cardiopulmonary process.        PROCEDURES:  None    CONSULTS:  IP CONSULT TO HOSPITALIST  IP CONSULT TO INFECTIOUS DISEASES    CRITICAL CARE: Please see attending note    FINAL IMPRESSION      1. COVID-19          DISPOSITION / [id number] Admitted 05/01/2021 03:58:04 PM      PATIENT REFERRED TO:  No follow-up provider specified.     DISCHARGE MEDICATIONS:  Current Discharge Medication List          Aleksandra Goodrich MD  Emergency Medicine Resident    (Please note that portions of this note were completed with a voice recognition program.Efforts were made to edit the dictations but occasionally words are mis-transcribed.)        Aleksandra Goodrich MD  Resident  05/01/21 5506

## 2021-05-02 PROBLEM — J06.9 UPPER RESPIRATORY TRACT INFECTION DUE TO COVID-19 VIRUS: Status: ACTIVE | Noted: 2021-05-01

## 2021-05-02 PROBLEM — M79.7 FIBROMYALGIA: Status: ACTIVE | Noted: 2021-05-02

## 2021-05-02 LAB
ANION GAP SERPL CALCULATED.3IONS-SCNC: 13 MMOL/L (ref 9–17)
BUN BLDV-MCNC: 21 MG/DL (ref 8–23)
BUN/CREAT BLD: ABNORMAL (ref 9–20)
C-REACTIVE PROTEIN: 167.2 MG/L (ref 0–5)
CALCIUM SERPL-MCNC: 8.4 MG/DL (ref 8.6–10.4)
CHLORIDE BLD-SCNC: 91 MMOL/L (ref 98–107)
CO2: 25 MMOL/L (ref 20–31)
CREAT SERPL-MCNC: 1.2 MG/DL (ref 0.5–0.9)
CULTURE: NO GROWTH
GFR AFRICAN AMERICAN: 52 ML/MIN
GFR NON-AFRICAN AMERICAN: 42 ML/MIN
GFR SERPL CREATININE-BSD FRML MDRD: ABNORMAL ML/MIN/{1.73_M2}
GFR SERPL CREATININE-BSD FRML MDRD: ABNORMAL ML/MIN/{1.73_M2}
GLUCOSE BLD-MCNC: 202 MG/DL (ref 70–99)
HCT VFR BLD CALC: 31 % (ref 36.3–47.1)
HEMOGLOBIN: 10 G/DL (ref 11.9–15.1)
Lab: NORMAL
MCH RBC QN AUTO: 30.2 PG (ref 25.2–33.5)
MCHC RBC AUTO-ENTMCNC: 32.3 G/DL (ref 28.4–34.8)
MCV RBC AUTO: 93.7 FL (ref 82.6–102.9)
NRBC AUTOMATED: 0 PER 100 WBC
PDW BLD-RTO: 13.7 % (ref 11.8–14.4)
PLATELET # BLD: 152 K/UL (ref 138–453)
PMV BLD AUTO: 10 FL (ref 8.1–13.5)
POTASSIUM SERPL-SCNC: 3.9 MMOL/L (ref 3.7–5.3)
RBC # BLD: 3.31 M/UL (ref 3.95–5.11)
SODIUM BLD-SCNC: 129 MMOL/L (ref 135–144)
SPECIMEN DESCRIPTION: NORMAL
WBC # BLD: 7 K/UL (ref 3.5–11.3)

## 2021-05-02 PROCEDURE — APPSS30 APP SPLIT SHARED TIME 16-30 MINUTES: Performed by: NURSE PRACTITIONER

## 2021-05-02 PROCEDURE — 2060000000 HC ICU INTERMEDIATE R&B

## 2021-05-02 PROCEDURE — 86140 C-REACTIVE PROTEIN: CPT

## 2021-05-02 PROCEDURE — 6360000002 HC RX W HCPCS: Performed by: INTERNAL MEDICINE

## 2021-05-02 PROCEDURE — 2580000003 HC RX 258: Performed by: NURSE PRACTITIONER

## 2021-05-02 PROCEDURE — 99232 SBSQ HOSP IP/OBS MODERATE 35: CPT | Performed by: INTERNAL MEDICINE

## 2021-05-02 PROCEDURE — 80048 BASIC METABOLIC PNL TOTAL CA: CPT

## 2021-05-02 PROCEDURE — 2580000003 HC RX 258: Performed by: INTERNAL MEDICINE

## 2021-05-02 PROCEDURE — 6370000000 HC RX 637 (ALT 250 FOR IP): Performed by: INTERNAL MEDICINE

## 2021-05-02 PROCEDURE — XW033E5 INTRODUCTION OF REMDESIVIR ANTI-INFECTIVE INTO PERIPHERAL VEIN, PERCUTANEOUS APPROACH, NEW TECHNOLOGY GROUP 5: ICD-10-PCS | Performed by: NURSE PRACTITIONER

## 2021-05-02 PROCEDURE — 36415 COLL VENOUS BLD VENIPUNCTURE: CPT

## 2021-05-02 PROCEDURE — 99233 SBSQ HOSP IP/OBS HIGH 50: CPT | Performed by: INTERNAL MEDICINE

## 2021-05-02 PROCEDURE — 6370000000 HC RX 637 (ALT 250 FOR IP): Performed by: NURSE PRACTITIONER

## 2021-05-02 PROCEDURE — 2500000003 HC RX 250 WO HCPCS: Performed by: NURSE PRACTITIONER

## 2021-05-02 PROCEDURE — 85027 COMPLETE CBC AUTOMATED: CPT

## 2021-05-02 RX ORDER — 0.9 % SODIUM CHLORIDE 0.9 %
30 INTRAVENOUS SOLUTION INTRAVENOUS PRN
Status: DISCONTINUED | OUTPATIENT
Start: 2021-05-02 | End: 2021-05-08 | Stop reason: HOSPADM

## 2021-05-02 RX ORDER — ALPRAZOLAM 0.25 MG/1
0.25 TABLET ORAL NIGHTLY PRN
Status: DISCONTINUED | OUTPATIENT
Start: 2021-05-02 | End: 2021-05-08 | Stop reason: HOSPADM

## 2021-05-02 RX ORDER — CETIRIZINE HYDROCHLORIDE 10 MG/1
10 TABLET ORAL DAILY
Refills: 0 | Status: DISCONTINUED | OUTPATIENT
Start: 2021-05-02 | End: 2021-05-08 | Stop reason: HOSPADM

## 2021-05-02 RX ORDER — FLUTICASONE PROPIONATE 50 MCG
2 SPRAY, SUSPENSION (ML) NASAL DAILY
Status: DISCONTINUED | OUTPATIENT
Start: 2021-05-02 | End: 2021-05-08 | Stop reason: HOSPADM

## 2021-05-02 RX ADMIN — HYDROCHLOROTHIAZIDE 12.5 MG: 25 TABLET ORAL at 08:09

## 2021-05-02 RX ADMIN — FLUTICASONE PROPIONATE 2 SPRAY: 50 SPRAY, METERED NASAL at 17:37

## 2021-05-02 RX ADMIN — SODIUM CHLORIDE, PRESERVATIVE FREE 10 ML: 5 INJECTION INTRAVENOUS at 20:36

## 2021-05-02 RX ADMIN — SODIUM CHLORIDE, PRESERVATIVE FREE 10 ML: 5 INJECTION INTRAVENOUS at 08:11

## 2021-05-02 RX ADMIN — DEXAMETHASONE 6 MG: 4 TABLET ORAL at 08:10

## 2021-05-02 RX ADMIN — ENOXAPARIN SODIUM 30 MG: 30 INJECTION SUBCUTANEOUS at 08:10

## 2021-05-02 RX ADMIN — ACETAMINOPHEN 650 MG: 325 TABLET ORAL at 12:09

## 2021-05-02 RX ADMIN — REMDESIVIR 200 MG: 100 INJECTION, POWDER, LYOPHILIZED, FOR SOLUTION INTRAVENOUS at 11:59

## 2021-05-02 RX ADMIN — AMLODIPINE BESYLATE 5 MG: 5 TABLET ORAL at 08:10

## 2021-05-02 RX ADMIN — CETIRIZINE HYDROCHLORIDE 10 MG: 10 TABLET ORAL at 17:37

## 2021-05-02 ASSESSMENT — PAIN SCALES - GENERAL
PAINLEVEL_OUTOF10: 0

## 2021-05-02 NOTE — PROGRESS NOTES
[facility]  Office: [address], DO, Ariel Ewing, DO, Nataliia Beasley, DO, Brent Juniororman Blood, DO, Ernie Suggs MD, Annita Lawson MD, Brandt Pichardo MD, Enma Ward MD, Lay Diaz MD, Sang Murphy MD, Roman Nolan MD, Sun Bennett MD, Jadyn Silverman, DO, Nabeel Mendoza MD, Summer Tobin, DO, Timi Layne MD,  Elsi Hernandez DO, Jet Arnold MD, Phil Reyes MD, Patience Parsons MD, Isiah Tineo MD, Stephanie Shine, Don Carrasquillo, CNP, Panda Melchor, CNP, Roseline Watson, CNS, Stacey Azul, CNP, Sheree Clifton, CNP, Kirti Nieto, CNP, Rolando Willard, CNP, Jerson Winkler, CNP, Marie Puri PADeniseC, Bro Reyes, [facility], Deandre Dominguez, CNP, Jose Littlejohn, CNP, Joann Newell, CNP, Arleen Lewis, CNP, Dwayne Quispe, CNP, Carmelita Westbrook, [address] [place] [address]    Progress Note    Name:   Flor Lerma  MRN:     [id number]     [place]:      [de-identified]   Room:   [address] [place] [address] Day:  1  Admit Date:  5/1/2021 11:39 AM    PCP:   Derek Armstrong MD  Code Status:  DNR-CCA  Subjective:     C/C:   Chief Complaint   Patient presents with   [facility] Fatigue    Chills    Extremity Weakness     bilateral legs    Fever     100.4 oral     Interval History Status: not changed. Patient continues to complain of diffuse myalgia, difficulty with ambulation. RN reports increased oxygen requirement on 2 L nasal cannula oxygen now. Spiked fever last evening 102.5. Hemodynamically stable this morning. CBC BMP reviewed. Sodium 129, creatinine 1.20, blood sugars elevated, hemoglobin stable 10    Brief History:   80year-old with prior history of anxiety depression fibromyalgia GERD hypertension hyperlipoidemia present to the ED with complaints of fatigue lower extremity venous fever and dry coughx 5days. Was treated with 5 days of oral antibiotic for possible sinus infection about a week ago.   ED work-up she was found to be febrile 100.4 tachypneic respiratory rate 33, elevated blood pressure 163/86, hyponatremic sodium of 129, creatinine 1.29 , Covid rapid test positive, chest x-ray was unremarkable for acute finding. CT head was done as she reported a fall prior to presentation and was unremarkable. Review of Systems:     Constitutional: + chills, fevers, sweats,+myalgias. Respiratory:  negative for cough, dyspnea on exertion, shortness of breath, wheezing  Cardiovascular:  negative for chest pain, chest pressure/discomfort, lower extremity edema, palpitations  Gastrointestinal:  negative for abdominal pain, constipation, diarrhea, nausea, vomiting  Neurological:  negative for dizziness, headache  Medications: Allergies: Allergies   Allergen Reactions    Augmentin [Amoxicillin-Pot Clavulanate] Shortness Of Breath, Photosensitivity and Nausea And Vomiting    Biaxin [Clarithromycin] Shortness Of Breath, Photosensitivity and Nausea And Vomiting    Amoxicillin Rash    Ciprofloxacin Photosensitivity, Nausea And Vomiting and Other (See Comments)     confusion       Current Meds:   Scheduled Meds:    [START ON 5/3/2021] remdesivir IVPB  100 mg Intravenous Q24H    sodium chloride flush  5-40 mL Intravenous 2 times per day    enoxaparin  30 mg Subcutaneous Daily    dexamethasone  6 mg Oral Daily    amLODIPine  5 mg Oral Daily    hydroCHLOROthiazide  25 mg Oral Daily     Continuous Infusions:    sodium chloride       PRN Meds: sodium chloride, nicotine, sodium chloride flush, sodium chloride, promethazine **OR** ondansetron, polyethylene glycol, acetaminophen **OR** acetaminophen    Data:     Past Medical History:   has a past medical history of Anemia, Anxiety, Depression, Fibromyalgia, Gastritis, GERD (gastroesophageal reflux disease), Hyperlipidemia, Hypertension, Lumbar disc disease, Neuropathy, Numbness, Osteoarthritis, Watermelon stomach, and Wears glasses.     Social History:   reports that she has quit smoking. She has never used smokeless tobacco. She reports current alcohol use. She reports that she does not use drugs. Family History:   Family History   Problem Relation Age of Onset    Kidney Disease Sister     Stroke Other     Diabetes Other        Vitals:  BP (!) 131/54   Pulse 98   Temp 99.6 °F (37.6 °C) (Oral)   Resp 20   Ht 5' 4\" (1.626 m)   Wt 155 lb 13.8 oz (70.7 kg)   SpO2 97%   BMI 26.75 kg/m²   Temp (24hrs), Av °F (37.8 °C), Min:98.8 °F (37.1 °C), Max:102.5 °F (39.2 °C)    No results for input(s): POCGLU in the last 72 hours. I/O (24Hr): Intake/Output Summary (Last 24 hours) at 2021 1533  Last data filed at 2021 1016  Gross per 24 hour   Intake 530 ml   Output 800 ml   Net -270 ml       Labs:  Hematology:  Recent Labs     21  1148 21  1933 21  0552   WBC 9.5  --  7.0   RBC 3.44*  --  3.31*   HGB 10.4*  --  10.0*   HCT 32.4*  --  31.0*   MCV 94.2  --  93.7   MCH 30.2  --  30.2   MCHC 32.1  --  32.3   RDW 14.1  --  13.7     --  152   MPV 9.6  --  10.0   CRP  --  175.3* 167.2*   INR 1.0  --   --      Chemistry:  Recent Labs     21  1148 21  0552   * 129*   K 3.7 3.9   CL 90* 91*   CO2 26 25   GLUCOSE 140* 202*   BUN 22 21   CREATININE 1.28* 1.20*   ANIONGAP 13 13   LABGLOM 39* 42*   GFRAA 48* 52*   CALCIUM 8.8 8.4*   TROPHS 21*  --      Recent Labs     21  1148   PROT 7.2   LABALBU 3.6   TSH 1.03   AST 30   ALT 13   ALKPHOS 59   BILITOT 0.31     Radiology:  CT Head Wo Contrast  Result Date: 2021  No evidence of acute intracranial process. Xr Chest Portable  Result Date: 2021  No acute cardiopulmonary process.      Physical Examination:        General appearance:  alert, cooperative and no distress  Mental Status:  oriented to person, place and time and normal affect  Lungs:  clear to auscultation bilaterally, normal effort  Heart:  regular rate and rhythm, no murmur  Abdomen:  soft, nontender, nondistended, normal bowel sounds   Extremities:  no edema, redness, tenderness in the calves  Skin:  no gross lesions, rashes, induration    Assessment:        Hospital Problems           Last Modified POA    * (Principal) Upper respiratory tract infection due to COVID-19 virus 5/2/2021 Yes    HTN (hypertension) (Chronic) 5/2/2021 Yes    Anxiety (Chronic) 5/2/2021 Yes    Depression (Chronic) 5/2/2021 Yes    Stage 3 chronic kidney disease 5/2/2021 Yes    GERD (gastroesophageal reflux disease) 5/2/2021 Yes    Fibromyalgia 5/2/2021 Yes          Plan:        -COVID-19 infection: With increased oxygen requirement and fevers,  will start the remdesivir. Pharmacy to dose. Continue dexamethasone started yesterday. Appreciate ID input. Nasal cannula O2 as needed to maintain O2 sats more than 95%. -Mild hyponatremia: unclear etiology. ?sec to nortriptyline:hold for now. Received a fluid bolus in the ED. Continue to monitor sodium Q12.  -Hypertension: Continue home dose of antihypertensives. -CKD stage III with baseline creatinine around 1.2-1.3 continue to monitor. Appears to be at baseline. -DVT/GI prophylaxis.      Darci Kaur MD  5/2/2021

## 2021-05-02 NOTE — PROGRESS NOTES
RN spoke to the patients daughter and provided an update. The daughter has spoke to her mother several times through out the day and has a concern that she may be a bit confused as evidenced by the statement,   Scooby Gonzalez been here for 3 days\" .   RN will pass info along to the nursing staff and MD.

## 2021-05-02 NOTE — PROGRESS NOTES
[facility] - Initial Consult Note COVID 19 Patient  Today's Date and Time: 5/2/2021, 9:49 AM    Impression :   · COVID 19 Suspect  · COVID 19 Confirmed Infection  · Covid tests:  · 5/1/2021 positive  · Hyponatremia  · ROQUE  · Elevated inflammatory markers  Recommendations:   · Monitor off antibiotics  · Clinical Research will approach patient to explore if he qualifies for any of the COVID 19 treatment protocols. · Remdesivir started 5/2/21 due to elevated CRP-will monitor renal and liver function   · Decadron started 5/1/2021  · Trend CRP    Medical Decision Making/Summary/Discussion:5/2/2021     · Patient admitted with suspected COVID 19 infection  · Covid test confirmed positive. Infection Control Recommendations   · Universal Precautions  · Airborne isolation  · Droplet Isolation    Antimicrobial Stewardship Recommendations     · Discontinuation of therapy  Coordination of Outpatient Care:   · Estimated Length of IV antimicrobials:TBD  · Patient will need Midline Catheter Insertion: TBD  · Patient will need PICC line Insertion: No  · Patient will need: Home IV , [place],  SNF,  LTAC:TBD  · Patient will need outpatient wound care:No    Chief complaint/reason for consultation:   · Concern for COVID infection      History of Present Illness:   Sonja Dial is a 80y.o.-year-old  female who was initially admitted on 5/1/2021. Patient seen at the request of Jeannie Amaya. INITIAL HISTORY:    Patient presented through ER with complaints of having diffuse weakness particularly of the lower extremities to the point that she could not get up, fatigue, fevers, dry cough. He reported a prior sinus infection about a week prior to her admission. She also reported some urinary incontinence with dribbling. Her evaluation in the emergency room did not show any pulmonary involvement by the chest x-ray.   However her COVID-19 test was positive on 5/1/2021 and she depression    Depression     Fibromyalgia     Gastritis     GERD (gastroesophageal reflux disease)     Hyperlipidemia     Hypertension     Lumbar disc disease     LOW BACK PAIN INTERMITTENT/STATES LARGE BULGE    Neuropathy     left leg numbness    Numbness     LEFT LEG/KNEE TO ANKLE/WEAK LEG-USES WALKER     Osteoarthritis     Watermelon stomach 02/2016    Wears glasses        Past Surgical  History:     Past Surgical History:   Procedure Laterality Date    BACK SURGERY  6/17/13    micro [id number]    CATARACT REMOVAL      wesly cataract    CHOLECYSTECTOMY      HEMORRHOID SURGERY      NASAL ENDOSCOPY      [place] ESOPHAGOGASTRODUODENOSCOPY TRANSORAL DIAGNOSTIC N/A 5/9/2017    EGD ESOPHAGOGASTRODUODENOSCOPY WITH APC performed by Jinny Cooks, MD at [facility]    UPPER GASTROINTESTINAL ENDOSCOPY         Medications:      sodium chloride flush  5-40 mL Intravenous 2 times per day    enoxaparin  30 mg Subcutaneous Daily    dexamethasone  6 mg Oral Daily    amLODIPine  5 mg Oral Daily    hydroCHLOROthiazide  25 mg Oral Daily       Social History:     Social History     Socioeconomic History    Marital status:      Spouse name: Not on file    Number of children: Not on file    Years of education: Not on file    Highest education level: Not on file   Occupational History    Not on file   Social Needs    Financial resource strain: Not on file    Food insecurity     Worry: Not on file     Inability: Not on file    Transportation needs     Medical: Not on file     Non-medical: Not on file   Tobacco Use    Smoking status: Former Smoker    Smokeless tobacco: Never Used    Tobacco comment: QUIT OVER 50 YRS AGO  6/17/13  Denies changes   Substance and Sexual Activity    Alcohol use: Yes     Comment: RARE DRINK    Drug use: No    Sexual activity: Not on file   Lifestyle    Physical activity     Days per week: Not on file     Minutes per session: Not on file    Stress: Not on file Relationships    Social connections     Talks on phone: Not on file     Gets together: Not on file     Attends Adventism service: Not on file     Active member of club or organization: Not on file     Attends meetings of clubs or organizations: Not on file     Relationship status: Not on file    Intimate partner violence     Fear of current or ex partner: Not on file     Emotionally abused: Not on file     Physically abused: Not on file     Forced sexual activity: Not on file   Other Topics Concern    Not on file   Social History Narrative    Not on file       Family History:     Family History   Problem Relation Age of Onset    Kidney Disease Sister     Stroke Other     Diabetes Other         Allergies:   Augmentin [amoxicillin-pot clavulanate], Biaxin [clarithromycin], Amoxicillin, and Ciprofloxacin     Review of Systems:       Constitutional: No fevers or chills. No systemic complaints  Head: No headaches  Eyes: No double vision or blurry vision. No conjunctival inflammation. ENT: No sore throat or runny nose. . No hearing loss, tinnitus or vertigo. Cardiovascular: No chest pain or palpitations. No Shortness of breath. No CALZADA  Lung: No Shortness of breath or cough. No sputum production  Abdomen: No nausea, vomiting, diarrhea, or abdominal pain. Delmon Green No cramps. Genitourinary: No increased urinary frequency, or dysuria. No hematuria. No suprapubic or CVA pain  Musculoskeletal: No muscle aches or pains. No joint effusions, swelling or deformities  Hematologic: No bleeding or bruising. Neurologic: No headache, weakness, numbness, or tingling. Integument: No rash, no ulcers. Psychiatric: No depression. Endocrine: No polyuria, no polydipsia, no polyphagia.     Physical Examination :     Patient Vitals for the past 8 hrs:   BP Temp Temp src Pulse Resp SpO2 Weight   05/02/21 0803 (!) 115/56 99.9 °F (37.7 °C) Oral 90 17 97 %    05/02/21 0430       155 lb 13.8 oz (70.7 kg)   05/02/21 0428 (!) 135/56 98.8 °F (37.1 °C) Oral 99 20 99 %      General Appearance: Awake, alert, and in no apparent distress  Head:  Normocephalic, no trauma  Eyes: Pupils equal, round, reactive to light; sclera anicteric; conjunctivae pink. No embolic phenomena. ENT: Oropharynx clear, without erythema, exudate, or thrush. No tenderness of sinuses. Mouth/throat: mucosa pink and moist. No lesions. Dentition in good repair. Neck:Supple, without lymphadenopathy. Thyroid normal, No bruits. Pulmonary/Chest: Clear to auscultation, without wheezes, rales, or rhonchi. No dullness to percussion. Cardiovascular: Regular rate and rhythm without murmurs, rubs, or gallops. Abdomen: Soft, non tender. Bowel sounds normal. No organomegaly  All four Extremities: No cyanosis, clubbing, edema, or effusions. Neurologic: No gross sensory or motor deficits. Skin: Warm and dry with good turgor. No signs of peripheral arterial or venous insufficiency. No ulcerations. No open wounds. Medical Decision Making -Laboratory:   I have independently reviewed/ordered the following labs:    CBC with Differential:   Recent Labs     05/01/21  1148 05/02/21  0552   WBC 9.5 7.0   HGB 10.4* 10.0*   HCT 32.4* 31.0*    152   LYMPHOPCT 12*  --    MONOPCT 12*  --      BMP:   Recent Labs     05/01/21  1148 05/02/21  0552   * 129*   K 3.7 3.9   CL 90* 91*   CO2 26 25   BUN 22 21   CREATININE 1.28* 1.20*     Hepatic Function Panel:   Recent Labs     05/01/21  1148   PROT 7.2   LABALBU 3.6   BILITOT 0.31   ALKPHOS 64   ALT 13   AST 30     No results for input(s): RPR in the last 72 hours. No results for input(s): HIV in the last 72 hours. No results for input(s): BC in the last 72 hours.   Lab Results   Component Value Date    MUCUS NOT REPORTED 05/01/2021    RBC 3.31 05/02/2021    TRICHOMONAS NOT REPORTED 05/01/2021    WBC 7.0 05/02/2021    YEAST NOT REPORTED 05/01/2021    TURBIDITY CLEAR 05/01/2021     Lab Results   Component Value Date    CREATININE 1.20 05/02/2021    GLUCOSE 202 05/02/2021       Medical Decision Making-Imaging:     EXAMINATION:   ONE XRAY VIEW OF THE CHEST       5/1/2021 12:02 pm       COMPARISON:   Chest radiograph performed 09/22/2020.       HISTORY:   ORDERING SYSTEM PROVIDED HISTORY: concern for tachycardia, fatigue   TECHNOLOGIST PROVIDED HISTORY:   concern for tachycardia, fatigue       FINDINGS:   There is no acute consolidation or effusion.  There is no pneumothorax.  The   mediastinal structures are unremarkable.  The upper abdomen is unremarkable. The extrathoracic soft tissues are unremarkable. Montes Maizes is no acute osseous   abnormality.           Impression   No acute cardiopulmonary process. EXAMINATION:   CT OF THE HEAD WITHOUT CONTRAST  5/1/2021 2:00 pm       TECHNIQUE:   CT of the head was performed without the administration of intravenous   contrast. Dose modulation, iterative reconstruction, and/or weight based   adjustment of the mA/kV was utilized to reduce the radiation dose to as low   as reasonably achievable.       COMPARISON:   January 6, 2020.       HISTORY:   ORDERING SYSTEM PROVIDED HISTORY: concern for fall off of toilet a couple of   weeks ago   TECHNOLOGIST PROVIDED HISTORY:       concern for fall off of toilet a couple of weeks ago   Decision Support Exception - unselect if not a suspected or confirmed   emergency medical condition->Emergency Medical Condition (MA)       FINDINGS:   BRAIN/VENTRICLES: The gyri and sulci have a normal appearance. There is mild   cortical and cerebellar volume loss with associated ex vacuo ventricular   dilation.  Mild diffuse periventricular and subcortical deep white matter   hypoattenuation noted, findings compatible with chronic small vessel ischemic   disease.  A mild degree of encephalomalacia within the left parietal and   temporal lobe regions is unchanged.  The gray-white matter differentiation is   otherwise preserved throughout. Montes Maizes is no acute hemorrhage, mass, or mass   effect.  No evidence of acute territorial infarct.  No abnormal extraaxial   fluid collections.       ORBITS:  The visualized portion of the orbits demonstrate no acute   abnormality.       SINUSES:  The mastoid air cells are normally aerated.  The visualized   paranasal sinuses are grossly clear.       SOFT TISSUES/SKULL:  No significant abnormality of the visualized skull or   soft tissues. No acute fracture. No scalp hematoma.           Impression   No evidence of acute intracranial process. Medical Decision Ocylfp-[id number]-Iuccl:       Medical Decision Making-Other:     Note:  · Labs, medications, radiologic studies were reviewed with personal review of films  · Large amounts of data were reviewed  · Discussed with nursing Staff, Discharge planner  · Infection Control and Prevention measures reviewed  · All prior entries were reviewed  · Administer medications as ordered  · Prognosis: Guarded  · Discharge planning reviewed  · Follow up as outpatient. Thank you for allowing us to participate in the care of this patient. Please call with questions. GUERRERO Boyle - CNP     ATTESTATION:    I have discussed the case, including pertinent history and exam findings with the APRN. I have evaluated the  History, physical findings and pictures of the patient and the key elements of the encounter have been performed by me. I have reviewed the laboratory data, other diagnostic studies and discussed them with the APRN. I have updated the medical record where necessary. I agree with the assessment, plan and orders as documented by the APRN.     Cathi Adams MD.      Pager: [phone] - Office: [phone]

## 2021-05-03 LAB
ABSOLUTE EOS #: 0 K/UL (ref 0–0.4)
ABSOLUTE IMMATURE GRANULOCYTE: 0.09 K/UL (ref 0–0.3)
ABSOLUTE LYMPH #: 0.46 K/UL (ref 1–4.8)
ABSOLUTE MONO #: 0.64 K/UL (ref 0.1–0.8)
ALBUMIN SERPL-MCNC: 3 G/DL (ref 3.5–5.2)
ALBUMIN/GLOBULIN RATIO: 1 (ref 1–2.5)
ALP BLD-CCNC: 49 U/L (ref 35–104)
ALT SERPL-CCNC: 32 U/L (ref 5–33)
ANION GAP SERPL CALCULATED.3IONS-SCNC: 15 MMOL/L (ref 9–17)
AST SERPL-CCNC: 61 U/L
BASOPHILS # BLD: 0 % (ref 0–2)
BASOPHILS ABSOLUTE: 0 K/UL (ref 0–0.2)
BILIRUB SERPL-MCNC: 0.16 MG/DL (ref 0.3–1.2)
BUN BLDV-MCNC: 43 MG/DL (ref 8–23)
BUN/CREAT BLD: ABNORMAL (ref 9–20)
C-REACTIVE PROTEIN: 93.9 MG/L (ref 0–5)
CALCIUM SERPL-MCNC: 7.7 MG/DL (ref 8.6–10.4)
CHLORIDE BLD-SCNC: 90 MMOL/L (ref 98–107)
CO2: 22 MMOL/L (ref 20–31)
CREAT SERPL-MCNC: 1.47 MG/DL (ref 0.5–0.9)
DIFFERENTIAL TYPE: ABNORMAL
EOSINOPHILS RELATIVE PERCENT: 0 % (ref 1–4)
GFR AFRICAN AMERICAN: 41 ML/MIN
GFR NON-AFRICAN AMERICAN: 34 ML/MIN
GFR SERPL CREATININE-BSD FRML MDRD: ABNORMAL ML/MIN/{1.73_M2}
GFR SERPL CREATININE-BSD FRML MDRD: ABNORMAL ML/MIN/{1.73_M2}
GLUCOSE BLD-MCNC: 221 MG/DL (ref 70–99)
HCT VFR BLD CALC: 28.1 % (ref 36.3–47.1)
HEMOGLOBIN: 9.1 G/DL (ref 11.9–15.1)
IMMATURE GRANULOCYTES: 1 %
LYMPHOCYTES # BLD: 5 % (ref 24–44)
MCH RBC QN AUTO: 30.1 PG (ref 25.2–33.5)
MCHC RBC AUTO-ENTMCNC: 32.4 G/DL (ref 28.4–34.8)
MCV RBC AUTO: 93 FL (ref 82.6–102.9)
MONOCYTES # BLD: 7 % (ref 1–7)
MORPHOLOGY: NORMAL
NRBC AUTOMATED: 0 PER 100 WBC
OSMOLALITY URINE: 313 MOSM/KG (ref 80–1300)
PDW BLD-RTO: 13.7 % (ref 11.8–14.4)
PLATELET # BLD: 166 K/UL (ref 138–453)
PLATELET ESTIMATE: ABNORMAL
PMV BLD AUTO: 10.7 FL (ref 8.1–13.5)
POTASSIUM SERPL-SCNC: 3.8 MMOL/L (ref 3.7–5.3)
RBC # BLD: 3.02 M/UL (ref 3.95–5.11)
RBC # BLD: ABNORMAL 10*6/UL
SEG NEUTROPHILS: 87 % (ref 36–66)
SEGMENTED NEUTROPHILS ABSOLUTE COUNT: 8.01 K/UL (ref 1.8–7.7)
SERUM OSMOLALITY: 285 MOSM/KG (ref 275–295)
SODIUM BLD-SCNC: 127 MMOL/L (ref 135–144)
SODIUM,UR: 25 MMOL/L
TOTAL PROTEIN: 6.1 G/DL (ref 6.4–8.3)
WBC # BLD: 9.2 K/UL (ref 3.5–11.3)
WBC # BLD: ABNORMAL 10*3/UL

## 2021-05-03 PROCEDURE — 94761 N-INVAS EAR/PLS OXIMETRY MLT: CPT

## 2021-05-03 PROCEDURE — 6370000000 HC RX 637 (ALT 250 FOR IP): Performed by: NURSE PRACTITIONER

## 2021-05-03 PROCEDURE — 99233 SBSQ HOSP IP/OBS HIGH 50: CPT | Performed by: INTERNAL MEDICINE

## 2021-05-03 PROCEDURE — 6370000000 HC RX 637 (ALT 250 FOR IP): Performed by: INTERNAL MEDICINE

## 2021-05-03 PROCEDURE — 99232 SBSQ HOSP IP/OBS MODERATE 35: CPT | Performed by: INTERNAL MEDICINE

## 2021-05-03 PROCEDURE — APPSS30 APP SPLIT SHARED TIME 16-30 MINUTES: Performed by: NURSE PRACTITIONER

## 2021-05-03 PROCEDURE — 80053 COMPREHEN METABOLIC PANEL: CPT

## 2021-05-03 PROCEDURE — 86140 C-REACTIVE PROTEIN: CPT

## 2021-05-03 PROCEDURE — 83935 ASSAY OF URINE OSMOLALITY: CPT

## 2021-05-03 PROCEDURE — 84295 ASSAY OF SERUM SODIUM: CPT

## 2021-05-03 PROCEDURE — 83930 ASSAY OF BLOOD OSMOLALITY: CPT

## 2021-05-03 PROCEDURE — 2580000003 HC RX 258: Performed by: INTERNAL MEDICINE

## 2021-05-03 PROCEDURE — 97535 SELF CARE MNGMENT TRAINING: CPT

## 2021-05-03 PROCEDURE — 84300 ASSAY OF URINE SODIUM: CPT

## 2021-05-03 PROCEDURE — 6360000002 HC RX W HCPCS: Performed by: INTERNAL MEDICINE

## 2021-05-03 PROCEDURE — 85025 COMPLETE CBC W/AUTO DIFF WBC: CPT

## 2021-05-03 PROCEDURE — 2700000000 HC OXYGEN THERAPY PER DAY

## 2021-05-03 PROCEDURE — 97166 OT EVAL MOD COMPLEX 45 MIN: CPT

## 2021-05-03 PROCEDURE — 2580000003 HC RX 258: Performed by: NURSE PRACTITIONER

## 2021-05-03 PROCEDURE — 97530 THERAPEUTIC ACTIVITIES: CPT

## 2021-05-03 PROCEDURE — 2500000003 HC RX 250 WO HCPCS: Performed by: NURSE PRACTITIONER

## 2021-05-03 PROCEDURE — 2060000000 HC ICU INTERMEDIATE R&B

## 2021-05-03 PROCEDURE — 36415 COLL VENOUS BLD VENIPUNCTURE: CPT

## 2021-05-03 RX ORDER — SODIUM CHLORIDE 9 MG/ML
INJECTION, SOLUTION INTRAVENOUS CONTINUOUS
Status: DISCONTINUED | OUTPATIENT
Start: 2021-05-03 | End: 2021-05-04

## 2021-05-03 RX ADMIN — REMDESIVIR 100 MG: 100 INJECTION, POWDER, LYOPHILIZED, FOR SOLUTION INTRAVENOUS at 12:01

## 2021-05-03 RX ADMIN — ENOXAPARIN SODIUM 30 MG: 30 INJECTION SUBCUTANEOUS at 07:57

## 2021-05-03 RX ADMIN — SODIUM CHLORIDE, PRESERVATIVE FREE 10 ML: 5 INJECTION INTRAVENOUS at 07:58

## 2021-05-03 RX ADMIN — AMLODIPINE BESYLATE 5 MG: 5 TABLET ORAL at 07:57

## 2021-05-03 RX ADMIN — CETIRIZINE HYDROCHLORIDE 10 MG: 10 TABLET ORAL at 07:57

## 2021-05-03 RX ADMIN — HYDROCHLOROTHIAZIDE 25 MG: 25 TABLET ORAL at 07:57

## 2021-05-03 RX ADMIN — ONDANSETRON 4 MG: 2 INJECTION INTRAMUSCULAR; INTRAVENOUS at 12:55

## 2021-05-03 RX ADMIN — DEXAMETHASONE 6 MG: 4 TABLET ORAL at 07:57

## 2021-05-03 RX ADMIN — FLUTICASONE PROPIONATE 2 SPRAY: 50 SPRAY, METERED NASAL at 07:58

## 2021-05-03 ASSESSMENT — PAIN SCALES - GENERAL
PAINLEVEL_OUTOF10: 0
PAINLEVEL_OUTOF10: 0

## 2021-05-03 ASSESSMENT — ENCOUNTER SYMPTOMS: TACHYPNEA: 1

## 2021-05-03 NOTE — PROGRESS NOTES
[facility] - Progress Note   COVID 19 Patient  Today's Date and Time: 5/3/2021, 10:17 AM    Impression :   · COVID 19 Suspect  · COVID 19 Confirmed Infection  · Covid tests:  · 5/1/2021 positive  · Hyponatremia  · ROQUE  · Elevated inflammatory markers  Recommendations:   · Monitor off antibiotics  · Clinical Research will approach patient to explore if he qualifies for any of the COVID 19 treatment protocols. · Remdesivir started 5/2/21 due to elevated CRP-will monitor renal and liver function   · Decadron started 5/1/2021  · Trend CRP    Medical Decision Making/Summary/Discussion:5/3/2021     · Patient admitted with suspected COVID 19 infection  · Covid test confirmed positive. Infection Control Recommendations   · Universal Precautions  · Airborne isolation  · Droplet Isolation    Antimicrobial Stewardship Recommendations     · Discontinuation of therapy  Coordination of Outpatient Care:   · Estimated Length of IV antimicrobials:TBD  · Patient will need Midline Catheter Insertion: TBD  · Patient will need PICC line Insertion: No  · Patient will need: Home IV , [place],  SNF,  LTAC:TBD  · Patient will need outpatient wound care:No    Chief complaint/reason for consultation:   · Concern for COVID infection      History of Present Illness:   Amalia Keen is a 80y.o.-year-old  female who was initially admitted on 5/1/2021. Patient seen at the request of Ivet Berry. INITIAL HISTORY:    Patient presented through ER with complaints of having diffuse weakness particularly of the lower extremities to the point that she could not get up, fatigue, fevers, dry cough. He reported a prior sinus infection about a week prior to her admission. She also reported some urinary incontinence with dribbling. Her evaluation in the emergency room did not show any pulmonary involvement by the chest x-ray.   However her COVID-19 test was positive on 5/1/2021 and she was found to be hyponatremic    Patient admitted because of concerns with COVID 19.    CURRENT EVALUATION : 5/3/2021    Afebrile  VS stable    The patient is alert and oriented on 1-->2 L NC. Due to very elevated CRP, Remdesivir was initiated on 5/2/21  Hyponatremia continues-0.9 % NS started @ 75 ml/hr    Discussed with RN and Dr. Briones Rm    Patient exhibiting respiratory distress. With exertion  Respiratory secretions: None    Patient receiving supplemental oxygen. 2-->1 L nasal cannula   02 sat 94-->97-->95  RR 25-->17-->25      % FIO2:   PEEP:      QTc:       NEWS Score: 0-4 Low risk group; 5-6: Medium risk group; 7 or above: High risk group  Parameters 3 2 1 0 1 2 3   Age    < 65   ? 65   RR ? 8  9-11 12-20  21-24 ? 25   O2 Sats ? 91 92-93 94-95 ? 96      Suppl O2  Yes  No      SBP ? 90  101-110 111-219   ? 220   HR ? 40  41-50 51-90  111-130 ? 131   Consciousness    Alert   Drowsiness, lethargy, or confusion   Temperature ? 35.0 C (95.0 F)  35.1-36.0 C 95.1-96.9 F 36.1-38.0 C 97.0-100.4 F 38.1-39.0 C 100.5-102.3 F ? 39.1 C ? 102.4 F      NEWS Score:   5/1/2021: 11 high risk    Overall Daily Picture:    Unchanged    Presence of secondary bacterial Infection:  No   Additional antibiotics: No    Labs, X rays reviewed: 5/3/2021    BUN: 22-->21  Cr: 1.28-->1.20  Sodium 129-->129    WBC: 9.5-->7.0  Hb: 10.4-->10.0  Plat: 177-->152    Absolute Neutrophils: 7.1  Absolute Lymphocytes: 1.14  Neutrophil/Lymphocyte Ratio: 6.22 high risk    CRP:175.3-->167.2  Ferritin:  LDH:     Pro Calcitonin:  Troponin high-sensitivity 21      Cultures:  Urine:  ·   Blood:  · 5/1/2021 2 days no growth  Sputum :  ·   Wound:       CXR:   · 5/1/2021 no pulmonary involvement by chest x-ray  CAT:      Discussed with patient, RN, CC, IM. I have personally reviewed the past medical history, past surgical history, medications, social history, and family history, and I have updated the database accordingly.   Past Medical History:     Past Medical History:   Diagnosis Date    Anemia     Anxiety     anxiety, depression    Depression     Fibromyalgia     Gastritis     GERD (gastroesophageal reflux disease)     Hyperlipidemia     Hypertension     Lumbar disc disease     LOW BACK PAIN INTERMITTENT/STATES LARGE BULGE    Neuropathy     left leg numbness    Numbness     LEFT LEG/KNEE TO ANKLE/WEAK LEG-USES WALKER     Osteoarthritis     Watermelon stomach 02/2016    Wears glasses        Past Surgical  History:     Past Surgical History:   Procedure Laterality Date    BACK SURGERY  6/17/13    micro [id number]    CATARACT REMOVAL      wesly cataract    CHOLECYSTECTOMY      HEMORRHOID SURGERY      NASAL ENDOSCOPY      [place] ESOPHAGOGASTRODUODENOSCOPY TRANSORAL DIAGNOSTIC N/A 5/9/2017    EGD ESOPHAGOGASTRODUODENOSCOPY WITH APC performed by Luzma Day MD at [facility]    UPPER GASTROINTESTINAL ENDOSCOPY         Medications:      remdesivir IVPB  100 mg Intravenous Q24H    fluticasone  2 spray Nasal Daily    cetirizine  10 mg Oral Daily    sodium chloride flush  5-40 mL Intravenous 2 times per day    enoxaparin  30 mg Subcutaneous Daily    dexamethasone  6 mg Oral Daily    amLODIPine  5 mg Oral Daily    hydroCHLOROthiazide  25 mg Oral Daily       Social History:     Social History     Socioeconomic History    Marital status:      Spouse name: Not on file    Number of children: Not on file    Years of education: Not on file    Highest education level: Not on file   Occupational History    Not on file   Social Needs    Financial resource strain: Not on file    Food insecurity     Worry: Not on file     Inability: Not on file    Transportation needs     Medical: Not on file     Non-medical: Not on file   Tobacco Use    Smoking status: Former Smoker    Smokeless tobacco: Never Used    Tobacco comment: QUIT OVER 50 YRS AGO  6/17/13  Denies changes   Substance and Sexual Activity    Alcohol use: Yes     Comment: RARE DRINK    Drug use: No    Sexual activity: Not on file   Lifestyle    Physical activity     Days per week: Not on file     Minutes per session: Not on file    Stress: Not on file   Relationships    Social connections     Talks on phone: Not on file     Gets together: Not on file     Attends Episcopalian service: Not on file     Active member of club or organization: Not on file     Attends meetings of clubs or organizations: Not on file     Relationship status: Not on file    Intimate partner violence     Fear of current or ex partner: Not on file     Emotionally abused: Not on file     Physically abused: Not on file     Forced sexual activity: Not on file   Other Topics Concern    Not on file   Social History Narrative    Not on file       Family History:     Family History   Problem Relation Age of Onset    Kidney Disease Sister     Stroke Other     Diabetes Other         Allergies:   Augmentin [amoxicillin-pot clavulanate], Biaxin [clarithromycin], Amoxicillin, and Ciprofloxacin     Review of Systems:       Constitutional: No fevers or chills. No systemic complaints  Head: No headaches  Eyes: No double vision or blurry vision. No conjunctival inflammation. ENT: No sore throat or runny nose. . No hearing loss, tinnitus or vertigo. Cardiovascular: No chest pain or palpitations. No Shortness of breath. No CALZADA  Lung: No Shortness of breath or cough. No sputum production  Abdomen: No nausea, vomiting, diarrhea, or abdominal pain. Maryln Solar No cramps. Genitourinary: No increased urinary frequency, or dysuria. No hematuria. No suprapubic or CVA pain  Musculoskeletal: No muscle aches or pains. No joint effusions, swelling or deformities  Hematologic: No bleeding or bruising. Neurologic: No headache, weakness, numbness, or tingling. Integument: No rash, no ulcers. Psychiatric: No depression. Endocrine: No polyuria, no polydipsia, no polyphagia.     Physical Examination :     Patient Vitals for the past 8 hrs:   BP Temp Temp src Pulse Resp SpO2 Weight   05/03/21 0745 (!) 143/53 99.1 °F (37.3 °C) Oral       05/03/21 0430 116/89 99.3 °F (37.4 °C) Axillary 110 27 90 % 155 lb 10.3 oz (70.6 kg)     General Appearance: Awake, alert, and in no apparent distress  Head:  Normocephalic, no trauma  Eyes: Pupils equal, round, reactive to light; sclera anicteric; conjunctivae pink. No embolic phenomena. ENT: Oropharynx clear, without erythema, exudate, or thrush. No tenderness of sinuses. Mouth/throat: mucosa pink and moist. No lesions. Dentition in good repair. Neck:Supple, without lymphadenopathy. Thyroid normal, No bruits. Pulmonary/Chest: Clear to auscultation, without wheezes, rales, or rhonchi. No dullness to percussion. Cardiovascular: Regular rate and rhythm without murmurs, rubs, or gallops. Abdomen: Soft, non tender. Bowel sounds normal. No organomegaly  All four Extremities: No cyanosis, clubbing, edema, or effusions. Neurologic: No gross sensory or motor deficits. Skin: Warm and dry with good turgor. No signs of peripheral arterial or venous insufficiency. No ulcerations. No open wounds. Medical Decision Making -Laboratory:   I have independently reviewed/ordered the following labs:    CBC with Differential:   Recent Labs     05/01/21  1148 05/02/21  0552   WBC 9.5 7.0   HGB 10.4* 10.0*   HCT 32.4* 31.0*    152   LYMPHOPCT 12*  --    MONOPCT 12*  --      BMP:   Recent Labs     05/01/21  1148 05/02/21  0552 05/03/21  0439   * 129* 127*   K 3.7 3.9  --    CL 90* 91*  --    CO2 26 25  --    BUN 22 21  --    CREATININE 1.28* 1.20*  --      Hepatic Function Panel:   Recent Labs     05/01/21  1148   PROT 7.2   LABALBU 3.6   BILITOT 0.31   ALKPHOS 64   ALT 13   AST 30     No results for input(s): RPR in the last 72 hours. No results for input(s): HIV in the last 72 hours. No results for input(s): BC in the last 72 hours.   Lab Results   Component Value Date    MUCUS NOT REPORTED 05/01/2021    RBC 3.31 05/02/2021    TRICHOMONAS NOT REPORTED 05/01/2021    WBC 7.0 05/02/2021    YEAST NOT REPORTED 05/01/2021    TURBIDITY CLEAR 05/01/2021     Lab Results   Component Value Date    CREATININE 1.20 05/02/2021    GLUCOSE 202 05/02/2021       Medical Decision Making-Imaging:     EXAMINATION:   ONE XRAY VIEW OF THE CHEST       5/1/2021 12:02 pm       COMPARISON:   Chest radiograph performed 09/22/2020.       HISTORY:   ORDERING SYSTEM PROVIDED HISTORY: concern for tachycardia, fatigue   TECHNOLOGIST PROVIDED HISTORY:   concern for tachycardia, fatigue       FINDINGS:   There is no acute consolidation or effusion.  There is no pneumothorax.  The   mediastinal structures are unremarkable.  The upper abdomen is unremarkable. The extrathoracic soft tissues are unremarkable. Chet Motto is no acute osseous   abnormality.           Impression   No acute cardiopulmonary process. EXAMINATION:   CT OF THE HEAD WITHOUT CONTRAST  5/1/2021 2:00 pm       TECHNIQUE:   CT of the head was performed without the administration of intravenous   contrast. Dose modulation, iterative reconstruction, and/or weight based   adjustment of the mA/kV was utilized to reduce the radiation dose to as low   as reasonably achievable.       COMPARISON:   January 6, 2020.       HISTORY:   ORDERING SYSTEM PROVIDED HISTORY: concern for fall off of toilet a couple of   weeks ago   TECHNOLOGIST PROVIDED HISTORY:       concern for fall off of toilet a couple of weeks ago   Decision Support Exception - unselect if not a suspected or confirmed   emergency medical condition->Emergency Medical Condition (MA)       FINDINGS:   BRAIN/VENTRICLES: The gyri and sulci have a normal appearance. There is mild   cortical and cerebellar volume loss with associated ex vacuo ventricular   dilation.  Mild diffuse periventricular and subcortical deep white matter   hypoattenuation noted, findings compatible with chronic small vessel ischemic   disease.  A

## 2021-05-03 NOTE — PROGRESS NOTES
Occupational Therapy   Occupational Therapy Initial Assessment  Date: 5/3/2021   Patient Name: Lindsey Lennox  MRN: [id number]     : 1934     Chief Complaint   Patient presents with    Fatigue    Chills    Extremity Weakness     bilateral legs    Fever     100.4 oral       Date of Service: 5/3/2021    Discharge Recommendations:  Patient would benefit from continued therapy after discharge       Assessment   Performance deficits / Impairments: Decreased functional mobility ; Decreased ADL status; Decreased safe awareness;Decreased cognition;Decreased endurance;Decreased balance;Decreased high-level IADLs  Assessment: Pt agreeable to OT eval this date. Pt completed bed mobility with Min A-CGA. Pt demo ability to sit EOB unsupported ~15 minutes. Pt completed LB dressing task with Min A to maintain figure 4 position while donning L sock. Pt completed functional transfers with CGA and side steps at EOB with RW and Min A. Pt unable to complete further activity this date d/t dizziness. Pt educated on safety awareness and ways to assist in diminishing dizziness; pt demonstrated ability to perform accurately. Pt will require continued OT services to increase safety, independence, balance, and cognition for increased ability to perform ADLs/IADLs and functional transfers/mobility  Prognosis: Good  Decision Making: Medium Complexity  Patient Education: Pt educated on OT role, OT POC, safety awareness, ways to diminish dizziness, bed mobility training, RW use, hand placement during transfers, and importance of continued OT. Pt verbalized good understanding  REQUIRES OT FOLLOW UP: Yes  Activity Tolerance  Activity Tolerance: Patient Tolerated treatment well;Treatment limited secondary to medical complications (free text)  Activity Tolerance: dizziness limiting session somewhat  Safety Devices  Safety Devices in place: Yes  Type of devices: Nurse notified; Left in bed;Gait belt;Call light within reach; Bed alarm in place Restraints  Initially in place: No           Patient Diagnosis(es): The encounter diagnosis was COVID-19.     has a past medical history of Anemia, Anxiety, Depression, Fibromyalgia, Gastritis, GERD (gastroesophageal reflux disease), Hyperlipidemia, Hypertension, Lumbar disc disease, Neuropathy, Numbness, Osteoarthritis, Watermelon stomach, and Wears glasses. has a past surgical history that includes Cholecystectomy; nasal endoscopy; Hemorrhoid surgery; back surgery (6/17/13); Cataract removal; Upper gastrointestinal endoscopy; and pr esophagogastroduodenoscopy transoral diagnostic (N/A, 5/9/2017). Restrictions  Restrictions/Precautions  Restrictions/Precautions: Isolation, Fall Risk, Up as Tolerated(COVID+ /droplet +)  Required Braces or Orthoses?: No  Position Activity Restriction  Other position/activity restrictions: up with assist, 3L O2    Subjective   General  Patient assessed for rehabilitation services?: Yes  Family / Caregiver Present: No  General Comment  Comments: RN ok'd pt for OT eval this date.  Pt agreeable to session and pleasant/cooperative throughout  Pain Assessment  Pain Assessment: 0-10  Pain Level: 0    Social/Functional History  Social/Functional History  Lives With: Spouse  Type of Home: House  Home Layout: One level  Home Access: Stairs to enter without rails  Entrance Stairs - Number of Steps: 1  Bathroom Shower/Tub: Walk-in shower, Tub/Shower unit, Doors(pt reports typically uses walk-in shower)  Bathroom Toilet: Standard  Bathroom Equipment: Grab bars in shower, Shower chair  Home Equipment: Cane, Rolling walker(pt reports typically no use of DME)  ADL Assistance: Independent  Homemaking Assistance: Independent  Homemaking Responsibilities: Yes  Ambulation Assistance: Independent  Transfer Assistance: Independent  Active : No  Patient's  Info:   Mode of Transportation: SUV  Occupation: Retired  Leisure & Hobbies: pt enjoys playing [employer] in the [employer], doing puzzles, spending time with family  Additional Comments: Pt reports increased difficulty with ADL/IADL tasks recently d/t generalized weakness and fatigue       Objective   Vision: Impaired  Vision Exceptions: Wears glasses at all times  Hearing: Within functional limits    Orientation  Overall Orientation Status: Within Functional Limits    Balance  Sitting Balance: Stand by assistance(pt c/o increased dizziness upon sitting upright. Vitals remaining WNL. Pt educated to stare straight ahead and perform pursed lip breathing. Pt completed for ~1 minute and stated dizziness subsided)  Standing Balance: Contact guard assistance  Standing Balance  Time: ~2 minutes  Activity: pt stood EOB with RW; pt c/o increased dizziness  Functional Mobility  Functional - Mobility Device: Rolling Walker  Activity: Other  Assist Level: Minimal assistance  Functional Mobility Comments: Pt completed 2 side steps to R toward HOB with RW and Min A d/t slight unsteadiness. Pt returned to seated position after short functional mobility task d/t dizziness. Pt requires increased time     ADL  Feeding: Independent;Setup; Increased time to complete  Grooming: Stand by assistance;Setup; Increased time to complete  UE Bathing: Stand by assistance;Setup; Increased time to complete  LE Bathing: Minimal assistance;Setup; Increased time to complete  UE Dressing: Stand by assistance;Setup; Increased time to complete  LE Dressing: Minimal assistance;Setup; Increased time to complete(pt donned R sock with CGA, requires Min A to maintain figure 4 position to don L sock)  Toileting: Contact guard assistance;Setup; Increased time to complete  Tone RUE  RUE Tone: Normotonic  Tone LUE  LUE Tone: Normotonic  Coordination  Movements Are Fluid And Coordinated: Yes    Bed mobility  Supine to Sit: Minimal assistance(Min A handheld assist for trunk progression)  Sit to Supine: Contact guard assistance  Scooting: Contact guard assistance  Comment: HOB elevated ~30 degrees  Transfers  Sit to stand: Contact guard assistance  Stand to sit: Contact guard assistance  Transfer Comments: with RW    Cognition  Overall Cognitive Status: Exceptions  Following Commands: Follows multistep commands with repitition  Safety Judgement: Decreased awareness of need for assistance;Decreased awareness of need for safety  Insights: Decreased awareness of deficits  Initiation: Requires cues for some       Sensation  Overall Sensation Status: Impaired(pt reports chronic numbness/tingling in BLE.  Pt reports new numbness/tingling in buttocks)        LUE AROM (degrees)  LUE AROM : WFL  Left Hand AROM (degrees)  Left Hand AROM: WFL  RUE AROM (degrees)  RUE AROM : WFL  Right Hand AROM (degrees)  Right Hand AROM: WFL  LUE Strength  Gross LUE Strength: WFL  L Hand General: 4/5  LUE Strength Comment: grossly 4/5  RUE Strength  Gross RUE Strength: WFL  R Hand General: 4/5  RUE Strength Comment: grossly 4/5     Plan   Plan  Times per week: 3-5 x/wk  Current Treatment Recommendations: Balance Training, Functional Mobility Training, Endurance Training, Cognitive Reorientation, Safety Education & Training, Patient/Caregiver Education & Training, Equipment Evaluation, Education, & procurement, Self-Care / ADL, Home Management Training    AM-PAC Score  AM-[facility] Inpatient Daily Activity Raw Score: 19 (05/03/21 1328)  AM-PAC Inpatient ADL T-Scale Score : 40.22 (05/03/21 1328)  ADL Inpatient CMS 0-100% Score: 42.8 (05/03/21 1328)  ADL Inpatient CMS G-Code Modifier : CK (05/03/21 1328)    Goals  Short term goals  Time Frame for Short term goals: By discharge, pt will:  Short term goal 1: Demo Mod I for functional transfers and functional mobility with use of LRD  Short term goal 2: Demo I with setup for UB ADLs and grooming tasks  Short term goal 3: Demo SBA with setup for LB ADLs and toileting tasks  Short term goal 4: Demo 10+ minutes dynamic standing task to increase balance for independence with ADLs/IADLs  Short term goal 5: Demo good safety awareness with 0 VCs throughout therapy session       Therapy Time   Individual Concurrent Group Co-treatment   Time In 0902         Time Out 0926         Minutes 24         Timed Code Treatment Minutes: 23 Minutes       Maria Del Rosario Blinks, OTR/L

## 2021-05-03 NOTE — PROGRESS NOTES
[facility]  Office: [address], DO, Shelly Dines, DO, Quiana Mariana, DO, Nan Codding Blood, DO, Reynold Underwood MD, Lj Gibson MD, Winter Vivas MD, Javi Ybarra MD, Zofia Sheth MD, Lesvia Buchanan MD, Dayanna Plasencia MD, Eden Panchal MD, Isai Abdi, DO, Chitra Prieto MD, Jazmin Hodges DO, Talia Gray MD,  Ben England, DO, Nahed Sherman MD, Tasia Jaime MD, Cynthia Jameson MD, Carmine Broussard MD, John Luevano, Dalila Scrape, CNP, Handy Amas, CNP, Jennifer Pulse, CNS, Kayy Halsted, CNP, Annel Eagle, CNP, Anay Dinning, CNP, Pilar Pulse, CNP, Teresa Nielsen, CNP, Rivka Huynh PA-C, [place] , [facility], Crispin Jj, CNP, Rosetta Bennett, CNP, Aleta Mcfadden, CNP, Dennys Eason, CNP, Pancho Jasso, CNP, Ritu Boo, [address] [place] [address]    Progress Note    Name:   Roscoe Cooper  MRN:     [id number]     [place]:      [de-identified]   Room:   [facility] Day:  2  Admit Date:  5/1/2021 11:39 AM    PCP:   Rebeca Hodgkins, MD  Code Status:  DNR-CCA  Subjective:     C/C:   Chief Complaint   Patient presents with   Vivi Latin Fatigue    Chills    Extremity Weakness     bilateral legs    Fever     100.4 oral     Interval History Status: not changed. Patient complaining of increased fatigue this morning. Denies chest pain or shortness of breath. Per RN was able to walk to the bathroom with help. Tmax 99.9, Hemodynamically stable. Maintaining O2 sats on 2 L nasal cannula oxygen    Sodium 127   Family last evening expressed concern of possible confusion. She is alert, ox4, giving timeline of her symptoms although quite weak this morning. Brief History:   80year-old with prior history of anxiety depression fibromyalgia GERD hypertension hyperlipoidemia present to the ED with complaints of fatigue lower extremity venous fever and dry coughx 5days.   Was treated with 5 days of oral antibiotic for possible sinus infection about a week ago. ED work-up she was found to be febrile 100.4 tachypneic respiratory rate 33, elevated blood pressure 163/86, hyponatremic sodium of 129, creatinine 1.29 , Covid rapid test positive, chest x-ray was unremarkable for acute finding. CT head was done as she reported a fall prior to presentation and was unremarkable. Review of Systems:     Constitutional: + chills, fevers, sweats,+myalgias. Respiratory:  negative for cough, dyspnea on exertion, shortness of breath, wheezing  Cardiovascular:  negative for chest pain, chest pressure/discomfort, lower extremity edema, palpitations  Gastrointestinal:  negative for abdominal pain, constipation, diarrhea, nausea, vomiting  Neurological:  negative for dizziness, headache  Medications: Allergies:     Allergies   Allergen Reactions    Augmentin [Amoxicillin-Pot Clavulanate] Shortness Of Breath, Photosensitivity and Nausea And Vomiting    Biaxin [Clarithromycin] Shortness Of Breath, Photosensitivity and Nausea And Vomiting    Amoxicillin Rash    Ciprofloxacin Photosensitivity, Nausea And Vomiting and Other (See Comments)     confusion       Current Meds:   Scheduled Meds:    remdesivir IVPB  100 mg Intravenous Q24H    fluticasone  2 spray Nasal Daily    cetirizine  10 mg Oral Daily    sodium chloride flush  5-40 mL Intravenous 2 times per day    enoxaparin  30 mg Subcutaneous Daily    dexamethasone  6 mg Oral Daily    amLODIPine  5 mg Oral Daily    hydroCHLOROthiazide  25 mg Oral Daily     Continuous Infusions:    sodium chloride 75 mL/hr at 05/03/21 0939    sodium chloride       PRN Meds: sodium chloride, ALPRAZolam, nicotine, sodium chloride flush, sodium chloride, promethazine **OR** ondansetron, polyethylene glycol, acetaminophen **OR** acetaminophen    Data:     Past Medical History:   has a past medical history of Anemia, Anxiety, Depression, Fibromyalgia, Gastritis, GERD (gastroesophageal reflux disease), Hyperlipidemia, Hypertension, Lumbar disc disease, Neuropathy, Numbness, Osteoarthritis, Watermelon stomach, and Wears glasses. Social History:   reports that she has quit smoking. She has never used smokeless tobacco. She reports current alcohol use. She reports that she does not use drugs. Family History:   Family History   Problem Relation Age of Onset    Kidney Disease Sister     Stroke Other     Diabetes Other        Vitals:  BP (!) 114/55   Pulse 110   Temp 99.1 °F (37.3 °C) (Oral)   Resp 26   Ht 5' 4\" (1.626 m)   Wt 155 lb 10.3 oz (70.6 kg)   SpO2 96%   BMI 26.72 kg/m²   Temp (24hrs), Av.7 °F (37.1 °C), Min:97.7 °F (36.5 °C), Max:99.3 °F (37.4 °C)    No results for input(s): POCGLU in the last 72 hours. I/O (24Hr): Intake/Output Summary (Last 24 hours) at 5/3/2021 1618  Last data filed at 2021 2000  Gross per 24 hour   Intake 940 ml   Output 550 ml   Net 390 ml       Labs:  Hematology:  Recent Labs     21  1148 21  1933 21  1555   WBC 9.5  --  7.0 9.2   RBC 3.44*  --  3.31* 3.02*   HGB 10.4*  --  10.0* 9.1*   HCT 32.4*  --  31.0* 28.1*   MCV 94.2  --  93.7 93.0   MCH 30.2  --  30.2 30.1   MCHC 32.1  --  32.3 32.4   RDW 14.1  --  13.7 13.7     --  152 166   MPV 9.6  --  10.0 10.7   CRP  --  175.3* 167.2*  --    INR 1.0  --   --   --      Chemistry:  Recent Labs     21  1148 21  0552 21  0439   * 129* 127*   K 3.7 3.9  --    CL 90* 91*  --    CO2 26 25  --    GLUCOSE 140* 202*  --    BUN 22 21  --    CREATININE 1.28* 1.20*  --    ANIONGAP 13 13  --    LABGLOM 39* 42*  --    GFRAA 48* 52*  --    CALCIUM 8.8 8.4*  --    TROPHS 21*  --   --      Recent Labs     21  1148   PROT 7.2   LABALBU 3.6   TSH 1.03   AST 30   ALT 13   ALKPHOS 64   BILITOT 0.31     Radiology:  CT Head Wo Contrast  Result Date: 2021  No evidence of acute intracranial process.      Xr Chest Portable  Result Date: 2021  No acute cardiopulmonary process. Physical Examination:        General appearance:  alert, cooperative and no distress  Mental Status:  oriented to person, place and time and normal affect  Lungs:  clear to auscultation bilaterally, normal effort  Heart:  regular rate and rhythm, no murmur  Abdomen:  soft, nontender, nondistended, normal bowel sounds   Extremities:  no edema, redness, tenderness in the calves  Skin:  no gross lesions, rashes, induration    Assessment:        Hospital Problems           Last Modified POA    * (Principal) Upper respiratory tract infection due to COVID-19 virus 5/2/2021 Yes    HTN (hypertension) (Chronic) 5/2/2021 Yes    Anxiety (Chronic) 5/2/2021 Yes    Depression (Chronic) 5/2/2021 Yes    Stage 3 chronic kidney disease 5/2/2021 Yes    GERD (gastroesophageal reflux disease) 5/2/2021 Yes    Fibromyalgia 5/2/2021 Yes          Plan:        -COVID-19 infection: continue  remdesivir and dexamethasone. Appreciate ID input. Nasal cannula O2 as needed to maintain O2 sats more than 95%. -Hyponatremia: Worsened. Will start on [email or web address]. Recheck CBC, BMP, Serum and urine osmolality now. Placed orders.  -Hypertension: Continue home dose of antihypertensives. -CKD stage III with baseline creatinine around 1.2-1.3 continue to monitor. -DVT/GI prophylaxis.      Marilin Lozano MD  5/3/2021

## 2021-05-04 LAB
ABSOLUTE EOS #: 0 K/UL (ref 0–0.44)
ABSOLUTE IMMATURE GRANULOCYTE: 0.14 K/UL (ref 0–0.3)
ABSOLUTE LYMPH #: 0.48 K/UL (ref 1.1–3.7)
ABSOLUTE MONO #: 0.76 K/UL (ref 0.1–1.2)
ANION GAP SERPL CALCULATED.3IONS-SCNC: 13 MMOL/L (ref 9–17)
BASOPHILS # BLD: 0 % (ref 0–2)
BASOPHILS ABSOLUTE: 0 K/UL (ref 0–0.2)
BUN BLDV-MCNC: 44 MG/DL (ref 8–23)
BUN/CREAT BLD: ABNORMAL (ref 9–20)
C-REACTIVE PROTEIN: 73.9 MG/L (ref 0–5)
CALCIUM SERPL-MCNC: 7.4 MG/DL (ref 8.6–10.4)
CHLORIDE BLD-SCNC: 98 MMOL/L (ref 98–107)
CO2: 25 MMOL/L (ref 20–31)
CREAT SERPL-MCNC: 1.4 MG/DL (ref 0.5–0.9)
DIFFERENTIAL TYPE: ABNORMAL
EOSINOPHILS RELATIVE PERCENT: 0 % (ref 1–4)
GFR AFRICAN AMERICAN: 43 ML/MIN
GFR NON-AFRICAN AMERICAN: 36 ML/MIN
GFR SERPL CREATININE-BSD FRML MDRD: ABNORMAL ML/MIN/{1.73_M2}
GFR SERPL CREATININE-BSD FRML MDRD: ABNORMAL ML/MIN/{1.73_M2}
GLUCOSE BLD-MCNC: 189 MG/DL (ref 70–99)
HCT VFR BLD CALC: 27.2 % (ref 36.3–47.1)
HEMOGLOBIN: 8.7 G/DL (ref 11.9–15.1)
IMMATURE GRANULOCYTES: 2 %
LYMPHOCYTES # BLD: 7 % (ref 24–43)
MCH RBC QN AUTO: 29.8 PG (ref 25.2–33.5)
MCHC RBC AUTO-ENTMCNC: 32 G/DL (ref 28.4–34.8)
MCV RBC AUTO: 93.2 FL (ref 82.6–102.9)
MONOCYTES # BLD: 11 % (ref 3–12)
MORPHOLOGY: NORMAL
NRBC AUTOMATED: 0 PER 100 WBC
PDW BLD-RTO: 13.6 % (ref 11.8–14.4)
PLATELET # BLD: 166 K/UL (ref 138–453)
PLATELET ESTIMATE: ABNORMAL
PMV BLD AUTO: 10.7 FL (ref 8.1–13.5)
POTASSIUM SERPL-SCNC: 4.1 MMOL/L (ref 3.7–5.3)
RBC # BLD: 2.92 M/UL (ref 3.95–5.11)
RBC # BLD: ABNORMAL 10*6/UL
SEG NEUTROPHILS: 80 % (ref 36–65)
SEGMENTED NEUTROPHILS ABSOLUTE COUNT: 5.52 K/UL (ref 1.5–8.1)
SODIUM BLD-SCNC: 136 MMOL/L (ref 135–144)
WBC # BLD: 6.9 K/UL (ref 3.5–11.3)
WBC # BLD: ABNORMAL 10*3/UL

## 2021-05-04 PROCEDURE — 94761 N-INVAS EAR/PLS OXIMETRY MLT: CPT

## 2021-05-04 PROCEDURE — 99233 SBSQ HOSP IP/OBS HIGH 50: CPT | Performed by: INTERNAL MEDICINE

## 2021-05-04 PROCEDURE — 2580000003 HC RX 258: Performed by: INTERNAL MEDICINE

## 2021-05-04 PROCEDURE — 97535 SELF CARE MNGMENT TRAINING: CPT

## 2021-05-04 PROCEDURE — 2500000003 HC RX 250 WO HCPCS: Performed by: NURSE PRACTITIONER

## 2021-05-04 PROCEDURE — 97530 THERAPEUTIC ACTIVITIES: CPT

## 2021-05-04 PROCEDURE — 80048 BASIC METABOLIC PNL TOTAL CA: CPT

## 2021-05-04 PROCEDURE — 6370000000 HC RX 637 (ALT 250 FOR IP): Performed by: NURSE PRACTITIONER

## 2021-05-04 PROCEDURE — 6370000000 HC RX 637 (ALT 250 FOR IP): Performed by: INTERNAL MEDICINE

## 2021-05-04 PROCEDURE — 36415 COLL VENOUS BLD VENIPUNCTURE: CPT

## 2021-05-04 PROCEDURE — 86140 C-REACTIVE PROTEIN: CPT

## 2021-05-04 PROCEDURE — 2580000003 HC RX 258: Performed by: NURSE PRACTITIONER

## 2021-05-04 PROCEDURE — 85025 COMPLETE CBC W/AUTO DIFF WBC: CPT

## 2021-05-04 PROCEDURE — 6360000002 HC RX W HCPCS: Performed by: INTERNAL MEDICINE

## 2021-05-04 PROCEDURE — 2700000000 HC OXYGEN THERAPY PER DAY

## 2021-05-04 PROCEDURE — APPSS30 APP SPLIT SHARED TIME 16-30 MINUTES: Performed by: NURSE PRACTITIONER

## 2021-05-04 PROCEDURE — 99232 SBSQ HOSP IP/OBS MODERATE 35: CPT | Performed by: INTERNAL MEDICINE

## 2021-05-04 PROCEDURE — 2060000000 HC ICU INTERMEDIATE R&B

## 2021-05-04 PROCEDURE — 97162 PT EVAL MOD COMPLEX 30 MIN: CPT

## 2021-05-04 RX ADMIN — REMDESIVIR 100 MG: 100 INJECTION, POWDER, LYOPHILIZED, FOR SOLUTION INTRAVENOUS at 12:04

## 2021-05-04 RX ADMIN — ENOXAPARIN SODIUM 30 MG: 30 INJECTION SUBCUTANEOUS at 09:36

## 2021-05-04 RX ADMIN — SODIUM CHLORIDE, PRESERVATIVE FREE 10 ML: 5 INJECTION INTRAVENOUS at 21:00

## 2021-05-04 RX ADMIN — AMLODIPINE BESYLATE 5 MG: 5 TABLET ORAL at 09:36

## 2021-05-04 RX ADMIN — CETIRIZINE HYDROCHLORIDE 10 MG: 10 TABLET ORAL at 09:36

## 2021-05-04 RX ADMIN — FLUTICASONE PROPIONATE 2 SPRAY: 50 SPRAY, METERED NASAL at 09:37

## 2021-05-04 RX ADMIN — DEXAMETHASONE 6 MG: 4 TABLET ORAL at 09:36

## 2021-05-04 NOTE — PROGRESS NOTES
[facility]  Office: [address], DO, Jos Burt, DO, Nury Freddy, DO, Kyler Horse Blood, DO, Estefany Hampton MD, Alexander Mack MD, Divina Reagan MD, Socorro Brennan MD, Alli Javed MD, Oral Harris MD, Yelena Velazquez MD, Maude Maria MD, Horace Henley, DO, Benedicto Neal MD, Yumiko Lares, DO, Cordelia Barthel, MD,  Greg Nesbitt, DO, Augustina Fatima MD, Mauricio Newell MD, Chaitanya Dunlap MD, Eloise Rao MD, Rios Dawkins, CNP, Jonna Esquivel, CNP, Lux Landry, CNS, Emma Acosta, CNP, Vinay Griffin, CNP, Janiya Gusman, CNP, Cesar Guevara, CNP, Iliana Urrutia, CNP, Erinn Ferrari, PA-C, Carol Ann Cardoza, [facility], Hira Landers, CNP, Murtaza Tenorio, CNP, Jay Angel, CNP, Irma Hernandez, CNP, Sandy Novoa, CNP, Adarsh Gutierrez, [address] [place] [address]    Progress Note    Name:   Ronald Cancino  MRN:     [id number]     [place]:      [de-identified]   Room:   [address] [place] [address]-[facility] Day:  3  Admit Date:  5/1/2021 11:39 AM    PCP:   Aubry Cowden, MD  Code Status:  DNR-CCA  Subjective:     C/C:   Chief Complaint   Patient presents with   [place] Fatigue    Chills    Extremity Weakness     bilateral legs    Fever     100.4 oral     Interval History Status: not changed. Patient reports she is doing slightly better but not that much. Still having little appetite. Per RN was able to walk to the bathroom with help. Brief History:   80year-old with prior history of anxiety depression fibromyalgia GERD hypertension hyperlipoidemia present to the ED with complaints of fatigue lower extremity venous fever and dry coughx 5days. Was treated with 5 days of oral antibiotic for possible sinus infection about a week ago.   ED work-up she was found to be febrile 100.4 tachypneic respiratory rate 33, elevated blood pressure 163/86, hyponatremic sodium of 129, creatinine 1.29 , Covid rapid test positive, chest x-ray was Problem Relation Age of Onset    Kidney Disease Sister     Stroke Other     Diabetes Other        Vitals:  BP (!) 115/49   Pulse 86   Temp 98.6 °F (37 °C) (Oral)   Resp 23   Ht 5' 4\" (1.626 m)   Wt 154 lb 15.7 oz (70.3 kg)   SpO2 92%   BMI 26.60 kg/m²   Temp (24hrs), Av.6 °F (37 °C), Min:98.2 °F (36.8 °C), Max:99 °F (37.2 °C)    No results for input(s): POCGLU in the last 72 hours. I/O (24Hr): Intake/Output Summary (Last 24 hours) at 2021 0931  Last data filed at 2021 0557  Gross per 24 hour   Intake 2472 ml   Output 1400 ml   Net 1072 ml       Labs:  Hematology:  Recent Labs     21  11421  11421  0552 21  15521  0549   WBC 9.5  --  7.0 9.2 6.9   RBC 3.44*  --  3.31* 3.02* 2.92*   HGB 10.4*  --  10.0* 9.1* 8.7*   HCT 32.4*  --  31.0* 28.1* 27.2*   MCV 94.2  --  93.7 93.0 93.2   MCH 30.2  --  30.2 30.1 29.8   MCHC 32.1  --  32.3 32.4 32.0   RDW 14.1  --  13.7 13.7 13.6     --  152 166 166   MPV 9.6  --  10.0 10.7 10.7   CRP  --    < > 167.2* 93.9* 73.9*   INR 1.0  --   --   --   --     < > = values in this interval not displayed. Chemistry:  Recent Labs     21  11421  0552 21  0552 21  1555 21  1812 21  0549   * 129*   < > 127* 127* 136   K 3.7 3.9  --  3.8  --  4.1   CL 90* 91*  --  90*  --  98   CO2 26 25  --  22  --  25   GLUCOSE 140* 202*  --  221*  --  189*   BUN 22 21  --  43*  --  44*   CREATININE 1.28* 1.20*  --  1.47*  --  1.40*   ANIONGAP 13 13  --  15  --  13   LABGLOM 39* 42*  --  34*  --  36*   GFRAA 48* 52*  --  41*  --  43*   CALCIUM 8.8 8.4*  --  7.7*  --  7.4*   TROPHS 21*  --   --   --   --   --     < > = values in this interval not displayed.      Recent Labs     21  1148 21  1555   PROT 7.2 6.1*   LABALBU 3.6 3.0*   TSH 1.03  --    AST 30 61*   ALT 13 32   ALKPHOS 64 52   BILITOT 0.31 0.16*     Radiology:  CT Head Wo Contrast  Result Date: 2021  No evidence of acute

## 2021-05-04 NOTE — PROGRESS NOTES
Physical Therapy    Facility/Department: [facility] 3  Initial Assessment    NAME: Alba Jaramillo  : 1934  MRN: [id number]    Date of Service: 2021  This is a 45-year-old female with past medical history of anxiety, depression, fibromyalgia, GERD, hypertension, hyperlipidemia is coming in with complains of fatigue, lower extremity weakness, fever, cough. Patient reported that she had sinus infection about a week ago for which she had some prescription called in. Patient also complains of having trouble with urination. Patient complained of being weak to the point where she has been having trouble getting off her couch. Patient also complained of increased urinary incontinence. Patient complains of right leg numbness and swelling. In the emergency department patient was noted to be febrile 100.4 tachypneic with highest respiratory rate being 33 and hypertensive with blood pressure being elevated at 163/86. Pt found to be COVID-19 positive. Discharge Recommendations:  Further therapy recommended at discharge. PT Equipment Recommendations  Equipment Needed: No    Assessment    Pt cooperative, motivated; had just worked with OT so was tired, but agreeable to participate with PT evaluation. Frequent lengthy rest breaks needed d/t SOB and dizziness. Pt has difficulty getting out of a chair and was unable to do so without assistance. Recommend short term rehab prior to DC home to improve function, endurance and independence. Body structures, Functions, Activity limitations: Decreased functional mobility ; Decreased endurance;Decreased balance  Prognosis: Good  Decision Making: Medium Complexity  PT Education: Goals;PT Role;Plan of Care  REQUIRES PT FOLLOW UP: Yes  Activity Tolerance  Activity Tolerance: Patient limited by fatigue;Patient limited by endurance  Activity Tolerance: (Pt required frequent lengthy rest breaks d/t fatigue and SOB)       Patient Diagnosis(es): The encounter diagnosis was COVID-19.     has a past medical history of Anemia, Anxiety, Depression, Fibromyalgia, Gastritis, GERD (gastroesophageal reflux disease), Hyperlipidemia, Hypertension, Lumbar disc disease, Neuropathy, Numbness, Osteoarthritis, Watermelon stomach, and Wears glasses. has a past surgical history that includes Cholecystectomy; nasal endoscopy; Hemorrhoid surgery; back surgery (6/17/13); Cataract removal; Upper gastrointestinal endoscopy; and pr esophagogastroduodenoscopy transoral diagnostic (N/A, 5/9/2017).     Restrictions  Restrictions/Precautions  Restrictions/Precautions: Isolation, Fall Risk, Up as Tolerated, General Precautions(droplet plus precautions)  Required Braces or Orthoses?: No  Position Activity Restriction  Other position/activity restrictions: 3L O2  Vision/Hearing  Vision: Impaired  Vision Exceptions: Wears glasses at all times  Hearing: Within functional limits     Subjective  General  Patient assessed for rehabilitation services?: Yes  Response To Previous Treatment: Not applicable  Family / Caregiver Present: No  Follows Commands: Within Functional Limits  Pain Screening  Patient Currently in Pain: Denies  Vital Signs  BP after ambulating with rwalker in room: 125/51; O2 sats initially 88%, quickly improved to 94% on nasal canula  BP after ambulating without device: 116/65; O2 sats 94%       Orientation  Orientation  Overall Orientation Status: Within Normal Limits  Social/Functional History  Social/Functional History  Lives With: Spouse  Type of Home: House  Home Layout: One level  Home Access: Stairs to enter without rails  Entrance Stairs - Number of Steps: 1  Bathroom Shower/Tub: Walk-in shower, Tub/Shower unit, Doors(pt reports typically uses walk-in shower)  Bathroom Toilet: Standard  Bathroom Equipment: Grab bars in shower, Shower chair  Home Equipment: Cane, Rolling walker  ADL Assistance: Independent  Homemaking Assistance: Independent  Homemaking Responsibilities: Yes  Ambulation Assistance: Independent  Transfer Assistance: Independent  Active : No  Patient's  Info:   Mode of Transportation: SUV  Occupation: Retired  Leisure & Hobbies: pt enjoys playing [employer] in the [employer], doing puzzles, spending time with family  Additional Comments: Pt reports increased difficulty with ADL/IADL tasks recently d/t generalized weakness and fatigue    Objective     Observation/Palpation  Posture: Good    AROM RLE (degrees)  RLE AROM: WFL  AROM LLE (degrees)  LLE AROM : WFL  AROM RUE (degrees)  RUE AROM : WFL  AROM LUE (degrees)  LUE AROM : WFL  Strength RLE  Strength RLE: WFL  Strength LLE  Strength LLE: WFL  Strength RUE  Strength RUE: WFL  Strength LUE  Strength LUE: WFL  Tone RLE  RLE Tone: Normotonic  Tone LLE  LLE Tone: Normotonic  Motor Control  Gross Motor?: WFL  Sensation  Overall Sensation Status: Impaired  Bed mobility  Comment: pt up in chair upon PT arrival and retired to chair at end of PT session  Transfers  Sit to Stand: Minimal Assistance  Stand to sit: Minimal Assistance  Stand Pivot Transfers: Minimal Assistance  Ambulation  Ambulation?: Yes  More Ambulation?: Yes  Ambulation 1  Surface: level tile  Device: Rolling Walker  Other Apparatus: O2  Assistance: Contact guard assistance  Gait Deviations: Slow Lauren; Increased JCARLOS; Decreased step length;Decreased step height;Decreased arm swing;Decreased head and trunk rotation  Distance: 40'x1 in room  Ambulation 2  Surface - 2: level tile  Device 2: No device  Other Apparatus 2: O2  Assistance 2: Minimal assistance  Quality of Gait 2: wide JCARLOS, choppy steps; moves cautiously  Gait Deviations: Slow Lauren; Increased JCARLOS; Decreased step length;Decreased arm swing;Decreased head and trunk rotation  Distance: 40'x1  Stairs/Curb  Stairs?: No     Balance  Posture: Good  Sitting - Static: Good  Sitting - Dynamic: Good  Standing - Static: Fair  Standing - Dynamic: Fair;-        Plan   Plan  Times per week: 5-6 visits weekly Times per day: Daily  Current Treatment Recommendations: Strengthening, ROM, Balance Training, Functional Mobility Training, Transfer Training, Endurance Training, Gait Training, Stair training, Home Exercise Program, Safety Education & Training, Patient/Caregiver Education & Training, Equipment Evaluation, Education, & procurement  Safety Devices  Type of devices: Call light within reach, Chair alarm in place, Gait belt, Patient at risk for falls, Left in chair  Restraints  Initially in place: No    AM-PAC Score  AM-PAC Inpatient Mobility Raw Score : 15 (05/04/21 1117)  AM-PAC Inpatient T-Scale Score : 39.45 (05/04/21 1117)  Mobility Inpatient CMS 0-100% Score: 57.7 (05/04/21 1117)  Mobility Inpatient CMS G-Code Modifier : CK (05/04/21 1117)          Goals  Short term goals  Time Frame for Short term goals: 12 visits  Short term goal 1: independent bed mobility without use of bed controls  Short term goal 2: independent transfers  Short term goal 3: independent gait with appropriate device vs none x 200'  Short term goal 4: independent stair ambulation x3 steps, no HR  Patient Goals   Patient goals : get stronger, walk better, less tired       Therapy Time   Individual Concurrent Group Co-treatment   Time In 1025         Time Out 1112         Minutes 47                 Nunu Ash PT

## 2021-05-04 NOTE — PROGRESS NOTES
[facility] - Progress Note   COVID 19 Patient  Today's Date and Time: 5/4/2021, 9:39 AM    Impression :   · COVID 19 Suspect  · COVID 19 Confirmed Infection  · Covid tests:  · 5/1/2021 positive  · Hyponatremia-resolved  · ROQUE  · Elevated inflammatory markers  · Anemia  Recommendations:   · Monitor off antibiotics  · Clinical Research will approach patient to explore if he qualifies for any of the COVID 19 treatment protocols. · Remdesivir started 5/2/21 due to elevated CRP-will monitor renal and liver function   · Decadron started 5/1/2021  · Trend CRP    Medical Decision Making/Summary/Discussion:5/4/2021     · Patient admitted with suspected COVID 19 infection  · Covid test confirmed positive. Infection Control Recommendations   · Universal Precautions  · Airborne isolation  · Droplet Isolation    Antimicrobial Stewardship Recommendations     · Discontinuation of therapy  Coordination of Outpatient Care:   · Estimated Length of IV antimicrobials:TBD  · Patient will need Midline Catheter Insertion: TBD  · Patient will need PICC line Insertion: No  · Patient will need: Home IV , [place],  SNF,  LTAC:TBD  · Patient will need outpatient wound care:No    Chief complaint/reason for consultation:   · Concern for COVID infection      History of Present Illness:   Courtney Ch is a 80y.o.-year-old  female who was initially admitted on 5/1/2021. Patient seen at the request of Terry Myers. INITIAL HISTORY:    Patient presented through ER with complaints of having diffuse weakness particularly of the lower extremities to the point that she could not get up, fatigue, fevers, dry cough. He reported a prior sinus infection about a week prior to her admission. She also reported some urinary incontinence with dribbling. Her evaluation in the emergency room did not show any pulmonary involvement by the chest x-ray.   However her COVID-19 test was positive on 5/1/2021 and she was found to be hyponatremic    Patient admitted because of concerns with COVID 19.    CURRENT EVALUATION : 5/4/2021    Afebrile  VS stable    The patient is alert and oriented on 1-->2-->3.5 L NC. Due to very elevated CRP, Remdesivir was initiated on 5/2/21  Decadron was started on 5/1/21  CRP is trending down    Discussed with RN     Patient exhibiting respiratory distress. With exertion  Respiratory secretions: None    Patient receiving supplemental oxygen. 2-->1-->3.5L nasal cannula   02 sat 94-->97-->95  RR 25-->17-->25-->22      % FIO2:   PEEP:      QTc:       NEWS Score: 0-4 Low risk group; 5-6: Medium risk group; 7 or above: High risk group  Parameters 3 2 1 0 1 2 3   Age    < 65   ? 65   RR ? 8  9-11 12-20  21-24 ? 25   O2 Sats ? 91 92-93 94-95 ? 96      Suppl O2  Yes  No      SBP ? 90  101-110 111-219   ? 220   HR ? 40  41-50 51-90  111-130 ? 131   Consciousness    Alert   Drowsiness, lethargy, or confusion   Temperature ? 35.0 C (95.0 F)  35.1-36.0 C 95.1-96.9 F 36.1-38.0 C 97.0-100.4 F 38.1-39.0 C 100.5-102.3 F ? 39.1 C ? 102.4 F      NEWS Score:   5/1/2021: 11 high risk    Overall Daily Picture:    Unchanged    Presence of secondary bacterial Infection:  No   Additional antibiotics: No    Labs, X rays reviewed: 5/4/2021    BUN: 22-->21-->44  Cr: 1.28-->1.20-->1.47-->1.40  Sodium 129-->129-->136    WBC: 9.5-->7.0-->6.9  Hb: 10.4-->10.0-->8.7  Plat: 177-->152    Absolute Neutrophils: 7.1  Absolute Lymphocytes: 1.14  Neutrophil/Lymphocyte Ratio: 6.22 high risk    CRP:175.3-->167.2-->93.9-->73.9  Ferritin:  LDH:     Pro Calcitonin:  Troponin high-sensitivity 21      Cultures:  Urine:  ·   Blood:  · 5/1/2021 2 days no growth  Sputum :  ·   Wound:       CXR:   · 5/1/2021 no pulmonary involvement by chest x-ray  CAT:      Discussed with patient, RN, CC, IM.     I have personally reviewed the past medical history, past surgical history, medications, social history, and family history, and I have updated the database accordingly.   Past Medical History:     Past Medical History:   Diagnosis Date    Anemia     Anxiety     anxiety, depression    Depression     Fibromyalgia     Gastritis     GERD (gastroesophageal reflux disease)     Hyperlipidemia     Hypertension     Lumbar disc disease     LOW BACK PAIN INTERMITTENT/STATES LARGE BULGE    Neuropathy     left leg numbness    Numbness     LEFT LEG/KNEE TO ANKLE/WEAK LEG-USES WALKER     Osteoarthritis     Watermelon stomach 02/2016    Wears glasses        Past Surgical  History:     Past Surgical History:   Procedure Laterality Date    BACK SURGERY  6/17/13    micro [id number]    CATARACT REMOVAL      wesly cataract    CHOLECYSTECTOMY      HEMORRHOID SURGERY      NASAL ENDOSCOPY      GA ESOPHAGOGASTRODUODENOSCOPY TRANSORAL DIAGNOSTIC N/A 5/9/2017    EGD ESOPHAGOGASTRODUODENOSCOPY WITH APC performed by Ezekiel Sesay [facility] at [facility]    UPPER GASTROINTESTINAL ENDOSCOPY         Medications:      remdesivir IVPB  100 mg Intravenous Q24H    fluticasone  2 spray Nasal Daily    cetirizine  10 mg Oral Daily    sodium chloride flush  5-40 mL Intravenous 2 times per day    enoxaparin  30 mg Subcutaneous Daily    dexamethasone  6 mg Oral Daily    amLODIPine  5 mg Oral Daily       Social History:     Social History     Socioeconomic History    Marital status:      Spouse name: Not on file    Number of children: Not on file    Years of education: Not on file    Highest education level: Not on file   Occupational History    Not on file   Social Needs    Financial resource strain: Not on file    Food insecurity     Worry: Not on file     Inability: Not on file    Transportation needs     Medical: Not on file     Non-medical: Not on file   Tobacco Use    Smoking status: Former Smoker    Smokeless tobacco: Never Used    Tobacco comment: QUIT OVER 50 YRS AGO  6/17/13  Denies changes   Substance and Sexual Activity    Alcohol use: Yes     Comment: RARE DRINK    Drug use: No    Sexual activity: Not on file   Lifestyle    Physical activity     Days per week: Not on file     Minutes per session: Not on file    Stress: Not on file   Relationships    Social connections     Talks on phone: Not on file     Gets together: Not on file     Attends Yazidi service: Not on file     Active member of club or organization: Not on file     Attends meetings of clubs or organizations: Not on file     Relationship status: Not on file    Intimate partner violence     Fear of current or ex partner: Not on file     Emotionally abused: Not on file     Physically abused: Not on file     Forced sexual activity: Not on file   Other Topics Concern    Not on file   Social History Narrative    Not on file       Family History:     Family History   Problem Relation Age of Onset    Kidney Disease Sister     Stroke Other     Diabetes Other         Allergies:   Augmentin [amoxicillin-pot clavulanate], Biaxin [clarithromycin], Amoxicillin, and Ciprofloxacin     Review of Systems:       Constitutional: No fevers or chills. No systemic complaints  Head: No headaches  Eyes: No double vision or blurry vision. No conjunctival inflammation. ENT: No sore throat or runny nose. . No hearing loss, tinnitus or vertigo. Cardiovascular: No chest pain or palpitations. No Shortness of breath. No CALZADA  Lung: No Shortness of breath or cough. No sputum production  Abdomen: No nausea, vomiting, diarrhea, or abdominal pain. Rodolfo Gus No cramps. Genitourinary: No increased urinary frequency, or dysuria. No hematuria. No suprapubic or CVA pain  Musculoskeletal: No muscle aches or pains. No joint effusions, swelling or deformities  Hematologic: No bleeding or bruising. Neurologic: No headache, weakness, numbness, or tingling. Integument: No rash, no ulcers. Psychiatric: No depression. Endocrine: No polyuria, no polydipsia, no polyphagia.     Physical Examination : Patient Vitals for the past 8 hrs:   BP Temp Temp src Pulse Resp SpO2 Weight   05/04/21 0915 (!) 115/49         05/04/21 0545       154 lb 15.7 oz (70.3 kg)   05/04/21 0315 (!) 128/55 98.6 °F (37 °C) Oral 86 23 92 %      General Appearance: Awake, alert, and in no apparent distress  Head:  Normocephalic, no trauma  Eyes: Pupils equal, round, reactive to light; sclera anicteric; conjunctivae pink. No embolic phenomena. ENT: Oropharynx clear, without erythema, exudate, or thrush. No tenderness of sinuses. Mouth/throat: mucosa pink and moist. No lesions. Dentition in good repair. Neck:Supple, without lymphadenopathy. Thyroid normal, No bruits. Pulmonary/Chest: Clear to auscultation, without wheezes, rales, or rhonchi. No dullness to percussion. Cardiovascular: Regular rate and rhythm without murmurs, rubs, or gallops. Abdomen: Soft, non tender. Bowel sounds normal. No organomegaly  All four Extremities: No cyanosis, clubbing, edema, or effusions. Neurologic: No gross sensory or motor deficits. Skin: Warm and dry with good turgor. No signs of peripheral arterial or venous insufficiency. No ulcerations. No open wounds. Medical Decision Making -Laboratory:   I have independently reviewed/ordered the following labs:    CBC with Differential:   Recent Labs     05/03/21  1555 05/04/21  0549   WBC 9.2 6.9   HGB 9.1* 8.7*   HCT 28.1* 27.2*    166   LYMPHOPCT 5* 7*   MONOPCT 7 11     BMP:   Recent Labs     05/03/21  1555 05/03/21  1812 05/04/21  0549   * 127* 136   K 3.8  --  4.1   CL 90*  --  98   CO2 22  --  25   BUN 43*  --  44*   CREATININE 1.47*  --  1.40*     Hepatic Function Panel:   Recent Labs     05/01/21  1148 05/03/21  1555   PROT 7.2 6.1*   LABALBU 3.6 3.0*   BILITOT 0.31 0.16*   ALKPHOS 64 49   ALT 13 32   AST 30 61*     No results for input(s): RPR in the last 72 hours. No results for input(s): HIV in the last 72 hours. No results for input(s): BC in the last 72 hours. Lab Results   Component Value Date    MUCUS NOT REPORTED 05/01/2021    RBC 2.92 05/04/2021    TRICHOMONAS NOT REPORTED 05/01/2021    WBC 6.9 05/04/2021    YEAST NOT REPORTED 05/01/2021    TURBIDITY CLEAR 05/01/2021     Lab Results   Component Value Date    CREATININE 1.40 05/04/2021    GLUCOSE 189 05/04/2021       Medical Decision Making-Imaging:     EXAMINATION:   ONE XRAY VIEW OF THE CHEST       5/1/2021 12:02 pm       COMPARISON:   Chest radiograph performed 09/22/2020.       HISTORY:   ORDERING SYSTEM PROVIDED HISTORY: concern for tachycardia, fatigue   TECHNOLOGIST PROVIDED HISTORY:   concern for tachycardia, fatigue       FINDINGS:   There is no acute consolidation or effusion.  There is no pneumothorax.  The   mediastinal structures are unremarkable.  The upper abdomen is unremarkable. The extrathoracic soft tissues are unremarkable. Chet Motto is no acute osseous   abnormality.           Impression   No acute cardiopulmonary process. EXAMINATION:   CT OF THE HEAD WITHOUT CONTRAST  5/1/2021 2:00 pm       TECHNIQUE:   CT of the head was performed without the administration of intravenous   contrast. Dose modulation, iterative reconstruction, and/or weight based   adjustment of the mA/kV was utilized to reduce the radiation dose to as low   as reasonably achievable.       COMPARISON:   January 6, 2020.       HISTORY:   ORDERING SYSTEM PROVIDED HISTORY: concern for fall off of toilet a couple of   weeks ago   TECHNOLOGIST PROVIDED HISTORY:       concern for fall off of toilet a couple of weeks ago   Decision Support Exception - unselect if not a suspected or confirmed   emergency medical condition->Emergency Medical Condition (MA)       FINDINGS:   BRAIN/VENTRICLES: The gyri and sulci have a normal appearance. There is mild   cortical and cerebellar volume loss with associated ex vacuo ventricular   dilation.  Mild diffuse periventricular and subcortical deep white matter   hypoattenuation noted, findings compatible with chronic small vessel ischemic   disease.  A mild degree of encephalomalacia within the left parietal and   temporal lobe regions is unchanged.  The gray-white matter differentiation is   otherwise preserved throughout. Rickford Math is no acute hemorrhage, mass, or mass   effect.  No evidence of acute territorial infarct.  No abnormal extraaxial   fluid collections.       ORBITS:  The visualized portion of the orbits demonstrate no acute   abnormality.       SINUSES:  The mastoid air cells are normally aerated.  The visualized   paranasal sinuses are grossly clear.       SOFT TISSUES/SKULL:  No significant abnormality of the visualized skull or   soft tissues. No acute fracture. No scalp hematoma.           Impression   No evidence of acute intracranial process. Medical Decision [id number]:       Medical Decision Making-Other:     Note:  · Labs, medications, radiologic studies were reviewed with personal review of films  · Large amounts of data were reviewed  · Discussed with nursing Staff, Discharge planner  · Infection Control and Prevention measures reviewed  · All prior entries were reviewed  · Administer medications as ordered  · Prognosis: Guarded  · Discharge planning reviewed  · Follow up as outpatient. Thank you for allowing us to participate in the care of this patient. Please call with questions. GUERRERO Rome - CNP     ATTESTATION:    I have discussed the case, including pertinent history and exam findings with the APRN. I have evaluated the  History, physical findings and pictures of the patient and the key elements of the encounter have been performed by me. I have reviewed the laboratory data, other diagnostic studies and discussed them with the APRN. I have updated the medical record where necessary. I agree with the assessment, plan and orders as documented by the APRN.     Bridget Srivastava MD.          Pager: [phone] - Office: [phone]

## 2021-05-04 NOTE — PROGRESS NOTES
Occupational Therapy  Facility/Department: [facility] CAR 3  Daily Treatment Note  NAME: Abdirashid Velasquez  : 1934  MRN: [id number]    Date of Service: 2021    Discharge Recommendations:  Patient would benefit from continued therapy after discharge   Ed on OT services, ADLs, bed mob, orientation review, EC/WS, fall prevention tips-good return       Assessment   Performance deficits / Impairments: Decreased functional mobility ; Decreased ADL status; Decreased strength;Decreased endurance;Decreased balance;Decreased high-level IADLs  Prognosis: Good  REQUIRES OT FOLLOW UP: Yes  Activity Tolerance  Activity Tolerance: Patient Tolerated treatment well  Safety Devices  Safety Devices in place: Yes  Type of devices: All fall risk precautions in place;Call light within reach; Chair alarm in place; Left in chair;Nurse notified         Patient Diagnosis(es): The encounter diagnosis was COVID-19.      has a past medical history of Anemia, Anxiety, Depression, Fibromyalgia, Gastritis, GERD (gastroesophageal reflux disease), Hyperlipidemia, Hypertension, Lumbar disc disease, Neuropathy, Numbness, Osteoarthritis, Watermelon stomach, and Wears glasses. has a past surgical history that includes Cholecystectomy; nasal endoscopy; Hemorrhoid surgery; back surgery (13); Cataract removal; Upper gastrointestinal endoscopy; and pr esophagogastroduodenoscopy transoral diagnostic (N/A, 2017). Restrictions  Restrictions/Precautions  Restrictions/Precautions: Isolation, Fall Risk, Up as Tolerated  Required Braces or Orthoses?: No  Position Activity Restriction  Other position/activity restrictions: up with assist, 3L O2  Subjective   General  Patient assessed for rehabilitation services?: Yes  Family / Caregiver Present: No    Vital Signs  Patient Currently in Pain: No   Orientation  Orientation  Overall Orientation Status: Within Functional Limits  Objective    Pt in bed upon arrival and was eating.  Pt demo bed mob and sat on EOB to eat breakfast before transferring to the bathroom to complete ADLs. Pt slightly unsteady and required seated rest breaks during ADL completion and transfers. Pt stood ~ 5 min to complete functional transfer and LB bathing standing at the sink. P[t retired to chair and PT arrived to take over care. PT brought in a chair alarm per writer request that pt needs chair alarm placed. Pt required more assistance w/ transfers d/t decreased strength and balance. ADL  Feeding: Modified independent ;Dentures; Increased time to complete(seated on EOB)  Grooming: Setup;Supervision; Increased time to complete(seated at the sink)  UB bathing: stand by assistance seated in chair at sink  LB bathing: dynamic standing at sink w/ CGA  UB dressing: set up and stand by assistance to don/doff gown  Toileting: Increased time to complete;Contact guard assistance;Supervision        Balance  Sitting Balance: Supervision(seated on EOB and at the sink)  Standing Balance: Contact guard assistance  Toilet Transfers  Toilet - Technique: Ambulating  Equipment Used: Grab bars  Toilet Transfer: Contact guard assistance  Toilet Transfers Comments: pt supervision for toilet hygiene(pt used bathroom grab bars for support/safety)  Bed mobility  Supine to Sit: Stand by assistance  Scooting: Stand by assistance  Comment: HOB elevated and pt used bed rails to assist in bed mob  Transfers  Stand Step Transfers: Minimal assistance(hand held assistance)  Sit to stand: Contact guard assistance  Stand to sit: Contact guard assistance  Transfer Comments:  writer provided hand held assistance  Attendance  Participation: Active participation          Plan   Plan  Times per week: 3-5 x/wk  Current Treatment Recommendations: Balance Training, Functional Mobility Training, Endurance Training, Cognitive Reorientation, Safety Education & Training, Patient/Caregiver Education & Training, Equipment Evaluation, Education, & procurement, Self-Care / ADL, Home Management Training    Goals  Short term goals  Time Frame for Short term goals: By discharge, pt will:  Short term goal 1: Demo Mod I for functional transfers and functional mobility with use of LRD  Short term goal 2: Demo I with setup for UB ADLs and grooming tasks  Short term goal 3: Demo SBA with setup for LB ADLs and toileting tasks  Short term goal 4: Demo 10+ minutes dynamic standing task to increase balance for independence with ADLs/IADLs  Short term goal 5: Demo good safety awareness with 0 VCs throughout therapy session       Therapy Time   Individual Concurrent Group Co-treatment   Time In  9:35         Time Out  10:31         Minutes  56             time code min:56 min    HECTOR Montesinos/BELA

## 2021-05-05 LAB
ABSOLUTE EOS #: 0 K/UL (ref 0–0.4)
ABSOLUTE IMMATURE GRANULOCYTE: 0.15 K/UL (ref 0–0.3)
ABSOLUTE LYMPH #: 0.6 K/UL (ref 1–4.8)
ABSOLUTE MONO #: 0.45 K/UL (ref 0.1–0.8)
ANION GAP SERPL CALCULATED.3IONS-SCNC: 11 MMOL/L (ref 9–17)
BASOPHILS # BLD: 0 % (ref 0–2)
BASOPHILS ABSOLUTE: 0 K/UL (ref 0–0.2)
BUN BLDV-MCNC: 41 MG/DL (ref 8–23)
BUN/CREAT BLD: ABNORMAL (ref 9–20)
CALCIUM SERPL-MCNC: 7.8 MG/DL (ref 8.6–10.4)
CHLORIDE BLD-SCNC: 99 MMOL/L (ref 98–107)
CO2: 23 MMOL/L (ref 20–31)
CREAT SERPL-MCNC: 1.27 MG/DL (ref 0.5–0.9)
DIFFERENTIAL TYPE: ABNORMAL
EOSINOPHILS RELATIVE PERCENT: 0 % (ref 1–4)
GFR AFRICAN AMERICAN: 48 ML/MIN
GFR NON-AFRICAN AMERICAN: 40 ML/MIN
GFR SERPL CREATININE-BSD FRML MDRD: ABNORMAL ML/MIN/{1.73_M2}
GFR SERPL CREATININE-BSD FRML MDRD: ABNORMAL ML/MIN/{1.73_M2}
GLUCOSE BLD-MCNC: 209 MG/DL (ref 70–99)
HCT VFR BLD CALC: 27.3 % (ref 36.3–47.1)
HEMOGLOBIN: 8.8 G/DL (ref 11.9–15.1)
IMMATURE GRANULOCYTES: 2 %
LYMPHOCYTES # BLD: 8 % (ref 24–44)
MCH RBC QN AUTO: 29.9 PG (ref 25.2–33.5)
MCHC RBC AUTO-ENTMCNC: 32.2 G/DL (ref 28.4–34.8)
MCV RBC AUTO: 92.9 FL (ref 82.6–102.9)
MONOCYTES # BLD: 6 % (ref 1–7)
MORPHOLOGY: NORMAL
NRBC AUTOMATED: 0 PER 100 WBC
PDW BLD-RTO: 13.6 % (ref 11.8–14.4)
PLATELET # BLD: 192 K/UL (ref 138–453)
PLATELET ESTIMATE: ABNORMAL
PMV BLD AUTO: 11 FL (ref 8.1–13.5)
POTASSIUM SERPL-SCNC: 3.8 MMOL/L (ref 3.7–5.3)
RBC # BLD: 2.94 M/UL (ref 3.95–5.11)
RBC # BLD: ABNORMAL 10*6/UL
SEG NEUTROPHILS: 84 % (ref 36–66)
SEGMENTED NEUTROPHILS ABSOLUTE COUNT: 6.3 K/UL (ref 1.8–7.7)
SODIUM BLD-SCNC: 133 MMOL/L (ref 135–144)
WBC # BLD: 7.5 K/UL (ref 3.5–11.3)
WBC # BLD: ABNORMAL 10*3/UL

## 2021-05-05 PROCEDURE — 36415 COLL VENOUS BLD VENIPUNCTURE: CPT

## 2021-05-05 PROCEDURE — 6370000000 HC RX 637 (ALT 250 FOR IP): Performed by: NURSE PRACTITIONER

## 2021-05-05 PROCEDURE — 99232 SBSQ HOSP IP/OBS MODERATE 35: CPT | Performed by: INTERNAL MEDICINE

## 2021-05-05 PROCEDURE — 6370000000 HC RX 637 (ALT 250 FOR IP): Performed by: INTERNAL MEDICINE

## 2021-05-05 PROCEDURE — 97110 THERAPEUTIC EXERCISES: CPT

## 2021-05-05 PROCEDURE — 85025 COMPLETE CBC W/AUTO DIFF WBC: CPT

## 2021-05-05 PROCEDURE — 6360000002 HC RX W HCPCS: Performed by: INTERNAL MEDICINE

## 2021-05-05 PROCEDURE — 2580000003 HC RX 258: Performed by: INTERNAL MEDICINE

## 2021-05-05 PROCEDURE — 99233 SBSQ HOSP IP/OBS HIGH 50: CPT | Performed by: INTERNAL MEDICINE

## 2021-05-05 PROCEDURE — 2500000003 HC RX 250 WO HCPCS: Performed by: NURSE PRACTITIONER

## 2021-05-05 PROCEDURE — APPSS30 APP SPLIT SHARED TIME 16-30 MINUTES: Performed by: NURSE PRACTITIONER

## 2021-05-05 PROCEDURE — 97530 THERAPEUTIC ACTIVITIES: CPT

## 2021-05-05 PROCEDURE — 80048 BASIC METABOLIC PNL TOTAL CA: CPT

## 2021-05-05 PROCEDURE — 97116 GAIT TRAINING THERAPY: CPT

## 2021-05-05 PROCEDURE — 2060000000 HC ICU INTERMEDIATE R&B

## 2021-05-05 PROCEDURE — 97535 SELF CARE MNGMENT TRAINING: CPT

## 2021-05-05 PROCEDURE — 2580000003 HC RX 258: Performed by: NURSE PRACTITIONER

## 2021-05-05 RX ADMIN — DEXAMETHASONE 6 MG: 4 TABLET ORAL at 08:53

## 2021-05-05 RX ADMIN — REMDESIVIR 100 MG: 100 INJECTION, POWDER, LYOPHILIZED, FOR SOLUTION INTRAVENOUS at 11:51

## 2021-05-05 RX ADMIN — SODIUM CHLORIDE, PRESERVATIVE FREE 10 ML: 5 INJECTION INTRAVENOUS at 08:54

## 2021-05-05 RX ADMIN — FLUTICASONE PROPIONATE 2 SPRAY: 50 SPRAY, METERED NASAL at 08:53

## 2021-05-05 RX ADMIN — ENOXAPARIN SODIUM 30 MG: 30 INJECTION SUBCUTANEOUS at 08:53

## 2021-05-05 RX ADMIN — SODIUM CHLORIDE, PRESERVATIVE FREE 10 ML: 5 INJECTION INTRAVENOUS at 20:25

## 2021-05-05 RX ADMIN — AMLODIPINE BESYLATE 5 MG: 5 TABLET ORAL at 08:53

## 2021-05-05 RX ADMIN — CETIRIZINE HYDROCHLORIDE 10 MG: 10 TABLET ORAL at 08:53

## 2021-05-05 RX ADMIN — POLYETHYLENE GLYCOL 3350 17 G: 17 POWDER, FOR SOLUTION ORAL at 00:15

## 2021-05-05 ASSESSMENT — PAIN SCALES - GENERAL
PAINLEVEL_OUTOF10: 0
PAINLEVEL_OUTOF10: 0

## 2021-05-05 NOTE — PROGRESS NOTES
Treatment: Not applicable  Family / Caregiver Present: No  Subjective  Subjective: Pt and RN agreed to PT today. Pt reported feeling dizzy during AMB and that she gets tired quickly. Pain Screening  Patient Currently in Pain: Other (comment)(0/10, \"but I do have indigestion\")  Vital Signs  Patient Currently in Pain: Other (comment)(0/10, \"but I do have indigestion\")       Orientation  Orientation  Overall Orientation Status: Within Normal Limits    Objective   Bed mobility  Supine to Sit: Stand by assistance  Scooting: Stand by assistance  Transfers  Sit to Stand: Minimal Assistance  Stand to sit: Minimal Assistance  Stand Pivot Transfers: Minimal Assistance  Ambulation  Ambulation?: Yes  More Ambulation?: Yes  Ambulation 1  Surface: level tile  Other Apparatus: O2  Assistance: Minimal assistance  Quality of Gait: moves cautiously  Gait Deviations: Slow Lauren;Decreased step length;Decreased step height;Decreased arm swing;Decreased head and trunk rotation  Distance: ~20ft  Comments: Report of feeling dizzy and tired  Ambulation 2  Surface - 2: level tile  Device 2: No device  Other Apparatus 2: O2  Assistance 2: Minimal assistance  Quality of Gait 2: moves cautiously  Gait Deviations: Slow Lauren;Decreased step length;Decreased step height;Decreased arm swing;Decreased head and trunk rotation  Distance: ~20ft  Comments: Report of feeling dizzy and tired  Ambulation 3  Surface - 3: level tile  Device 3: Rolling Walker  Assistance 3: Contact guard assistance  Gait Deviations: Slow Lauren; Increased JCARLOS; Decreased step length;Decreased step height  Distance: ~90ft     Balance  Posture: Good  Sitting - Static: Good  Sitting - Dynamic: Good  Standing - Static: Fair  Standing - Dynamic: Fair;-  Exercises  Hip Flexion: BLE x10  Hip Abduction: BLE x10  Knee Long Arc Quad: BLE x20  Ankle Pumps: BLE x20  Shoulder Active Range of Motion: Shoulder rolls both ways, shrugs: BUE x10       Goals  Short term goals  Time Frame for Short term goals: 12 visits  Short term goal 1: independent bed mobility without use of bed controls  Short term goal 2: independent transfers  Short term goal 3: independent gait with appropriate device vs none x 200'  Short term goal 4: independent stair ambulation x3 steps, no HR  Patient Goals   Patient goals : get stronger, walk better, less tired    Plan    Plan  Times per week: 5-6 visits weekly  Times per day: Daily  Current Treatment Recommendations: Strengthening, ROM, Balance Training, Functional Mobility Training, Transfer Training, Endurance Training, Gait Training, Stair training, Home Exercise Program, Safety Education & Training, Patient/Caregiver Education & Training, Equipment Evaluation, Education, & procurement  Safety Devices  Type of devices: Call light within reach, Chair alarm in place, Gait belt, Patient at risk for falls, Left in chair, All fall risk precautions in place, Nurse notified  Restraints  Initially in place: No     Therapy Time   Individual Concurrent Group Co-treatment   Time In 0932         Time Out 1014         Minutes 42         Timed Code Treatment Minutes: [facility]

## 2021-05-05 NOTE — PROGRESS NOTES
Occupational Therapy  Facility/Department: [facility] CAR 3  Daily Treatment Note  NAME: Mekhi Núñez  : 1934  MRN: [id number]    Date of Service: 2021    Discharge Recommendations:  Patient would benefit from continued therapy after discharge d/t overall weakness. Assessment   Performance deficits / Impairments: Decreased functional mobility ; Decreased ADL status; Decreased strength;Decreased endurance;Decreased balance;Decreased high-level IADLs  Assessment: Pt will require continued OT services to increase safety, independence, balance, and cognition for increased ability to perform ADLs/IADLs and functional transfers/mobility prior to returning to prior living arrangements. Treatment Diagnosis: COVID 19  Prognosis: Good  REQUIRES OT FOLLOW UP: Yes  Activity Tolerance  Activity Tolerance: Patient Tolerated treatment well;Patient limited by fatigue  Activity Tolerance: dizziness and weakness, rest breaks required throughout tx  Safety Devices  Safety Devices in place: Yes  Type of devices: Call light within reach; Chair alarm in place; Patient at risk for falls; Left in chair;Nurse notified         Patient Diagnosis(es): The encounter diagnosis was COVID-19.      has a past medical history of Anemia, Anxiety, Depression, Fibromyalgia, Gastritis, GERD (gastroesophageal reflux disease), Hyperlipidemia, Hypertension, Lumbar disc disease, Neuropathy, Numbness, Osteoarthritis, Watermelon stomach, and Wears glasses. has a past surgical history that includes Cholecystectomy; nasal endoscopy; Hemorrhoid surgery; back surgery (13); Cataract removal; Upper gastrointestinal endoscopy; and pr esophagogastroduodenoscopy transoral diagnostic (N/A, 2017).     Restrictions  Restrictions/Precautions  Restrictions/Precautions: General Precautions, Fall Risk, Isolation, Up as Tolerated(droplet plus precautions)  Required Braces or Orthoses?: No  Position Activity Restriction  Other position/activity restrictions: O2 @3L NC   Subjective   General  Patient assessed for rehabilitation services?: Yes  Family / Caregiver Present: No  General Comment  Comments: RN ok'd pt for OT tx this date. Pt agreeable to session and pleasant/cooperative throughout. Pain Assessment  Response to Pain Intervention: Patient Satisfied  Vital Signs  Patient Currently in Pain: Denies      Objective    ADL  Feeding: Supervision;Dentures; Increased time to complete(able to open containers and feed self)  Grooming: Supervision;Setup; Increased time to complete(seated at sink, washed face, washed and combed hair, brushed teeth)      Pt up in chair upon arrival. STS and func mob w/no AD (none in room) from chair to sink side chair for grooming(see above for LOF). Pt needed increased time to complete d/t fatigue and SOB with increased activity. Pt transferred to toilet and completed dorian care w/SBA. Retired back to KAITLIN Barrios, BLE elevated, call light and phone in reach. Pt wore O2 throughout tx, SpO2 @93% at end of tx. Educ on proper use of IS and pt verbalized and demo understanding. Lunch tray brought in and setup for pt. RN notified. Brought SW into room at end of tx for pt to use for safe func mob. Pt states lives at home with 79 yo .       Balance  Sitting Balance: Supervision(seated unsupported on chair at bathroom sink & in recliner)  Standing Balance: Contact guard assistance(w/no AD)  Standing Balance  Time: Pt stood for approx 5 min total for transfer and func mob from chair to/from bathroom w/no AD  Comment: no AD, unsteady d/t weakness and fatigue  Functional Mobility  Functional - Mobility Device: No device in room to use  Assist Level: Minimal assistance  Functional Mobility Comments: No AD from chair to/from bathroom  Toilet Transfers  Toilet - Technique: Ambulating  Equipment Used: Grab bars  Toilet Transfer: Contact guard assistance  Toilet Transfers Comments: pt supervision for toilet hygiene(pt used bathroom grab bars for support/safety)     Transfers  Stand Step Transfers: Minimal assistance(hand held assistance)  Sit to stand: Contact guard assistance  Stand to sit: Contact guard assistance  Transfer Comments: Hand held assist d/t weakness and fatigue      Plan   Plan  Times per week: 3-5 x/wk  Current Treatment Recommendations: Balance Training, Functional Mobility Training, Endurance Training, Cognitive Reorientation, Safety Education & Training, Patient/Caregiver Education & Training, Equipment Evaluation, Education, & procurement, Self-Care / ADL, Home Management Training    Goals  Short term goals  Time Frame for Short term goals: By discharge, pt will:  Short term goal 1: Demo Mod I for functional transfers and functional mobility with use of LRD  Short term goal 2: Demo I with setup for UB ADLs and grooming tasks  Short term goal 3: Demo SBA with setup for LB ADLs and toileting tasks  Short term goal 4: Demo 10+ minutes dynamic standing task to increase balance for independence with ADLs/IADLs  Short term goal 5: Demo good safety awareness with 0 VCs throughout therapy session     Therapy Time   Individual Concurrent Group Co-treatment   Time In  1100         Time Out  1142         Minutes  42 total tx time                 HECTOR GUTIERREZ/BELA

## 2021-05-05 NOTE — DISCHARGE INSTR - COC
Electronically signed by Bárbara Olguin RN on 5/8/21 at 2:54 PM EDT    CASE MANAGEMENT/SOCIAL WORK SECTION    Inpatient Status Date: 5/1/21    Readmission Risk Assessment Score:  Readmission Risk              Risk of Unplanned Readmission:        19           Discharging to Facility/ Agency   · Name: [facility]  · Address:  · Phone:  · Fax:    Dialysis Facility (if applicable)   · Name:  · Address:  · Dialysis Schedule:  · Phone:  · Fax:    / signature: Electronically signed by Tere Tidwell RN on 5/8/2021 at 4:09 PM    PHYSICIAN SECTION    Prognosis: Fair    Condition at Discharge: Stable    Rehab Potential (if transferring to Rehab): Fair    Recommended Labs or Other Treatments After Discharge: cbc,bmp in 5 days, PT/OT, nurse to eval and treat    Physician Certification: I certify the above information and transfer of Nikkie Moody  is necessary for the continuing treatment of the diagnosis listed and that she requires [address] for less 30 days.      Update Admission H&P: No change in H&P    PHYSICIAN SIGNATURE:  Electronically signed by Dakota Maurice MD on 5/8/21 at 2:41 PM EDT

## 2021-05-05 NOTE — PROGRESS NOTES
[facility] [address] [facility] Patient  Today's Date and Time: 5/5/2021, 2:31 PM    Impression :   · COVID 19 Suspect  · COVID 19 Confirmed Infection  · Covid tests:  · 5/1/2021 positive  · Hyponatremia-resolved  · ROQUE  · Elevated inflammatory markers  · Anemia  Recommendations:   · Monitor off antibiotics  · Clinical Research will approach patient to explore if she qualifies for any of the COVID 19 treatment protocols. · Remdesivir started 5/2/21 due to elevated CRP-will monitor renal and liver function   · Decadron started 5/1/2021  · Trend CRP    Medical Decision Making/Summary/Discussion:5/5/2021     · Patient admitted with suspected COVID 19 infection  · Covid test confirmed positive. Infection Control Recommendations   · Universal Precautions  · Airborne isolation  · Droplet Isolation    Antimicrobial Stewardship Recommendations     · Discontinuation of therapy  Coordination of Outpatient Care:   · Estimated Length of IV antimicrobials:TBD  · Patient will need Midline Catheter Insertion: TBD  · Patient will need PICC line Insertion: No  · Patient will need: Home IV , [place],  SNF,  LTAC:TBD  · Patient will need outpatient wound care:No    Chief complaint/reason for consultation:   · Concern for COVID infection      History of Present Illness:   Kristopher Villasenor is a 80y.o.-year-old  female who was initially admitted on 5/1/2021. Patient seen at the request of Bonny Melo. INITIAL HISTORY:    Patient presented through ER with complaints of having diffuse weakness particularly of the lower extremities to the point that she could not get up, fatigue, fevers, dry cough. He reported a prior sinus infection about a week prior to her admission. She also reported some urinary incontinence with dribbling. Her evaluation in the emergency room did not show any pulmonary involvement by the chest x-ray.   However her COVID-19 test was positive on changes   Substance and Sexual Activity    Alcohol use: Yes     Comment: RARE DRINK    Drug use: No    Sexual activity: Not on file   Lifestyle    Physical activity     Days per week: Not on file     Minutes per session: Not on file    Stress: Not on file   Relationships    Social connections     Talks on phone: Not on file     Gets together: Not on file     Attends [employer] service: Not on file     Active member of club or organization: Not on file     Attends meetings of clubs or organizations: Not on file     Relationship status: Not on file    Intimate partner violence     Fear of current or ex partner: Not on file     Emotionally abused: Not on file     Physically abused: Not on file     Forced sexual activity: Not on file   Other Topics Concern    Not on file   Social History Narrative    Not on file       Family History:     Family History   Problem Relation Age of Onset    Kidney Disease Sister     Stroke Other     Diabetes Other         Allergies:   Augmentin [amoxicillin-pot clavulanate], Biaxin [clarithromycin], Amoxicillin, and Ciprofloxacin     Review of Systems:       Constitutional: No fevers or chills. No systemic complaints  Head: No headaches  Eyes: No double vision or blurry vision. No conjunctival inflammation. ENT: No sore throat or runny nose. . No hearing loss, tinnitus or vertigo. Cardiovascular: No chest pain or palpitations. No Shortness of breath. Positive CALZADA  Lung: No Shortness of breath or cough. No sputum production  Abdomen: No nausea, vomiting, diarrhea, or abdominal pain. [employer] No cramps. Genitourinary: No increased urinary frequency, or dysuria. No hematuria. No suprapubic or CVA pain  Musculoskeletal: No muscle aches or pains. No joint effusions, swelling or deformities  Hematologic: No bleeding or bruising. Neurologic: No headache, weakness, numbness, or tingling. Integument: No rash, no ulcers. Psychiatric: No depression.    Endocrine: No polyuria, no polydipsia, no RBC 2.94 05/05/2021    TRICHOMONAS NOT REPORTED 05/01/2021    WBC 7.5 05/05/2021    YEAST NOT REPORTED 05/01/2021    TURBIDITY CLEAR 05/01/2021     Lab Results   Component Value Date    CREATININE 1.27 05/05/2021    GLUCOSE 209 05/05/2021       Medical Decision Making-Imaging:     EXAMINATION:   ONE XRAY VIEW OF THE CHEST       5/1/2021 12:02 pm       COMPARISON:   Chest radiograph performed 09/22/2020.       HISTORY:   ORDERING SYSTEM PROVIDED HISTORY: concern for tachycardia, fatigue   TECHNOLOGIST PROVIDED HISTORY:   concern for tachycardia, fatigue       FINDINGS:   There is no acute consolidation or effusion.  There is no pneumothorax.  The   mediastinal structures are unremarkable.  The upper abdomen is unremarkable. The extrathoracic soft tissues are unremarkable. Debra Lolling is no acute osseous   abnormality.           Impression   No acute cardiopulmonary process. EXAMINATION:   CT OF THE HEAD WITHOUT CONTRAST  5/1/2021 2:00 pm       TECHNIQUE:   CT of the head was performed without the administration of intravenous   contrast. Dose modulation, iterative reconstruction, and/or weight based   adjustment of the mA/kV was utilized to reduce the radiation dose to as low   as reasonably achievable.       COMPARISON:   January 6, 2020.       HISTORY:   ORDERING SYSTEM PROVIDED HISTORY: concern for fall off of toilet a couple of   weeks ago   TECHNOLOGIST PROVIDED HISTORY:       concern for fall off of toilet a couple of weeks ago   Decision Support Exception - unselect if not a suspected or confirmed   emergency medical condition->Emergency Medical Condition (MA)       FINDINGS:   BRAIN/VENTRICLES: The gyri and sulci have a normal appearance. There is mild   cortical and cerebellar volume loss with associated ex vacuo ventricular   dilation.  Mild diffuse periventricular and subcortical deep white matter   hypoattenuation noted, findings compatible with chronic small vessel ischemic   disease.  A mild degree of encephalomalacia within the left parietal and   temporal lobe regions is unchanged.  The gray-white matter differentiation is   otherwise preserved throughout. Dorothye Hero is no acute hemorrhage, mass, or mass   effect.  No evidence of acute territorial infarct.  No abnormal extraaxial   fluid collections.       ORBITS:  The visualized portion of the orbits demonstrate no acute   abnormality.       SINUSES:  The mastoid air cells are normally aerated.  The visualized   paranasal sinuses are grossly clear.       SOFT TISSUES/SKULL:  No significant abnormality of the visualized skull or   soft tissues. No acute fracture. No scalp hematoma.           Impression   No evidence of acute intracranial process. Medical Decision [id number]:       Medical Decision Making-Other:     Note:  · Labs, medications, radiologic studies were reviewed with personal review of films  · Large amounts of data were reviewed  · Discussed with nursing Staff, Discharge planner  · Infection Control and Prevention measures reviewed  · All prior entries were reviewed  · Administer medications as ordered  · Prognosis: Guarded  · Discharge planning reviewed  · Follow up as outpatient. Thank you for allowing us to participate in the care of this patient. Please call with questions. GUERRERO Morton - CNP     ATTESTATION:    I have discussed the case, including pertinent history and exam findings with the APRN. I have evaluated the  History, physical findings and pictures of the patient and the key elements of the encounter have been performed by me. I have reviewed the laboratory data, other diagnostic studies and discussed them with the APRN. I have updated the medical record where necessary. I agree with the assessment, plan and orders as documented by the APRN.     Humberto Vegas MD.        Pager: [phone] - Office: [phone]

## 2021-05-05 NOTE — PLAN OF CARE
Problem: Airway Clearance - Ineffective  Goal: Achieve or maintain patent airway  Outcome: Ongoing     Problem: Gas Exchange - Impaired  Goal: Absence of hypoxia  Outcome: Ongoing  Goal: Promote optimal lung function  Outcome: Ongoing     Problem: Breathing Pattern - Ineffective  Goal: Ability to achieve and maintain a regular respiratory rate  Outcome: Ongoing     Problem:  Body Temperature -  Risk of, Imbalanced  Goal: Ability to maintain a body temperature within defined limits  Outcome: Ongoing  Goal: Will regain or maintain usual level of consciousness  Outcome: Ongoing  Goal: Complications related to the disease process, condition or treatment will be avoided or minimized  Outcome: Ongoing     Problem: Isolation Precautions - Risk of Spread of Infection  Goal: Prevent transmission of infection  Outcome: Ongoing     Problem: Nutrition Deficits  Goal: Optimize nutritional status  Outcome: Ongoing     Problem: Risk for Fluid Volume Deficit  Goal: Maintain normal heart rhythm  Outcome: Ongoing  Goal: Maintain absence of muscle cramping  Outcome: Ongoing  Goal: Maintain normal serum potassium, sodium, calcium, phosphorus, and pH  Outcome: Ongoing     Problem: Loneliness or Risk for Loneliness  Goal: Demonstrate positive use of time alone when socialization is not possible  Outcome: Ongoing     Problem: Fatigue  Goal: Verbalize increase energy and improved vitality  Outcome: Ongoing     Problem: Patient Education: Go to Patient Education Activity  Goal: Patient/Family Education  Outcome: Ongoing     Problem: Falls - Risk of:  Goal: Will remain free from falls  Description: Will remain free from falls  Outcome: Ongoing  Goal: Absence of physical injury  Description: Absence of physical injury  Outcome: Ongoing     Problem: Skin Integrity:  Goal: Will show no infection signs and symptoms  Description: Will show no infection signs and symptoms  Outcome: Ongoing  Goal: Absence of new skin breakdown  Description: Absence of new skin breakdown  Outcome: Ongoing     Problem: Musculor/Skeletal Functional Status  Goal: Highest potential functional level  Outcome: Ongoing  Goal: Absence of falls  Outcome: Ongoing

## 2021-05-05 NOTE — PLAN OF CARE
Problem: Airway Clearance - Ineffective  Goal: Achieve or maintain patent airway  Outcome: Met This Shift     Problem: Gas Exchange - Impaired  Goal: Absence of hypoxia  Outcome: Met This Shift  Goal: Promote optimal lung function  Outcome: Met This Shift     Problem: Breathing Pattern - Ineffective  Goal: Ability to achieve and maintain a regular respiratory rate  Outcome: Met This Shift     Problem:  Body Temperature -  Risk of, Imbalanced  Goal: Ability to maintain a body temperature within defined limits  Outcome: Met This Shift  Goal: Will regain or maintain usual level of consciousness  Outcome: Met This Shift  Goal: Complications related to the disease process, condition or treatment will be avoided or minimized  Outcome: Met This Shift     Problem: Isolation Precautions - Risk of Spread of Infection  Goal: Prevent transmission of infection  Outcome: Met This Shift     Problem: Nutrition Deficits  Goal: Optimize nutritional status  Outcome: Met This Shift     Problem: Risk for Fluid Volume Deficit  Goal: Maintain normal heart rhythm  Outcome: Met This Shift  Goal: Maintain absence of muscle cramping  Outcome: Met This Shift  Goal: Maintain normal serum potassium, sodium, calcium, phosphorus, and pH  Outcome: Met This Shift     Problem: Loneliness or Risk for Loneliness  Goal: Demonstrate positive use of time alone when socialization is not possible  Outcome: Met This Shift     Problem: Fatigue  Goal: Verbalize increase energy and improved vitality  Outcome: Met This Shift     Problem: Patient Education: Go to Patient Education Activity  Goal: Patient/Family Education  Outcome: Met This Shift     Problem: Falls - Risk of:  Goal: Will remain free from falls  Description: Will remain free from falls  Outcome: Met This Shift  Goal: Absence of physical injury  Description: Absence of physical injury  Outcome: Met This Shift     Problem: Skin Integrity:  Goal: Will show no infection signs and symptoms Description: Will show no infection signs and symptoms  Outcome: Met This Shift  Goal: Absence of new skin breakdown  Description: Absence of new skin breakdown  Outcome: Met This Shift     Problem: Musculor/Skeletal Functional Status  Goal: Highest potential functional level  Outcome: Met This Shift  Goal: Absence of falls  Outcome: Met This Shift

## 2021-05-06 LAB
ABSOLUTE EOS #: 0 K/UL (ref 0–0.4)
ABSOLUTE IMMATURE GRANULOCYTE: 0.12 K/UL (ref 0–0.3)
ABSOLUTE LYMPH #: 1.28 K/UL (ref 1–4.8)
ABSOLUTE MONO #: 0.93 K/UL (ref 0.1–0.8)
ANION GAP SERPL CALCULATED.3IONS-SCNC: 12 MMOL/L (ref 9–17)
BASOPHILS # BLD: 0 % (ref 0–2)
BASOPHILS ABSOLUTE: 0 K/UL (ref 0–0.2)
BUN BLDV-MCNC: 38 MG/DL (ref 8–23)
BUN/CREAT BLD: ABNORMAL (ref 9–20)
CALCIUM SERPL-MCNC: 8 MG/DL (ref 8.6–10.4)
CHLORIDE BLD-SCNC: 100 MMOL/L (ref 98–107)
CO2: 24 MMOL/L (ref 20–31)
CREAT SERPL-MCNC: 1.21 MG/DL (ref 0.5–0.9)
DIFFERENTIAL TYPE: ABNORMAL
EOSINOPHILS RELATIVE PERCENT: 0 % (ref 1–4)
GFR AFRICAN AMERICAN: 51 ML/MIN
GFR NON-AFRICAN AMERICAN: 42 ML/MIN
GFR SERPL CREATININE-BSD FRML MDRD: ABNORMAL ML/MIN/{1.73_M2}
GFR SERPL CREATININE-BSD FRML MDRD: ABNORMAL ML/MIN/{1.73_M2}
GLUCOSE BLD-MCNC: 210 MG/DL (ref 70–99)
HCT VFR BLD CALC: 28.7 % (ref 36.3–47.1)
HEMOGLOBIN: 9 G/DL (ref 11.9–15.1)
IMMATURE GRANULOCYTES: 1 %
LYMPHOCYTES # BLD: 11 % (ref 24–44)
MCH RBC QN AUTO: 29.8 PG (ref 25.2–33.5)
MCHC RBC AUTO-ENTMCNC: 31.4 G/DL (ref 28.4–34.8)
MCV RBC AUTO: 95 FL (ref 82.6–102.9)
MONOCYTES # BLD: 8 % (ref 1–7)
MORPHOLOGY: NORMAL
NRBC AUTOMATED: 0 PER 100 WBC
PDW BLD-RTO: 13.6 % (ref 11.8–14.4)
PLATELET # BLD: 232 K/UL (ref 138–453)
PLATELET ESTIMATE: ABNORMAL
PMV BLD AUTO: 11.1 FL (ref 8.1–13.5)
POTASSIUM SERPL-SCNC: 4 MMOL/L (ref 3.7–5.3)
RBC # BLD: 3.02 M/UL (ref 3.95–5.11)
RBC # BLD: ABNORMAL 10*6/UL
SEG NEUTROPHILS: 80 % (ref 36–66)
SEGMENTED NEUTROPHILS ABSOLUTE COUNT: 9.27 K/UL (ref 1.8–7.7)
SODIUM BLD-SCNC: 136 MMOL/L (ref 135–144)
WBC # BLD: 11.6 K/UL (ref 3.5–11.3)
WBC # BLD: ABNORMAL 10*3/UL

## 2021-05-06 PROCEDURE — 2580000003 HC RX 258: Performed by: NURSE PRACTITIONER

## 2021-05-06 PROCEDURE — 36415 COLL VENOUS BLD VENIPUNCTURE: CPT

## 2021-05-06 PROCEDURE — 6370000000 HC RX 637 (ALT 250 FOR IP): Performed by: INTERNAL MEDICINE

## 2021-05-06 PROCEDURE — 6360000002 HC RX W HCPCS: Performed by: INTERNAL MEDICINE

## 2021-05-06 PROCEDURE — 2580000003 HC RX 258: Performed by: INTERNAL MEDICINE

## 2021-05-06 PROCEDURE — 2700000000 HC OXYGEN THERAPY PER DAY

## 2021-05-06 PROCEDURE — 99232 SBSQ HOSP IP/OBS MODERATE 35: CPT | Performed by: INTERNAL MEDICINE

## 2021-05-06 PROCEDURE — APPSS30 APP SPLIT SHARED TIME 16-30 MINUTES: Performed by: NURSE PRACTITIONER

## 2021-05-06 PROCEDURE — 2500000003 HC RX 250 WO HCPCS: Performed by: NURSE PRACTITIONER

## 2021-05-06 PROCEDURE — 80048 BASIC METABOLIC PNL TOTAL CA: CPT

## 2021-05-06 PROCEDURE — 97110 THERAPEUTIC EXERCISES: CPT

## 2021-05-06 PROCEDURE — 85025 COMPLETE CBC W/AUTO DIFF WBC: CPT

## 2021-05-06 PROCEDURE — 2060000000 HC ICU INTERMEDIATE R&B

## 2021-05-06 PROCEDURE — 97530 THERAPEUTIC ACTIVITIES: CPT

## 2021-05-06 PROCEDURE — 6370000000 HC RX 637 (ALT 250 FOR IP): Performed by: NURSE PRACTITIONER

## 2021-05-06 PROCEDURE — 99233 SBSQ HOSP IP/OBS HIGH 50: CPT | Performed by: INTERNAL MEDICINE

## 2021-05-06 PROCEDURE — 94761 N-INVAS EAR/PLS OXIMETRY MLT: CPT

## 2021-05-06 RX ADMIN — ENOXAPARIN SODIUM 30 MG: 30 INJECTION SUBCUTANEOUS at 08:11

## 2021-05-06 RX ADMIN — REMDESIVIR 100 MG: 100 INJECTION, POWDER, LYOPHILIZED, FOR SOLUTION INTRAVENOUS at 12:34

## 2021-05-06 RX ADMIN — AMLODIPINE BESYLATE 5 MG: 5 TABLET ORAL at 08:11

## 2021-05-06 RX ADMIN — SODIUM CHLORIDE, PRESERVATIVE FREE 10 ML: 5 INJECTION INTRAVENOUS at 08:11

## 2021-05-06 RX ADMIN — DEXAMETHASONE 6 MG: 4 TABLET ORAL at 08:11

## 2021-05-06 RX ADMIN — CETIRIZINE HYDROCHLORIDE 10 MG: 10 TABLET ORAL at 08:11

## 2021-05-06 RX ADMIN — FLUTICASONE PROPIONATE 2 SPRAY: 50 SPRAY, METERED NASAL at 08:11

## 2021-05-06 RX ADMIN — SODIUM CHLORIDE, PRESERVATIVE FREE 10 ML: 5 INJECTION INTRAVENOUS at 20:32

## 2021-05-06 ASSESSMENT — PAIN SCALES - GENERAL: PAINLEVEL_OUTOF10: 0

## 2021-05-06 NOTE — PLAN OF CARE
Problem: Airway Clearance - Ineffective  Goal: Achieve or maintain patent airway  5/6/2021 0405 by Jerry Castillo RN  Outcome: Ongoing  5/5/2021 1703 by Sotero Miner RN  Outcome: Met This Shift     Problem: Gas Exchange - Impaired  Goal: Absence of hypoxia  5/6/2021 0405 by Jerry Castillo RN  Outcome: Ongoing  5/5/2021 1703 by Sotero Miner RN  Outcome: Met This Shift  Goal: Promote optimal lung function  5/6/2021 0405 by Jerry Castillo RN  Outcome: Ongoing  5/5/2021 1703 by Sotero Miner RN  Outcome: Met This Shift     Problem: Breathing Pattern - Ineffective  Goal: Ability to achieve and maintain a regular respiratory rate  5/6/2021 0405 by Jerry Castillo RN  Outcome: Ongoing  5/5/2021 1703 by Sotero Miner RN  Outcome: Met This Shift     Problem:  Body Temperature -  Risk of, Imbalanced  Goal: Ability to maintain a body temperature within defined limits  5/6/2021 0405 by Jerry Castillo RN  Outcome: Ongoing  5/5/2021 1703 by Sotero Miner RN  Outcome: Met This Shift  Goal: Will regain or maintain usual level of consciousness  5/6/2021 0405 by Jerry Castillo RN  Outcome: Ongoing  5/5/2021 1703 by Sotero Miner RN  Outcome: Met This Shift  Goal: Complications related to the disease process, condition or treatment will be avoided or minimized  5/6/2021 0405 by Jerry Castillo RN  Outcome: Ongoing  5/5/2021 1703 by Sotero Miner RN  Outcome: Met This Shift     Problem: Isolation Precautions - Risk of Spread of Infection  Goal: Prevent transmission of infection  5/6/2021 0405 by Jerry Castillo RN  Outcome: Ongoing  5/5/2021 1703 by Sotero Miner RN  Outcome: Met This Shift     Problem: Nutrition Deficits  Goal: Optimize nutritional status  5/6/2021 0405 by Jerry Castillo RN  Outcome: Ongoing  5/5/2021 1703 by Sotero iMner RN  Outcome: Met This Shift     Problem: Risk for Fluid Volume Deficit  Goal: Maintain normal heart rhythm  5/6/2021 0405 by Jerry Castillo RN Outcome: Ongoing  5/5/2021 1703 by Becky Maldonado RN  Outcome: Met This Shift  Goal: Maintain absence of muscle cramping  5/6/2021 0405 by Jay Brumfield RN  Outcome: Ongoing  5/5/2021 1703 by Becky Maldonado RN  Outcome: Met This Shift  Goal: Maintain normal serum potassium, sodium, calcium, phosphorus, and pH  5/6/2021 0405 by Jay Brumfield RN  Outcome: Ongoing  5/5/2021 1703 by Becky Maldonado RN  Outcome: Met This Shift     Problem: Loneliness or Risk for Loneliness  Goal: Demonstrate positive use of time alone when socialization is not possible  5/6/2021 0405 by Jay Brumfield RN  Outcome: Ongoing  5/5/2021 1703 by Becky Maldonado RN  Outcome: Met This Shift     Problem: Fatigue  Goal: Verbalize increase energy and improved vitality  5/6/2021 0405 by Jay Brumfield RN  Outcome: Ongoing  5/5/2021 1703 by Becky Maldonado RN  Outcome: Met This Shift     Problem: Patient Education: Go to Patient Education Activity  Goal: Patient/Family Education  5/6/2021 0405 by Jay Brumfield RN  Outcome: Ongoing  5/5/2021 1703 by Becky Maldonado RN  Outcome: Met This Shift     Problem: Falls - Risk of:  Goal: Will remain free from falls  Description: Will remain free from falls  5/6/2021 0405 by Jay Brumfield RN  Outcome: Ongoing  5/5/2021 1703 by Becky Maldonado RN  Outcome: Met This Shift  Goal: Absence of physical injury  Description: Absence of physical injury  5/6/2021 0405 by Jay Brumfield RN  Outcome: Ongoing  5/5/2021 1703 by Becky Maldonado RN  Outcome: Met This Shift     Problem: Skin Integrity:  Goal: Will show no infection signs and symptoms  Description: Will show no infection signs and symptoms  5/6/2021 0405 by Jay Brumfield RN  Outcome: Ongoing  5/5/2021 1703 by Becky Maldonado RN  Outcome: Met This Shift  Goal: Absence of new skin breakdown  Description: Absence of new skin breakdown  5/6/2021 0405 by Jay Brumfield RN  Outcome: Ongoing  5/5/2021 1703 by Umberto Hauser Hailee Logan RN  Outcome: Met This Shift     Problem: Musculor/Skeletal Functional Status  Goal: Highest potential functional level  5/6/2021 0405 by Nemo Pereira RN  Outcome: Ongoing  5/5/2021 1703 by Milissa Pallas, RN  Outcome: Met This Shift  Goal: Absence of falls  5/6/2021 0405 by Nemo Pereira RN  Outcome: Ongoing  5/5/2021 1703 by Milissa Pallas, RN  Outcome: Met This Shift

## 2021-05-06 NOTE — PROGRESS NOTES
DATE: 2021    NAME: Rachel Harmon  MRN: [id number]   : 1934      Patient not seen this date for Physical Therapy due to:     Other: Pt reported that she is going home today, she wants to rest, and she has no questions or concerns regarding PT      Electronically signed by Abdirashid Solitario PTA on 2021 at 9:46 AM

## 2021-05-06 NOTE — PROGRESS NOTES
Restriction  Other position/activity restrictions: O2 3L/min NC     Subjective   General  Chart Reviewed: Yes  Response To Previous Treatment: Not applicable  Family / Caregiver Present: No  Subjective  Subjective: Pt and RN agreed to PT today. Pt still gets tired quickly.   Pain Screening  Patient Currently in Pain: Denies  Vital Signs  Patient Currently in Pain: Denies       Orientation  Orientation  Overall Orientation Status: Within Normal Limits    Objective   Transfers  Sit to Stand: Stand by assistance  Stand to sit: Stand by assistance  Bed to Chair: Stand by assistance  Stand Pivot Transfers: Stand by assistance  Comment: pt transferred and used the commmode a RW and SBA  Ambulation  Ambulation?: Yes  More Ambulation?: No  Ambulation 1  Surface: level tile  Device: Rolling Walker  Other Apparatus: O2(3L/min)  Assistance: Stand by assistance  Gait Deviations: Slow Lauren;Decreased step length;Decreased step height  Distance: ~140ft  Comments: still requires rest break after AMB     Balance  Posture: Good  Sitting - Static: Good  Sitting - Dynamic: Good  Standing - Static: Fair;+  Standing - Dynamic: Fair;+  Exercises  Hip Flexion: BLE x10  Hip Abduction: BLE x10  Knee Long Arc Quad: BLE x10  Ankle Pumps: BLE x10       Goals  Short term goals  Time Frame for Short term goals: 12 visits  Short term goal 1: independent bed mobility without use of bed controls  Short term goal 2: independent transfers  Short term goal 3: independent gait with appropriate device vs none x 200'  Short term goal 4: independent stair ambulation x3 steps, no HR  Patient Goals   Patient goals : get stronger, walk better, less tired    Plan    Plan  Times per week: 5-6 visits weekly  Times per day: Daily  Current Treatment Recommendations: Strengthening, ROM, Balance Training, Functional Mobility Training, Transfer Training, Endurance Training, Gait Training, Stair training, Home Exercise Program, Safety Education & Training, Patient/Caregiver Education & Training, Equipment Evaluation, Education, & procurement  Safety Devices  Type of devices: Call light within reach, Gait belt, Left in chair, All fall risk precautions in place, Nurse notified  Restraints  Initially in place: No     Therapy Time   Individual Concurrent Group Co-treatment   Time In 1334         Time Out 1402         Minutes 28         Timed Code Treatment Minutes: 14[facility]

## 2021-05-06 NOTE — PROGRESS NOTES
[facility] - Progress Note   COVID 19 Patient  Today's Date and Time: 5/6/2021, 11:15 AM    Impression :   · COVID 19 Suspect  · COVID 19 Confirmed Infection  · Covid tests:  · 5/1/2021 positive  · Hyponatremia-resolved  · ROQUE  · Elevated inflammatory markers  · Anemia  Recommendations:   · Monitor off antibiotics  · Clinical Research will approach patient to explore if she qualifies for any of the COVID 19 treatment protocols. · Remdesivir started 5/2/21 due to elevated CRP-will monitor renal and liver function   · Decadron started 5/1/2021  · Trend CRP    Medical Decision Making/Summary/Discussion:5/6/2021     · Patient admitted with suspected COVID 19 infection  · Covid test confirmed positive. Infection Control Recommendations   · Universal Precautions  · Airborne isolation  · Droplet Isolation    Antimicrobial Stewardship Recommendations     · Discontinuation of therapy  Coordination of Outpatient Care:   · Estimated Length of IV antimicrobials:TBD  · Patient will need Midline Catheter Insertion: TBD  · Patient will need PICC line Insertion: No  · Patient will need: Home IV , [place],  SNF,  LTAC:TBD  · Patient will need outpatient wound care:No    Chief complaint/reason for consultation:   · Concern for COVID infection      History of Present Illness:   Jacquie Gutierrez is a 80y.o.-year-old  female who was initially admitted on 5/1/2021. Patient seen at the request of Hailee Murcia. INITIAL HISTORY:    Patient presented through ER with complaints of having diffuse weakness particularly of the lower extremities to the point that she could not get up, fatigue, fevers, dry cough. He reported a prior sinus infection about a week prior to her admission. She also reported some urinary incontinence with dribbling. Her evaluation in the emergency room did not show any pulmonary involvement by the chest x-ray.   However her COVID-19 test was positive on I have personally reviewed the past medical history, past surgical history, medications, social history, and family history, and I have updated the database accordingly.   Past Medical History:     Past Medical History:   Diagnosis Date    Anemia     Anxiety     anxiety, depression    Depression     Fibromyalgia     Gastritis     GERD (gastroesophageal reflux disease)     Hyperlipidemia     Hypertension     Lumbar disc disease     LOW BACK PAIN INTERMITTENT/STATES LARGE BULGE    Neuropathy     left leg numbness    Numbness     LEFT LEG/KNEE TO ANKLE/WEAK LEG-USES WALKER     Osteoarthritis     Watermelon stomach 02/2016    Wears glasses        Past Surgical  History:     Past Surgical History:   Procedure Laterality Date    BACK SURGERY  6/17/13    micro [id number]    CATARACT REMOVAL      wesly cataract    CHOLECYSTECTOMY      HEMORRHOID SURGERY      NASAL ENDOSCOPY      CT ESOPHAGOGASTRODUODENOSCOPY TRANSORAL DIAGNOSTIC N/A 5/9/2017    EGD ESOPHAGOGASTRODUODENOSCOPY WITH APC performed by Luis Fernando Mishra MD at [facility]    UPPER GASTROINTESTINAL ENDOSCOPY         Medications:      remdesivir IVPB  100 mg Intravenous Q24H    fluticasone  2 spray Nasal Daily    cetirizine  10 mg Oral Daily    sodium chloride flush  5-40 mL Intravenous 2 times per day    enoxaparin  30 mg Subcutaneous Daily    dexamethasone  6 mg Oral Daily    amLODIPine  5 mg Oral Daily       Social History:     Social History     Socioeconomic History    Marital status:      Spouse name: Not on file    Number of children: Not on file    Years of education: Not on file    Highest education level: Not on file   Occupational History    Not on file   Social Needs    Financial resource strain: Not on file    Food insecurity     Worry: Not on file     Inability: Not on file    Transportation needs     Medical: Not on file     Non-medical: Not on file   Tobacco Use    Smoking status: Former Smoker    Component Value Date    MUCUS NOT REPORTED 05/01/2021    RBC 3.02 05/06/2021    TRICHOMONAS NOT REPORTED 05/01/2021    WBC 11.6 05/06/2021    YEAST NOT REPORTED 05/01/2021    TURBIDITY CLEAR 05/01/2021     Lab Results   Component Value Date    CREATININE 1.21 05/06/2021    GLUCOSE 210 05/06/2021       Medical Decision Making-Imaging:     EXAMINATION:   ONE XRAY VIEW OF THE CHEST       5/1/2021 12:02 pm       COMPARISON:   Chest radiograph performed 09/22/2020.       HISTORY:   ORDERING SYSTEM PROVIDED HISTORY: concern for tachycardia, fatigue   TECHNOLOGIST PROVIDED HISTORY:   concern for tachycardia, fatigue       FINDINGS:   There is no acute consolidation or effusion.  There is no pneumothorax.  The   mediastinal structures are unremarkable.  The upper abdomen is unremarkable. The extrathoracic soft tissues are unremarkable. Donneta Roller is no acute osseous   abnormality.           Impression   No acute cardiopulmonary process. EXAMINATION:   CT OF THE HEAD WITHOUT CONTRAST  5/1/2021 2:00 pm       TECHNIQUE:   CT of the head was performed without the administration of intravenous   contrast. Dose modulation, iterative reconstruction, and/or weight based   adjustment of the mA/kV was utilized to reduce the radiation dose to as low   as reasonably achievable.       COMPARISON:   January 6, 2020.       HISTORY:   ORDERING SYSTEM PROVIDED HISTORY: concern for fall off of toilet a couple of   weeks ago   TECHNOLOGIST PROVIDED HISTORY:       concern for fall off of toilet a couple of weeks ago   Decision Support Exception - unselect if not a suspected or confirmed   emergency medical condition->Emergency Medical Condition (MA)       FINDINGS:   BRAIN/VENTRICLES: The gyri and sulci have a normal appearance. There is mild   cortical and cerebellar volume loss with associated ex vacuo ventricular   dilation.  Mild diffuse periventricular and subcortical deep white matter   hypoattenuation noted, findings compatible with chronic small vessel ischemic   disease.  A mild degree of encephalomalacia within the left parietal and   temporal lobe regions is unchanged.  The gray-white matter differentiation is   otherwise preserved throughout. Aung Diones is no acute hemorrhage, mass, or mass   effect.  No evidence of acute territorial infarct.  No abnormal extraaxial   fluid collections.       ORBITS:  The visualized portion of the orbits demonstrate no acute   abnormality.       SINUSES:  The mastoid air cells are normally aerated.  The visualized   paranasal sinuses are grossly clear.       SOFT TISSUES/SKULL:  No significant abnormality of the visualized skull or   soft tissues. No acute fracture. No scalp hematoma.           Impression   No evidence of acute intracranial process. Medical Decision [id number]:       Medical Decision Making-Other:     Note:  · Labs, medications, radiologic studies were reviewed with personal review of films  · Large amounts of data were reviewed  · Discussed with nursing Staff, Discharge planner  · Infection Control and Prevention measures reviewed  · All prior entries were reviewed  · Administer medications as ordered  · Prognosis: Guarded  · Discharge planning reviewed  · Follow up as outpatient. Thank you for allowing us to participate in the care of this patient. Please call with questions. GUERRERO Ignacio - CNP     ATTESTATION:    I have discussed the case, including pertinent history and exam findings with the APRN. I have evaluated the  History, physical findings and pictures of the patient and the key elements of the encounter have been performed by me. I have reviewed the laboratory data, other diagnostic studies and discussed them with the APRN. I have updated the medical record where necessary. I agree with the assessment, plan and orders as documented by the APRN.     Kylee Toribio MD.          Pager: [phone] - Office: [phone]

## 2021-05-07 LAB
ABSOLUTE EOS #: 0.24 K/UL (ref 0–0.4)
ABSOLUTE IMMATURE GRANULOCYTE: 0.73 K/UL (ref 0–0.3)
ABSOLUTE LYMPH #: 1.09 K/UL (ref 1–4.8)
ABSOLUTE MONO #: 1.33 K/UL (ref 0.1–0.8)
ANION GAP SERPL CALCULATED.3IONS-SCNC: 13 MMOL/L (ref 9–17)
BASOPHILS # BLD: 1 % (ref 0–2)
BASOPHILS ABSOLUTE: 0.12 K/UL (ref 0–0.2)
BUN BLDV-MCNC: 33 MG/DL (ref 8–23)
BUN/CREAT BLD: ABNORMAL (ref 9–20)
CALCIUM SERPL-MCNC: 7.9 MG/DL (ref 8.6–10.4)
CHLORIDE BLD-SCNC: 100 MMOL/L (ref 98–107)
CO2: 23 MMOL/L (ref 20–31)
CREAT SERPL-MCNC: 1.19 MG/DL (ref 0.5–0.9)
CULTURE: NORMAL
CULTURE: NORMAL
DIFFERENTIAL TYPE: ABNORMAL
EOSINOPHILS RELATIVE PERCENT: 2 % (ref 1–4)
GFR AFRICAN AMERICAN: 52 ML/MIN
GFR NON-AFRICAN AMERICAN: 43 ML/MIN
GFR SERPL CREATININE-BSD FRML MDRD: ABNORMAL ML/MIN/{1.73_M2}
GFR SERPL CREATININE-BSD FRML MDRD: ABNORMAL ML/MIN/{1.73_M2}
GLUCOSE BLD-MCNC: 190 MG/DL (ref 70–99)
HCT VFR BLD CALC: 27.5 % (ref 36.3–47.1)
HEMOGLOBIN: 8.9 G/DL (ref 11.9–15.1)
IMMATURE GRANULOCYTES: 6 %
LYMPHOCYTES # BLD: 9 % (ref 24–44)
Lab: NORMAL
Lab: NORMAL
MCH RBC QN AUTO: 30.1 PG (ref 25.2–33.5)
MCHC RBC AUTO-ENTMCNC: 32.4 G/DL (ref 28.4–34.8)
MCV RBC AUTO: 92.9 FL (ref 82.6–102.9)
MONOCYTES # BLD: 11 % (ref 1–7)
MORPHOLOGY: NORMAL
NRBC AUTOMATED: 0 PER 100 WBC
PDW BLD-RTO: 13.6 % (ref 11.8–14.4)
PLATELET # BLD: 280 K/UL (ref 138–453)
PLATELET ESTIMATE: ABNORMAL
PMV BLD AUTO: 10.8 FL (ref 8.1–13.5)
POTASSIUM SERPL-SCNC: 4.2 MMOL/L (ref 3.7–5.3)
RBC # BLD: 2.96 M/UL (ref 3.95–5.11)
RBC # BLD: ABNORMAL 10*6/UL
SEG NEUTROPHILS: 71 % (ref 36–66)
SEGMENTED NEUTROPHILS ABSOLUTE COUNT: 8.59 K/UL (ref 1.8–7.7)
SODIUM BLD-SCNC: 136 MMOL/L (ref 135–144)
SPECIMEN DESCRIPTION: NORMAL
SPECIMEN DESCRIPTION: NORMAL
WBC # BLD: 12.1 K/UL (ref 3.5–11.3)
WBC # BLD: ABNORMAL 10*3/UL

## 2021-05-07 PROCEDURE — 80048 BASIC METABOLIC PNL TOTAL CA: CPT

## 2021-05-07 PROCEDURE — 97530 THERAPEUTIC ACTIVITIES: CPT

## 2021-05-07 PROCEDURE — 6360000002 HC RX W HCPCS: Performed by: INTERNAL MEDICINE

## 2021-05-07 PROCEDURE — APPSS30 APP SPLIT SHARED TIME 16-30 MINUTES: Performed by: NURSE PRACTITIONER

## 2021-05-07 PROCEDURE — 94761 N-INVAS EAR/PLS OXIMETRY MLT: CPT

## 2021-05-07 PROCEDURE — 36415 COLL VENOUS BLD VENIPUNCTURE: CPT

## 2021-05-07 PROCEDURE — 2060000000 HC ICU INTERMEDIATE R&B

## 2021-05-07 PROCEDURE — 85025 COMPLETE CBC W/AUTO DIFF WBC: CPT

## 2021-05-07 PROCEDURE — 99232 SBSQ HOSP IP/OBS MODERATE 35: CPT | Performed by: INTERNAL MEDICINE

## 2021-05-07 PROCEDURE — 2580000003 HC RX 258: Performed by: INTERNAL MEDICINE

## 2021-05-07 PROCEDURE — 2700000000 HC OXYGEN THERAPY PER DAY

## 2021-05-07 PROCEDURE — 6370000000 HC RX 637 (ALT 250 FOR IP): Performed by: NURSE PRACTITIONER

## 2021-05-07 PROCEDURE — 6370000000 HC RX 637 (ALT 250 FOR IP): Performed by: INTERNAL MEDICINE

## 2021-05-07 PROCEDURE — 99233 SBSQ HOSP IP/OBS HIGH 50: CPT | Performed by: INTERNAL MEDICINE

## 2021-05-07 RX ADMIN — DEXAMETHASONE 6 MG: 4 TABLET ORAL at 09:22

## 2021-05-07 RX ADMIN — AMLODIPINE BESYLATE 5 MG: 5 TABLET ORAL at 09:22

## 2021-05-07 RX ADMIN — ENOXAPARIN SODIUM 30 MG: 30 INJECTION SUBCUTANEOUS at 09:22

## 2021-05-07 RX ADMIN — CETIRIZINE HYDROCHLORIDE 10 MG: 10 TABLET ORAL at 09:22

## 2021-05-07 RX ADMIN — SODIUM CHLORIDE, PRESERVATIVE FREE 10 ML: 5 INJECTION INTRAVENOUS at 09:22

## 2021-05-07 RX ADMIN — ONDANSETRON 4 MG: 2 INJECTION INTRAMUSCULAR; INTRAVENOUS at 18:45

## 2021-05-07 RX ADMIN — FLUTICASONE PROPIONATE 2 SPRAY: 50 SPRAY, METERED NASAL at 09:22

## 2021-05-07 RX ADMIN — SODIUM CHLORIDE, PRESERVATIVE FREE 10 ML: 5 INJECTION INTRAVENOUS at 20:00

## 2021-05-07 RX ADMIN — POLYETHYLENE GLYCOL 3350 17 G: 17 POWDER, FOR SOLUTION ORAL at 09:22

## 2021-05-07 ASSESSMENT — PAIN SCALES - GENERAL: PAINLEVEL_OUTOF10: 0

## 2021-05-07 NOTE — PROGRESS NOTES
Occupational Therapy    Occupational Therapy Not Seen Note    DATE: 2021  Name: Flor Lerma  : 1934  MRN: [id number]    Patient not available for Occupational Therapy due to:    Patient Declined: Pt just finished working with PT, ambulated 200 ft. Fatigued and stated completing ADLs earlier this day independently.         Electronically signed by SHANIA Godoy on 2021 at 2:22 PM

## 2021-05-07 NOTE — PLAN OF CARE
Problem: Airway Clearance - Ineffective  Goal: Achieve or maintain patent airway  Outcome: Ongoing     Problem: Gas Exchange - Impaired  Goal: Absence of hypoxia  Outcome: Ongoing  Goal: Promote optimal lung function  Outcome: Ongoing     Problem: Breathing Pattern - Ineffective  Goal: Ability to achieve and maintain a regular respiratory rate  Outcome: Ongoing     Problem:  Body Temperature -  Risk of, Imbalanced  Goal: Ability to maintain a body temperature within defined limits  Outcome: Ongoing  Goal: Will regain or maintain usual level of consciousness  Outcome: Ongoing  Goal: Complications related to the disease process, condition or treatment will be avoided or minimized  Outcome: Ongoing     Problem: Isolation Precautions - Risk of Spread of Infection  Goal: Prevent transmission of infection  Outcome: Ongoing     Problem: Nutrition Deficits  Goal: Optimize nutritional status  Outcome: Ongoing     Problem: Risk for Fluid Volume Deficit  Goal: Maintain normal heart rhythm  Outcome: Ongoing  Goal: Maintain absence of muscle cramping  Outcome: Ongoing  Goal: Maintain normal serum potassium, sodium, calcium, phosphorus, and pH  Outcome: Ongoing     Problem: Loneliness or Risk for Loneliness  Goal: Demonstrate positive use of time alone when socialization is not possible  Outcome: Ongoing     Problem: Fatigue  Goal: Verbalize increase energy and improved vitality  Outcome: Ongoing     Problem: Patient Education: Go to Patient Education Activity  Goal: Patient/Family Education  Outcome: Ongoing     Problem: Falls - Risk of:  Goal: Will remain free from falls  Description: Will remain free from falls  Outcome: Ongoing  Goal: Absence of physical injury  Description: Absence of physical injury  Outcome: Ongoing     Problem: Skin Integrity:  Goal: Will show no infection signs and symptoms  Description: Will show no infection signs and symptoms  Outcome: Ongoing  Goal: Absence of new skin breakdown  Description:

## 2021-05-07 NOTE — PROGRESS NOTES
Physical Therapy  Daily Treatment Note  NAME: Med Mendez  : 1934  MRN: [id number]    Date of Service: 2021    Discharge Recommendations:  Patient would benefit from continued therapy after discharge   PT Equipment Recommendations  Equipment Needed: No(pt has: Cane, Rolling walker)    Assessment   Body structures, Functions, Activity limitations: Decreased functional mobility ; Decreased endurance;Decreased balance  Assessment: Pt reported being tired, but was cooperative and motivated to stay active. Pt has decreased endurance and was fatigued after using the commode and AMB in the room. Pt AMB ~200ft with a RW and SBA. Pt would benefit from further PT to improve function, endurance and independence. Prognosis: Good  Patient Education: no new EDU required & no questions/concerns from pt  REQUIRES PT FOLLOW UP: Yes  Activity Tolerance  Activity Tolerance: Patient limited by fatigue;Patient limited by endurance  Activity Tolerance: fatigued after AMB (pt reported that she had a good amount of activity earlier too)     Patient Diagnosis(es): The encounter diagnosis was COVID-19.     has a past medical history of Anemia, Anxiety, Depression, Fibromyalgia, Gastritis, GERD (gastroesophageal reflux disease), Hyperlipidemia, Hypertension, Lumbar disc disease, Neuropathy, Numbness, Osteoarthritis, Watermelon stomach, and Wears glasses. has a past surgical history that includes Cholecystectomy; nasal endoscopy; Hemorrhoid surgery; back surgery (13); Cataract removal; Upper gastrointestinal endoscopy; and pr esophagogastroduodenoscopy transoral diagnostic (N/A, 2017).     Restrictions  Restrictions/Precautions  Restrictions/Precautions: Isolation, Fall Risk, Up as Tolerated, General Precautions(droplet plus precautions)  Required Braces or Orthoses?: No  Position Activity Restriction  Other position/activity restrictions: O2 3L/min NC     Subjective   General  Chart Reviewed: Yes  Response To Previous Treatment: Not applicable  Family / Caregiver Present: No  Subjective  Subjective: Pt and RN agreed to PT today. Pt reported being pretty tired at the moment, but said she would still walk.   Pain Screening  Patient Currently in Pain: Denies  Vital Signs  Patient Currently in Pain: Denies  Oxygen Therapy  O2 Device: Nasal cannula       Orientation  Orientation  Overall Orientation Status: Within Normal Limits    Objective   Bed mobility  Supine to Sit: Stand by assistance  Sit to Supine: Stand by assistance  Scooting: Stand by assistance  Comment: PTA assisted with tangled lines/wires  Transfers  Sit to Stand: Stand by assistance  Stand to sit: Stand by assistance  Bed to Chair: Stand by assistance  Stand Pivot Transfers: Stand by assistance  Comment: pt transferred and used the commmode with a RW and SBA  Ambulation  Ambulation?: Yes  More Ambulation?: No  Ambulation 1  Surface: level tile  Device: Rolling Walker  Other Apparatus: O2(3L/min)  Assistance: Stand by assistance  Quality of Gait: moves cautiously; pt self-corrected one stagger/LOB  Gait Deviations: Slow Lauren;Decreased step length;Decreased step height  Distance: ~200ft  Comments: fatigued after AMB     Balance  Posture: Good  Sitting - Static: Good  Sitting - Dynamic: Good  Standing - Static: Fair;+  Standing - Dynamic: Fair;+  Exercises  Comments: Pt performs her UE & LE TherEx throughout the day       Goals  Short term goals  Time Frame for Short term goals: 12 visits  Short term goal 1: independent bed mobility without use of bed controls  Short term goal 2: independent transfers  Short term goal 3: independent gait with appropriate device vs none x 200'  Short term goal 4: independent stair ambulation x3 steps, no HR  Patient Goals   Patient goals : get stronger, walk better, less tired    Plan    Plan  Times per week: 5-6 visits weekly  Times per day: Daily  Current Treatment Recommendations: Strengthening, ROM, Balance Training, Functional Mobility Training, Transfer Training, Endurance Training, Gait Training, Stair training, Home Exercise Program, Safety Education & Training, Patient/Caregiver Education & Training, Equipment Evaluation, Education, & procurement  Safety Devices  Type of devices: Call light within reach, Gait belt, All fall risk precautions in place, Nurse notified, Bed alarm in place, Patient at risk for falls  Restraints  Initially in place: No     Therapy Time   Individual Concurrent Group Co-treatment   Time In 1322         Time Out 1340         Minutes 18         Timed Code Treatment Minutes: [address] [facility], [facility] [place]

## 2021-05-07 NOTE — PROGRESS NOTES
[facility] - Progress Note   COVID 19 Patient  Today's Date and Time: 5/7/2021, 10:28 AM    Impression :   · COVID 19 Suspect  · COVID 19 Confirmed Infection  · Covid tests:  · 5/1/2021 positive  · Hyponatremia-resolved  · ROQUE  · Elevated inflammatory markers  · Anemia  Recommendations:   · Monitor off antibiotics  · Clinical Research will approach patient to explore if she qualifies for any of the COVID 19 treatment protocols. · Remdesivir started 5/2/21 due to elevated CRP-will monitor renal and liver function   · Decadron started 5/1/2021  · Trend CRP    Medical Decision Making/Summary/Discussion:5/7/2021     · Patient admitted with suspected COVID 19 infection  · Covid test confirmed positive. Infection Control Recommendations   · Universal Precautions  · Airborne isolation  · Droplet Isolation    Antimicrobial Stewardship Recommendations     · Discontinuation of therapy  Coordination of Outpatient Care:   · Estimated Length of IV antimicrobials:TBD  · Patient will need Midline Catheter Insertion: TBD  · Patient will need PICC line Insertion: No  · Patient will need: Home IV , [place],  SNF,  LTAC:TBD  · Patient will need outpatient wound care:No    Chief complaint/reason for consultation:   · Concern for COVID infection      History of Present Illness:   Dariana Burch is a 80y.o.-year-old  female who was initially admitted on 5/1/2021. Patient seen at the request of Lilia Horan. INITIAL HISTORY:    Patient presented through ER with complaints of having diffuse weakness particularly of the lower extremities to the point that she could not get up, fatigue, fevers, dry cough. He reported a prior sinus infection about a week prior to her admission. She also reported some urinary incontinence with dribbling. Her evaluation in the emergency room did not show any pulmonary involvement by the chest x-ray.   However her COVID-19 test was positive on patient, RN, CC, IM. I have personally reviewed the past medical history, past surgical history, medications, social history, and family history, and I have updated the database accordingly.   Past Medical History:     Past Medical History:   Diagnosis Date    Anemia     Anxiety     anxiety, depression    Depression     Fibromyalgia     Gastritis     GERD (gastroesophageal reflux disease)     Hyperlipidemia     Hypertension     Lumbar disc disease     LOW BACK PAIN INTERMITTENT/STATES LARGE BULGE    Neuropathy     left leg numbness    Numbness     LEFT LEG/KNEE TO ANKLE/WEAK LEG-USES WALKER     Osteoarthritis     Watermelon stomach 02/2016    Wears glasses        Past Surgical  History:     Past Surgical History:   Procedure Laterality Date    BACK SURGERY  6/17/13    micro [id number]    CATARACT REMOVAL      wesly cataract    CHOLECYSTECTOMY      HEMORRHOID SURGERY      NASAL ENDOSCOPY      OH ESOPHAGOGASTRODUODENOSCOPY TRANSORAL DIAGNOSTIC N/A 5/9/2017    EGD ESOPHAGOGASTRODUODENOSCOPY WITH APC performed by Brent Ferrari MD at [facility]    UPPER GASTROINTESTINAL ENDOSCOPY         Medications:      fluticasone  2 spray Nasal Daily    cetirizine  10 mg Oral Daily    sodium chloride flush  5-40 mL Intravenous 2 times per day    enoxaparin  30 mg Subcutaneous Daily    dexamethasone  6 mg Oral Daily    amLODIPine  5 mg Oral Daily       Social History:     Social History     Socioeconomic History    Marital status:      Spouse name: Not on file    Number of children: Not on file    Years of education: Not on file    Highest education level: Not on file   Occupational History    Not on file   Social Needs    Financial resource strain: Not on file    Food insecurity     Worry: Not on file     Inability: Not on file    Transportation needs     Medical: Not on file     Non-medical: Not on file   Tobacco Use    Smoking status: Former Smoker    Smokeless tobacco: Never Used    Tobacco comment: QUIT OVER 48 YRS AGO  6/17/13  Denies changes   Substance and Sexual Activity    Alcohol use: Yes     Comment: RARE DRINK    Drug use: No    Sexual activity: Not on file   Lifestyle    Physical activity     Days per week: Not on file     Minutes per session: Not on file    Stress: Not on file   Relationships    Social connections     Talks on phone: Not on file     Gets together: Not on file     Attends Baptism service: Not on file     Active member of club or organization: Not on file     Attends meetings of clubs or organizations: Not on file     Relationship status: Not on file    Intimate partner violence     Fear of current or ex partner: Not on file     Emotionally abused: Not on file     Physically abused: Not on file     Forced sexual activity: Not on file   Other Topics Concern    Not on file   Social History Narrative    Not on file       Family History:     Family History   Problem Relation Age of Onset    Kidney Disease Sister     Stroke Other     Diabetes Other         Allergies:   Augmentin [amoxicillin-pot clavulanate], Biaxin [clarithromycin], Amoxicillin, and Ciprofloxacin     Review of Systems:       Constitutional: No fevers or chills. No systemic complaints  Head: No headaches  Eyes: No double vision or blurry vision. No conjunctival inflammation. ENT: No sore throat or runny nose. . No hearing loss, tinnitus or vertigo. Cardiovascular: No chest pain or palpitations. No Shortness of breath. Positive CALZADA  Lung: No Shortness of breath or cough. No sputum production  Abdomen: No nausea, vomiting, diarrhea, or abdominal pain. Vane Sida No cramps. Genitourinary: No increased urinary frequency, or dysuria. No hematuria. No suprapubic or CVA pain  Musculoskeletal: No muscle aches or pains. No joint effusions, swelling or deformities  Hematologic: No bleeding or bruising. Neurologic: No headache, weakness, numbness, or tingling. Integument: No rash, no ulcers. in the last 72 hours. Lab Results   Component Value Date    MUCUS NOT REPORTED 05/01/2021    RBC 2.96 05/07/2021    TRICHOMONAS NOT REPORTED 05/01/2021    WBC 12.1 05/07/2021    YEAST NOT REPORTED 05/01/2021    TURBIDITY CLEAR 05/01/2021     Lab Results   Component Value Date    CREATININE 1.19 05/07/2021    GLUCOSE 190 05/07/2021       Medical Decision Making-Imaging:     EXAMINATION:   ONE XRAY VIEW OF THE CHEST       5/1/2021 12:02 pm       COMPARISON:   Chest radiograph performed 09/22/2020.       HISTORY:   ORDERING SYSTEM PROVIDED HISTORY: concern for tachycardia, fatigue   TECHNOLOGIST PROVIDED HISTORY:   concern for tachycardia, fatigue       FINDINGS:   There is no acute consolidation or effusion.  There is no pneumothorax.  The   mediastinal structures are unremarkable.  The upper abdomen is unremarkable. The extrathoracic soft tissues are unremarkable. Arneta Sarahy is no acute osseous   abnormality.           Impression   No acute cardiopulmonary process. EXAMINATION:   CT OF THE HEAD WITHOUT CONTRAST  5/1/2021 2:00 pm       TECHNIQUE:   CT of the head was performed without the administration of intravenous   contrast. Dose modulation, iterative reconstruction, and/or weight based   adjustment of the mA/kV was utilized to reduce the radiation dose to as low   as reasonably achievable.       COMPARISON:   January 6, 2020.       HISTORY:   ORDERING SYSTEM PROVIDED HISTORY: concern for fall off of toilet a couple of   weeks ago   TECHNOLOGIST PROVIDED HISTORY:       concern for fall off of toilet a couple of weeks ago   Decision Support Exception - unselect if not a suspected or confirmed   emergency medical condition->Emergency Medical Condition (MA)       FINDINGS:   BRAIN/VENTRICLES: The gyri and sulci have a normal appearance. There is mild   cortical and cerebellar volume loss with associated ex vacuo ventricular   dilation.  Mild diffuse periventricular and subcortical deep white matter hypoattenuation noted, findings compatible with chronic small vessel ischemic   disease.  A mild degree of encephalomalacia within the left parietal and   temporal lobe regions is unchanged.  The gray-white matter differentiation is   otherwise preserved throughout. Rickford Math is no acute hemorrhage, mass, or mass   effect.  No evidence of acute territorial infarct.  No abnormal extraaxial   fluid collections.       ORBITS:  The visualized portion of the orbits demonstrate no acute   abnormality.       SINUSES:  The mastoid air cells are normally aerated.  The visualized   paranasal sinuses are grossly clear.       SOFT TISSUES/SKULL:  No significant abnormality of the visualized skull or   soft tissues. No acute fracture. No scalp hematoma.           Impression   No evidence of acute intracranial process. Medical Decision Lieirr-Ymxxzssw-Smogt:       Medical Decision Making-Other:     Note:  · Labs, medications, radiologic studies were reviewed with personal review of films  · Large amounts of data were reviewed  · Discussed with nursing Staff, Discharge planner  · Infection Control and Prevention measures reviewed  · All prior entries were reviewed  · Administer medications as ordered  · Prognosis: Guarded  · Discharge planning reviewed  · Follow up as outpatient. Thank you for allowing us to participate in the care of this patient. Please call with questions. Hina Arviuz, APRN - CNP     ATTESTATION:    I have discussed the case, including pertinent history and exam findings with the APRN. I have evaluated the  History, physical findings and pictures of the patient and the key elements of the encounter have been performed by me. I have reviewed the laboratory data, other diagnostic studies and discussed them with the APRN. I have updated the medical record where necessary. I agree with the assessment, plan and orders as documented by the APRN.     Bridget Srivastava MD.        Pager: [phone] - Office: [phone]

## 2021-05-07 NOTE — PROGRESS NOTES
[facility]  Office: [address], DO, Sparkle Sparks, DO, Valentino Garcia, DO, [facility], DO, Pretty Gabriel MD, Sherri Howard MD, Milvia Kelley MD, Lisa Hernandez MD, Palak Sparrow MD, Shell Silveira MD, Jaclyn Hand MD, Elen Fournier MD, Jd Cruz, DO, Harpal Alonzo MD, Kavya Burris, DO, Nasir Foster MD,  Liliana Benedict, DO, Nacho Vora MD, Corazon Hernandez MD, Makenna Izaguirre MD, Belkis Darden MD, Hildegard Bumpers, Krista Caceres, CNP, Tosha Reyes, [facility], Anna Loredo, CNS, Mat Orozco, CNP, Martita Mckeon, CNP, Ana Fontaine, CNP, Farooq Luna, CNP, Laura Monique, CNP, Duncan Da Silva, PA-C, Shantal Jeronimo, [facility], Kassie Romero, CNP, Ruba Stahl, CNP, Fabienne Ayala, CNP, Leopold Saras, CNP, Sudha Red, CNP, Ranulfo Castro, [facility]    Progress Note    Name:   Jacquie Gutierrez  MRN:     [id number]     [place]:      [de-identified]   Room:   [address] [place] [address] Day:  6  Admit Date:  5/1/2021 11:39 AM    PCP:   Jeannie Amaya MD  Code Status:  DNR-CCA  Subjective:     C/C:   Chief Complaint   Patient presents with   [address] Fatigue    Chills    Extremity Weakness     bilateral legs    Fever     100.4 oral     Interval History Status: not changed. Seen and examined face-to-face,  Improved than yesterday,  Still needing 3  L of oxygen. o2 sats dropped in mid 80s with movement   Will check home o2 eval today        Brief History:   80year-old with prior history of anxiety depression fibromyalgia GERD hypertension hyperlipoidemia present to the ED with complaints of fatigue lower extremity venous fever and dry coughx 5days. Was treated with 5 days of oral antibiotic for possible sinus infection about a week ago.   ED work-up she was found to be febrile 100.4 tachypneic respiratory rate 33, elevated blood pressure 163/86, hyponatremic sodium of 129, creatinine 1.29 , Covid rapid test positive, Diabetes Other        Vitals:  /62   Pulse 88   Temp 98.4 °F (36.9 °C) (Oral)   Resp 26   Ht 5' 4\" (1.626 m)   Wt 158 lb 4.6 oz (71.8 kg)   SpO2 97%   BMI 27.17 kg/m²   Temp (24hrs), Av.3 °F (36.8 °C), Min:97.9 °F (36.6 °C), Max:98.8 °F (37.1 °C)    No results for input(s): POCGLU in the last 72 hours. I/O (24Hr): Intake/Output Summary (Last 24 hours) at 2021 1717  Last data filed at 2021 0441  Gross per 24 hour   Intake 500 ml   Output 1700 ml   Net -1200 ml       Labs:  Hematology:  Recent Labs     21  0604 21  0631 21  0633   WBC 7.5 11.6* 12.1*   RBC 2.94* 3.02* 2.96*   HGB 8.8* 9.0* 8.9*   HCT 27.3* 28.7* 27.5*   MCV 92.9 95.0 92.9   MCH 29.9 29.8 30.1   MCHC 32.2 31.4 32.4   RDW 13.6 13.6 13.6    232 280   MPV 11.0 11.1 10.8     Chemistry:  Recent Labs     21  0621  0631 21  0633   * 136 136   K 3.8 4.0 4.2   CL 99 100 100   CO2 23 24 23   GLUCOSE 209* 210* 190*   BUN 41* 38* 33*   CREATININE 1.27* 1.21* 1.19*   ANIONGAP 11 12 13   LABGLOM 40* 42* 43*   GFRAA 48* 51* 52*   CALCIUM 7.8* 8.0* 7.9*     No results for input(s): PROT, LABALBU, LABA1C, C9CTRHU, Q0TTMEV, FT4, TSH, AST, ALT, LDH, GGT, ALKPHOS, LABGGT, BILITOT, BILIDIR, AMMONIA, AMYLASE, LIPASE, LACTATE, CHOL, HDL, LDLCHOLESTEROL, CHOLHDLRATIO, TRIG, VLDL, ILG51EO, PHENYTOIN, PHENYF, URICACID, POCGLU in the last 72 hours. Radiology:  CT Head Wo Contrast  Result Date: 2021  No evidence of acute intracranial process. Xr Chest Portable  Result Date: 2021  No acute cardiopulmonary process.      Physical Examination:        General appearance:  alert, cooperative and no distress  Mental Status:  oriented to person, place and time and normal affect  Lungs:  B/l coarse BS  Heart:  regular rate and rhythm, no murmur  Abdomen:  soft, nontender, nondistended, normal bowel sounds   Extremities:  no edema, redness, tenderness in the calves  Skin:  no gross lesions, rashes, induration    Assessment:        Hospital Problems           Last Modified POA    * (Principal) Upper respiratory tract infection due to COVID-19 virus 5/2/2021 Yes    HTN (hypertension) (Chronic) 5/2/2021 Yes    Anxiety (Chronic) 5/2/2021 Yes    Depression (Chronic) 5/2/2021 Yes    Stage 3 chronic kidney disease 5/2/2021 Yes    GERD (gastroesophageal reflux disease) 5/2/2021 Yes    Fibromyalgia 5/2/2021 Yes          Plan:        -COVID-19 infection: continue  remdesivir and dexamethasone. Appreciate ID input. Nasal cannula O2 as needed to maintain O2 sats more than 95%. -Hyponatremia: resolved. Will discontinue fluids.   -Hypertension: Continue home dose of antihypertensives. -CKD stage III with baseline creatinine around 1.2-1.3 continue to monitor. -DVT/GI prophylaxis.   -Full CODE STATUS  Possible home o2 eval today    Rhys Jacques MD  5/7/2021

## 2021-05-07 NOTE — PLAN OF CARE
Nutrition Problem #1: No nutrition diagnosis at this time  Intervention: Food and/or Nutrient Delivery: Continue Current Diet  Nutritional Goals: PO intake to meet >75% of estimated nutrition needs

## 2021-05-07 NOTE — PROGRESS NOTES
Nutrition Assessment     Type and Reason for Visit: Initial, RD Nutrition Re-Screen/LOS    Nutrition Recommendations/Plan: Continue current Cardiac Diet as tolerated. Monitor need for an oral nutrition supplement as appropriate. Monitor/encourage intakes. Nutrition Assessment:  Patient seen for length of stay. Spoke with RN who reports that patient's appetite and PO intake is \"getting better. \" Denies need for an oral nutrition supplement at this time. Malnutrition Assessment:  Malnutrition Status: Insufficient data    Estimated Daily Nutrient Needs:  Energy (kcal): 7628-0205 kcal/d (23-25 kcal/kg); Weight Used for Energy Requirements:  Current(71.8 kg)     Protein (g): 65-75 g protein/d (1.2-1.4 g/kg); Weight Used for Protein Requirements:  Ideal(55 kg)        Fluid (ml/day): 1800 mL fluid/d or per MD discretion; Weight Used for Fluid Requirements:  ml/Kg(25)      Nutrition Related Findings: Labs: Ca 7.7 (L). Meds: Decadron. Last BM 5/3. Non-pitting generalized, +2 non-pitting BLE edmea. Current Nutrition Therapies:    DIET CARDIAC; Anthropometric Measures:  · Height: 5' 4\" (162.6 cm)  · Current Body Wt: 158 lb 4.6 oz (71.8 kg)   · BMI: 27.2    Nutrition Diagnosis:   No nutrition diagnosis at this time     Nutrition Interventions:   Food and/or Nutrient Delivery:  Continue Current Diet  Nutrition Education/Counseling:  No recommendation at this time, Education not indicated   Coordination of Nutrition Care:  Continue to monitor while inpatient    Goals:  PO intake to meet >75% of estimated nutrition needs       Nutrition Monitoring and Evaluation:   Behavioral-Environmental Outcomes:  None Identified   Food/Nutrient Intake Outcomes:  Food and Nutrient Intake  Physical Signs/Symptoms Outcomes:  Biochemical Data, Fluid Status or Edema, Nutrition Focused Physical Findings, Skin, Weight     Discharge Planning:     Too soon to determine     Electronically signed by Jossie Caldera RD, LD on 5/7/21 at 3:10

## 2021-05-08 VITALS
TEMPERATURE: 97.8 F | BODY MASS INDEX: 27.02 KG/M2 | WEIGHT: 158.29 LBS | OXYGEN SATURATION: 94 % | HEIGHT: 64 IN | HEART RATE: 84 BPM | DIASTOLIC BLOOD PRESSURE: 64 MMHG | RESPIRATION RATE: 19 BRPM | SYSTOLIC BLOOD PRESSURE: 136 MMHG

## 2021-05-08 LAB
ABSOLUTE EOS #: 0 K/UL (ref 0–0.4)
ABSOLUTE IMMATURE GRANULOCYTE: 0.94 K/UL (ref 0–0.3)
ABSOLUTE LYMPH #: 0.8 K/UL (ref 1–4.8)
ABSOLUTE MONO #: 0.94 K/UL (ref 0.1–0.8)
ANION GAP SERPL CALCULATED.3IONS-SCNC: 10 MMOL/L (ref 9–17)
BASOPHILS # BLD: 0 % (ref 0–2)
BASOPHILS ABSOLUTE: 0 K/UL (ref 0–0.2)
BUN BLDV-MCNC: 35 MG/DL (ref 8–23)
BUN/CREAT BLD: ABNORMAL (ref 9–20)
CALCIUM SERPL-MCNC: 8.2 MG/DL (ref 8.6–10.4)
CHLORIDE BLD-SCNC: 97 MMOL/L (ref 98–107)
CO2: 24 MMOL/L (ref 20–31)
CREAT SERPL-MCNC: 1.11 MG/DL (ref 0.5–0.9)
DIFFERENTIAL TYPE: ABNORMAL
EOSINOPHILS RELATIVE PERCENT: 0 % (ref 1–4)
GFR AFRICAN AMERICAN: 56 ML/MIN
GFR NON-AFRICAN AMERICAN: 46 ML/MIN
GFR SERPL CREATININE-BSD FRML MDRD: ABNORMAL ML/MIN/{1.73_M2}
GFR SERPL CREATININE-BSD FRML MDRD: ABNORMAL ML/MIN/{1.73_M2}
GLUCOSE BLD-MCNC: 214 MG/DL (ref 70–99)
HCT VFR BLD CALC: 30 % (ref 36.3–47.1)
HEMOGLOBIN: 9.5 G/DL (ref 11.9–15.1)
IMMATURE GRANULOCYTES: 7 %
LYMPHOCYTES # BLD: 6 % (ref 24–44)
MCH RBC QN AUTO: 29.8 PG (ref 25.2–33.5)
MCHC RBC AUTO-ENTMCNC: 31.7 G/DL (ref 28.4–34.8)
MCV RBC AUTO: 94 FL (ref 82.6–102.9)
MONOCYTES # BLD: 7 % (ref 1–7)
MORPHOLOGY: NORMAL
NRBC AUTOMATED: 0 PER 100 WBC
PDW BLD-RTO: 13.8 % (ref 11.8–14.4)
PLATELET # BLD: 333 K/UL (ref 138–453)
PLATELET ESTIMATE: ABNORMAL
PMV BLD AUTO: 10.7 FL (ref 8.1–13.5)
POTASSIUM SERPL-SCNC: 4.8 MMOL/L (ref 3.7–5.3)
RBC # BLD: 3.19 M/UL (ref 3.95–5.11)
RBC # BLD: ABNORMAL 10*6/UL
SEG NEUTROPHILS: 80 % (ref 36–66)
SEGMENTED NEUTROPHILS ABSOLUTE COUNT: 10.72 K/UL (ref 1.8–7.7)
SODIUM BLD-SCNC: 131 MMOL/L (ref 135–144)
WBC # BLD: 13.4 K/UL (ref 3.5–11.3)
WBC # BLD: ABNORMAL 10*3/UL

## 2021-05-08 PROCEDURE — 85025 COMPLETE CBC W/AUTO DIFF WBC: CPT

## 2021-05-08 PROCEDURE — 6370000000 HC RX 637 (ALT 250 FOR IP): Performed by: NURSE PRACTITIONER

## 2021-05-08 PROCEDURE — 99239 HOSP IP/OBS DSCHRG MGMT >30: CPT | Performed by: INTERNAL MEDICINE

## 2021-05-08 PROCEDURE — 99232 SBSQ HOSP IP/OBS MODERATE 35: CPT | Performed by: NURSE PRACTITIONER

## 2021-05-08 PROCEDURE — 36415 COLL VENOUS BLD VENIPUNCTURE: CPT

## 2021-05-08 PROCEDURE — 2700000000 HC OXYGEN THERAPY PER DAY

## 2021-05-08 PROCEDURE — 99232 SBSQ HOSP IP/OBS MODERATE 35: CPT | Performed by: INTERNAL MEDICINE

## 2021-05-08 PROCEDURE — 6370000000 HC RX 637 (ALT 250 FOR IP): Performed by: INTERNAL MEDICINE

## 2021-05-08 PROCEDURE — 2580000003 HC RX 258: Performed by: INTERNAL MEDICINE

## 2021-05-08 PROCEDURE — 83036 HEMOGLOBIN GLYCOSYLATED A1C: CPT

## 2021-05-08 PROCEDURE — 80048 BASIC METABOLIC PNL TOTAL CA: CPT

## 2021-05-08 PROCEDURE — 6360000002 HC RX W HCPCS: Performed by: INTERNAL MEDICINE

## 2021-05-08 RX ORDER — INSULIN GLARGINE 100 [IU]/ML
10 INJECTION, SOLUTION SUBCUTANEOUS NIGHTLY
Status: DISCONTINUED | OUTPATIENT
Start: 2021-05-08 | End: 2021-05-08 | Stop reason: HOSPADM

## 2021-05-08 RX ORDER — DEXTROSE MONOHYDRATE 50 MG/ML
100 INJECTION, SOLUTION INTRAVENOUS PRN
Status: DISCONTINUED | OUTPATIENT
Start: 2021-05-08 | End: 2021-05-08 | Stop reason: HOSPADM

## 2021-05-08 RX ORDER — DEXAMETHASONE 6 MG/1
6 TABLET ORAL DAILY
Qty: 2 TABLET | Refills: 0 | Status: SHIPPED | OUTPATIENT
Start: 2021-05-09 | End: 2021-05-11

## 2021-05-08 RX ORDER — DEXTROSE MONOHYDRATE 25 G/50ML
12.5 INJECTION, SOLUTION INTRAVENOUS PRN
Status: DISCONTINUED | OUTPATIENT
Start: 2021-05-08 | End: 2021-05-08 | Stop reason: HOSPADM

## 2021-05-08 RX ORDER — NICOTINE POLACRILEX 4 MG
15 LOZENGE BUCCAL PRN
Status: DISCONTINUED | OUTPATIENT
Start: 2021-05-08 | End: 2021-05-08 | Stop reason: HOSPADM

## 2021-05-08 RX ADMIN — ENOXAPARIN SODIUM 30 MG: 30 INJECTION SUBCUTANEOUS at 07:59

## 2021-05-08 RX ADMIN — FLUTICASONE PROPIONATE 2 SPRAY: 50 SPRAY, METERED NASAL at 08:03

## 2021-05-08 RX ADMIN — AMLODIPINE BESYLATE 5 MG: 5 TABLET ORAL at 08:00

## 2021-05-08 RX ADMIN — SODIUM CHLORIDE, PRESERVATIVE FREE 10 ML: 5 INJECTION INTRAVENOUS at 08:00

## 2021-05-08 RX ADMIN — POLYETHYLENE GLYCOL 3350 17 G: 17 POWDER, FOR SOLUTION ORAL at 08:07

## 2021-05-08 RX ADMIN — CETIRIZINE HYDROCHLORIDE 10 MG: 10 TABLET ORAL at 08:00

## 2021-05-08 RX ADMIN — ONDANSETRON 4 MG: 2 INJECTION INTRAMUSCULAR; INTRAVENOUS at 12:23

## 2021-05-08 RX ADMIN — DEXAMETHASONE 6 MG: 4 TABLET ORAL at 07:59

## 2021-05-08 ASSESSMENT — PAIN SCALES - GENERAL: PAINLEVEL_OUTOF10: 0

## 2021-05-08 ASSESSMENT — ENCOUNTER SYMPTOMS
RESPIRATORY NEGATIVE: 1
GASTROINTESTINAL NEGATIVE: 1

## 2021-05-08 NOTE — CARE COORDINATION
Patient/family seen: No: +covid       Informed patient/family of [facility] Medical Bundle Program with potential outreach by either Care Transitions Team or naviHealth Team based on hospital admission and location.        [facility] Notification Letter mailed : Yes         Current discharge plan: home with  referral to [facility] Middleton to [facility] can accept will follow vs [facility] spoke to [facility] can accept
Qualified for home O2-faxed face sheet, testing, O2 order,face to face and MAR to [place], talked with Son Weber, will deliver tanks. 1610 called Bridgett Yang with [facility] and left message regarding discharge. Faxed AVS and Home care order to [facility].     1700 called Bridgett Yang with [facility] will pull AVS from [facility]
TRANSITIONAL CARE PLANNING/ 2 Rehab [facility] Day: 2    Reason for Admission: COVID-19 [U07.1]          Readmission Risk              Risk of Unplanned Readmission:        19            Patient goals/Treatment Preferences/Transitional Plan:   OT note reviewed await pt eval  Called daughter Eli Valerio discussed home care vs snf reviewed lists over phone agreeable to following referrals     Referrals Made: [facility], [facility]
TRANSITIONAL CARE PLANNING/ 2 Rehab [facility] Day: 4    Reason for Admission: COVID-19 [U07.1]   3.5L NC   remdesivir till 5/6    Readmission Risk              Risk of Unplanned Readmission:        23     PT note reviewed   Called daughter emi to update short term  rehab is recommended   Called patient to discuss rehab recommendation she declined wants to go home  Offered home care agreeable to [facility] said if she changes her mind [facility] accepted   Has rollator, lives in a one story home with 1 step to enter          Patient goals/Treatment Preferences/Transitional Plan: home with  & [facility] home [facility]
TRANSITIONAL CARE PLANNING/ 2 Rehab [facility] Day: 6    Reason for Admission: COVID-19 [U07.1]     Treatment Plan of Care: Remdesivir, Decadron    Tests/Procedures still needed:     Barriers to Discharge: Monitor for home O2 needs, remains on 3L NC    Readmission Risk              Risk of Unplanned Readmission:        19            Patient goals/Treatment Preferences/Transitional Plan: Home with  and [facility] - spoke with [facility] they are able to accept over the w/e if pt is d/c'd. Await Home Oxygen evaluation, anticipate [facility] as provider. Will need DME order, face to face, as well as O2 eval.    Referrals Made: Spoke with [facility] at [facility] able to accept this weekend. No referral sent for oxygen at this time.     Follow Up needed: Need Home O2 Eval., and home PT/OT
signed by Tacho Tom RN on 5/2/21 at 4:50 PM EDT

## 2021-05-08 NOTE — PROGRESS NOTES
Home Oxygen Evaluation    Home Oxygen Evaluation completed. Patient is on 3 liters per minute via NC. Resting SpO2 = 95%  Resting SpO2 on room air = 91%    SpO2 on room air with exercise = 87%  SpO2 on oxygen as above with exercise = 91%    Nocturnal Oximetry with patient on room air is recommended is SpO2 is between 89% and 95% (requires additional order).     [address] [facility]  1:35 PM

## 2021-05-08 NOTE — PROGRESS NOTES
[facility] - Progress Note   COVID 19 Patient  Today's Date and Time: 5/8/2021, 10:17 AM    Impression :   · COVID-19 virus infection  · Elevated inflammatory markers  · Acute kidney injury, improving  Recommendations:   ·   · Clinical Research will approach patient to explore if she qualifies for any of the COVID 19 treatment protocols. · Patient is s/p remdesivir  · Patient continues on dexamethasone per COVID-19 protocol  · Patient continues on supplemental oxygen at 4 L per nasal cannula  · Continue supportive care    Medical Decision Making/Summary/Discussion:5/8/2021     · Patient admitted with suspected COVID 19 infection  · Covid test confirmed positive. Infection Control Recommendations   · Universal Precautions  · Airborne isolation  · Droplet Isolation    Antimicrobial Stewardship Recommendations     · Discontinuation of therapy  Coordination of Outpatient Care:   · Estimated Length of IV antimicrobials:  · Patient will need Midline Catheter Insertion: TBD  · Patient will need PICC line Insertion: No  · Patient will need: Home IV , [place],  SNF,  LTAC:TBD  · Patient will need outpatient wound care:No    Chief complaint/reason for consultation:   · Concern for COVID infection      History of Present Illness:   Ajith Emmanuel is a 80y.o.-year-old  female who was initially admitted on 5/1/2021. Patient seen at the request of Margarita Huertas. INITIAL HISTORY:    Patient presented through ER with complaints of having diffuse weakness particularly of the lower extremities to the point that she could not get up, fatigue, fevers, dry cough. He reported a prior sinus infection about a week prior to her admission. She also reported some urinary incontinence with dribbling. Her evaluation in the emergency room did not show any pulmonary involvement by the chest x-ray.   However her COVID-19 test was positive on 5/1/2021 and she was found to be hyponatremic    Patient admitted because of concerns with COVID 19.    CURRENT EVALUATION : 5/8/2021    Patient was seen and evaluated sitting up in chair  Patient is currently on 4 L, was previously on 3 L oxygen per nasal cannula, but requirement did increase this morning when she got up from the bedside commode  No elevated temperatures    % FIO2:   PEEP:      QTc:       NEWS Score: 0-4 Low risk group; 5-6: Medium risk group; 7 or above: High risk group  Parameters 3 2 1 0 1 2 3   Age    < 65   ? 65   RR ? 8  9-11 12-20  21-24 ? 25   O2 Sats ? 91 92-93 94-95 ? 96      Suppl O2  Yes  No      SBP ? 90  101-110 111-219   ? 220   HR ? 40  41-50 51-90  111-130 ? 131   Consciousness    Alert   Drowsiness, lethargy, or confusion   Temperature ? 35.0 C (95.0 F)  35.1-36.0 C 95.1-96.9 F 36.1-38.0 C 97.0-100.4 F 38.1-39.0 C 100.5-102.3 F ? 39.1 C ? 102.4 F      NEWS Score:   5/1/2021: 11 high risk    Overall Daily Picture:    Improved    Presence of secondary bacterial Infection:  No   Additional antibiotics: No    Labs, X rays reviewed: 5/8/2021    BUN: 41-->38-->33  Cr: 1.28-->1.20-->1.47-->1.40> 1.27-->1.21-->1.19-> 1.1  Sodium 129-->129-->136    WBC: 9.5-->7.0-->6.9-->7.5-->11.5-->12.1-> 13.4  Hb: 10.4-->10.0-->8.7-->9.0-->8.9  Plat: 177-->152      CRP:175.3-->167.2-->93.9-->73.9    Cultures:  Urine:  ·   Blood:  · 5/1/2021 negative  Sputum :  ·   Wound:       CXR:   · 5/1/2021 no pulmonary involvement by chest x-ray  CAT:    I have personally reviewed the past medical history, past surgical history, medications, social history, and family history, and I have updated the database accordingly.   Past Medical History:     Past Medical History:   Diagnosis Date    Anemia     Anxiety     anxiety, depression    Depression     Fibromyalgia     Gastritis     GERD (gastroesophageal reflux disease)     Hyperlipidemia     Hypertension     Lumbar disc disease     LOW BACK PAIN INTERMITTENT/STATES LARGE BULGE    Neuropathy     left leg numbness    Numbness     LEFT LEG/KNEE TO ANKLE/WEAK LEG-USES WALKER     Osteoarthritis     Watermelon stomach 02/2016    Wears glasses        Past Surgical  History:     Past Surgical History:   Procedure Laterality Date    BACK SURGERY  6/17/13    micro [id number]    CATARACT REMOVAL      wesly cataract    CHOLECYSTECTOMY      HEMORRHOID SURGERY      NASAL ENDOSCOPY      IA ESOPHAGOGASTRODUODENOSCOPY TRANSORAL DIAGNOSTIC N/A 5/9/2017    EGD ESOPHAGOGASTRODUODENOSCOPY WITH APC performed by Kim Wadsworth MD at [facility]    UPPER GASTROINTESTINAL ENDOSCOPY         Medications:      insulin glargine  10 Units Subcutaneous Nightly    fluticasone  2 spray Nasal Daily    cetirizine  10 mg Oral Daily    sodium chloride flush  5-40 mL Intravenous 2 times per day    enoxaparin  30 mg Subcutaneous Daily    dexamethasone  6 mg Oral Daily    amLODIPine  5 mg Oral Daily       Social History:     Social History     Socioeconomic History    Marital status:      Spouse name: Not on file    Number of children: Not on file    Years of education: Not on file    Highest education level: Not on file   Occupational History    Not on file   Social Needs    Financial resource strain: Not on file    Food insecurity     Worry: Not on file     Inability: Not on file    Transportation needs     Medical: Not on file     Non-medical: Not on file   Tobacco Use    Smoking status: Former Smoker    Smokeless tobacco: Never Used    Tobacco comment: QUIT OVER 50 YRS AGO  6/17/13  Denies changes   Substance and Sexual Activity    Alcohol use: Yes     Comment: RARE DRINK    Drug use: No    Sexual activity: Not on file   Lifestyle    Physical activity     Days per week: Not on file     Minutes per session: Not on file    Stress: Not on file   Relationships    Social connections     Talks on phone: Not on file     Gets together: Not on file     Attends Alevism service: Not on file     Active member of club or organization: Not on file     Attends meetings of clubs or organizations: Not on file     Relationship status: Not on file    Intimate partner violence     Fear of current or ex partner: Not on file     Emotionally abused: Not on file     Physically abused: Not on file     Forced sexual activity: Not on file   Other Topics Concern    Not on file   Social History Narrative    Not on file       Family History:     Family History   Problem Relation Age of Onset    Kidney Disease Sister     Stroke Other     Diabetes Other         Allergies:   Augmentin [amoxicillin-pot clavulanate], Biaxin [clarithromycin], Amoxicillin, and Ciprofloxacin     Review of Systems:     Review of Systems   Constitutional: Negative. HENT: Negative. Respiratory: Negative. Cardiovascular: Negative. Gastrointestinal: Negative. Genitourinary: Negative. Musculoskeletal: Negative. Skin: Negative. Neurological: Negative. Psychiatric/Behavioral: Negative. Physical Examination :     Patient Vitals for the past 8 hrs:   BP Temp Temp src Pulse Resp SpO2   05/08/21 0750 118/65 98.5 °F (36.9 °C) Oral 66 23 95 %     Physical Exam  Constitutional:       General: She is not in acute distress. Appearance: Normal appearance. She is normal weight. HENT:      Head: Normocephalic and atraumatic. Cardiovascular:      Rate and Rhythm: Normal rate and regular rhythm. Pulmonary:      Effort: Pulmonary effort is normal. No respiratory distress. Breath sounds: Rales present. Abdominal:      General: Abdomen is flat. Bowel sounds are normal. There is no distension. Palpations: Abdomen is soft. Musculoskeletal: Normal range of motion. General: No swelling or tenderness. Skin:     General: Skin is warm and dry. Neurological:      General: No focal deficit present. Mental Status: She is alert and oriented to person, place, and time.  Mental status is at baseline. Psychiatric:         Mood and Affect: Mood normal.         Behavior: Behavior normal.         Thought Content: Thought content normal.           Medical Decision Making -Laboratory:   I have independently reviewed/ordered the following labs:    CBC with Differential:   Recent Labs     05/07/21  0633 05/08/21 0536   WBC 12.1* 13.4*   HGB 8.9* 9.5*   HCT 27.5* 30.0*    333   LYMPHOPCT 9* 6*   MONOPCT 11* 7     BMP:   Recent Labs     05/07/21  0633 05/08/21  0536    131*   K 4.2 4.8    97*   CO2 23 24   BUN 33* 35*   CREATININE 1.19* 1.11*     Hepatic Function Panel:   No results for input(s): PROT, LABALBU, BILIDIR, IBILI, BILITOT, ALKPHOS, ALT, AST in the last 72 hours. No results for input(s): RPR in the last 72 hours. No results for input(s): HIV in the last 72 hours. No results for input(s): BC in the last 72 hours. Lab Results   Component Value Date    MUCUS NOT REPORTED 05/01/2021    RBC 3.19 05/08/2021    TRICHOMONAS NOT REPORTED 05/01/2021    WBC 13.4 05/08/2021    YEAST NOT REPORTED 05/01/2021    TURBIDITY CLEAR 05/01/2021     Lab Results   Component Value Date    CREATININE 1.11 05/08/2021    GLUCOSE 214 05/08/2021       Medical Decision Making-Imaging:     No new imaging    Medical Decision Fioalv-Pxkjazyp-Bulon:     Culture, Blood 1 [[id number]] Collected: 05/01/21 1240   Order Status: Completed Specimen: Blood Updated: 05/07/21 0013    Specimen Description . BLOOD    Special Requests 10 RAC    Culture NO GROWTH 6 DAYS   Culture, Blood 1 [[id number]] Collected: 05/01/21 1253   Order Status: Completed Specimen: Blood Updated: 05/07/21 0013    Specimen Description . BLOOD    Special Requests 5ML LT FA    Culture NO GROWTH 6 DAYS       Thank you for allowing us to participate in the care of this patient. Please call with questions.     Burke Garrison, APRN - CNP

## 2021-05-08 NOTE — PROGRESS NOTES
CLINICAL PHARMACY NOTE: MEDS TO [address] Select Patient?: Yes  Total # of Prescriptions Filled: 1   The following medications were delivered to the patient:  · Dexamethasone 6mg  Total # of Interventions Completed: 0  Time Spent (min): 15    Additional Documentation:

## 2021-05-08 NOTE — PROGRESS NOTES
Writer updated patients daughter Demian Sigala. All questions were answered. Daughter also was notified of possible discharge today. Will update on time.

## 2021-05-08 NOTE — PROGRESS NOTES
Face to face encounter today indicate the requirement of DME order of  Oxygen for the diagnosis of the  Hypoxia due to COVID 19  Indication and side effects of treatment had been addressed with pt , pt agreeable to the current plan of using the DME

## 2021-05-08 NOTE — PROGRESS NOTES
finding. CT head was done as she reported a fall prior to presentation and was unremarkable. Review of Systems:     Constitutional: + chills, fevers, sweats,+myalgias. Respiratory:  Positive for sob  Cardiovascular:  negative for chest pain, chest pressure/discomfort, lower extremity edema, palpitations  Gastrointestinal:  negative for abdominal pain, constipation, diarrhea, nausea, vomiting  Neurological:  negative for dizziness, headache  Medications: Allergies: Allergies   Allergen Reactions    Augmentin [Amoxicillin-Pot Clavulanate] Shortness Of Breath, Photosensitivity and Nausea And Vomiting    Biaxin [Clarithromycin] Shortness Of Breath, Photosensitivity and Nausea And Vomiting    Amoxicillin Rash    Ciprofloxacin Photosensitivity, Nausea And Vomiting and Other (See Comments)     confusion       Current Meds:   Scheduled Meds:    insulin glargine  10 Units Subcutaneous Nightly    fluticasone  2 spray Nasal Daily    cetirizine  10 mg Oral Daily    sodium chloride flush  5-40 mL Intravenous 2 times per day    enoxaparin  30 mg Subcutaneous Daily    dexamethasone  6 mg Oral Daily    amLODIPine  5 mg Oral Daily     Continuous Infusions:    dextrose      sodium chloride       PRN Meds: glucose, dextrose, glucagon (rDNA), dextrose, sodium chloride, ALPRAZolam, nicotine, sodium chloride flush, sodium chloride, promethazine **OR** ondansetron, polyethylene glycol, acetaminophen **OR** acetaminophen    Data:     Past Medical History:   has a past medical history of Anemia, Anxiety, Depression, Fibromyalgia, Gastritis, GERD (gastroesophageal reflux disease), Hyperlipidemia, Hypertension, Lumbar disc disease, Neuropathy, Numbness, Osteoarthritis, Watermelon stomach, and Wears glasses. Social History:   reports that she has quit smoking. She has never used smokeless tobacco. She reports current alcohol use. She reports that she does not use drugs.      Family History:   Family History   Problem Relation Age of Onset    Kidney Disease Sister     Stroke Other     Diabetes Other        Vitals:  /65   Pulse 66   Temp 98.5 °F (36.9 °C) (Oral)   Resp 23   Ht 5' 4\" (1.626 m)   Wt 158 lb 4.6 oz (71.8 kg)   SpO2 95%   BMI 27.17 kg/m²   Temp (24hrs), Av.4 °F (36.9 °C), Min:98.2 °F (36.8 °C), Max:98.5 °F (36.9 °C)    No results for input(s): POCGLU in the last 72 hours. I/O (24Hr): Intake/Output Summary (Last 24 hours) at 2021 1249  Last data filed at 2021 1904  Gross per 24 hour   Intake 800 ml   Output 1100 ml   Net -300 ml       Labs:  Hematology:  Recent Labs     21  0621  0633 21  0536   WBC 11.6* 12.1* 13.4*   RBC 3.02* 2.96* 3.19*   HGB 9.0* 8.9* 9.5*   HCT 28.7* 27.5* 30.0*   MCV 95.0 92.9 94.0   MCH 29.8 30.1 29.8   MCHC 31.4 32.4 31.7   RDW 13.6 13.6 13.8    280 333   MPV 11.1 10.8 10.7     Chemistry:  Recent Labs     21  0631 21  0633 21  0536    136 131*   K 4.0 4.2 4.8    100 97*   CO2 24 23 24   GLUCOSE 210* 190* 214*   BUN 38* 33* 35*   CREATININE 1.21* 1.19* 1.11*   ANIONGAP 12 13 10   LABGLOM 42* 43* 46*   GFRAA 51* 52* 56*   CALCIUM 8.0* 7.9* 8.2*     No results for input(s): PROT, LABALBU, LABA1C, C0CKAWL, M5CJCON, FT4, TSH, AST, ALT, LDH, GGT, ALKPHOS, LABGGT, BILITOT, BILIDIR, AMMONIA, AMYLASE, LIPASE, LACTATE, CHOL, HDL, LDLCHOLESTEROL, CHOLHDLRATIO, TRIG, VLDL, JAE94DB, PHENYTOIN, PHENYF, URICACID, POCGLU in the last 72 hours. Radiology:  CT Head Wo Contrast  Result Date: 2021  No evidence of acute intracranial process. Xr Chest Portable  Result Date: 2021  No acute cardiopulmonary process.      Physical Examination:        General appearance:  alert, cooperative and no distress  Mental Status:  oriented to person, place and time and normal affect  Lungs:  B/l coarse BS  Heart:  regular rate and rhythm, no murmur  Abdomen:  soft, nontender, nondistended, normal bowel sounds   Extremities:  no edema, redness, tenderness in the calves  Skin:  no gross lesions, rashes, induration    Assessment:        Hospital Problems           Last Modified POA    * (Principal) Upper respiratory tract infection due to COVID-19 virus 5/2/2021 Yes    HTN (hypertension) (Chronic) 5/2/2021 Yes    Anxiety (Chronic) 5/2/2021 Yes    Depression (Chronic) 5/2/2021 Yes    Stage 3 chronic kidney disease 5/2/2021 Yes    GERD (gastroesophageal reflux disease) 5/2/2021 Yes    Fibromyalgia 5/2/2021 Yes          Plan:        -COVID-19 infection: continue  remdesivir and dexamethasone. Appreciate ID input. Nasal cannula O2 as needed to maintain O2 sats more than 95%. -Hyponatremia: resolved. Will discontinue fluids.   -Hypertension: Continue home dose of antihypertensives. -CKD stage III with baseline creatinine around 1.2-1.3 continue to monitor. -DVT/GI prophylaxis.   -Full CODE STATUS  Possible home o2 eval soon    Antionette Ortez MD  5/8/2021

## 2021-05-08 NOTE — PLAN OF CARE
Problem: Airway Clearance - Ineffective  Goal: Achieve or maintain patent airway  5/8/2021 1457 by Rene Herman RN  Outcome: Completed  5/8/2021 1457 by Rene Herman RN  Outcome: Met This Shift  5/8/2021 0605 by Henna Haji RN  Outcome: Ongoing     Problem: Gas Exchange - Impaired  Goal: Absence of hypoxia  5/8/2021 1457 by Rene Herman RN  Outcome: Completed  5/8/2021 1457 by Rene Herman RN  Outcome: Met This Shift  5/8/2021 0605 by Henna Haji RN  Outcome: Ongoing  Goal: Promote optimal lung function  5/8/2021 1457 by Rene Herman RN  Outcome: Completed  5/8/2021 1457 by Rene Herman RN  Outcome: Met This Shift  5/8/2021 0605 by Henna Haji RN  Outcome: Ongoing     Problem: Breathing Pattern - Ineffective  Goal: Ability to achieve and maintain a regular respiratory rate  5/8/2021 1457 by Rene Herman RN  Outcome: Completed  5/8/2021 1457 by Rene Herman RN  Outcome: Met This Shift  5/8/2021 0605 by Henna Haji RN  Outcome: Ongoing     Problem:  Body Temperature -  Risk of, Imbalanced  Goal: Ability to maintain a body temperature within defined limits  5/8/2021 1457 by Rene Herman RN  Outcome: Completed  5/8/2021 1457 by Rene Herman RN  Outcome: Met This Shift  5/8/2021 0605 by Henna Haji RN  Outcome: Ongoing  Goal: Will regain or maintain usual level of consciousness  5/8/2021 1457 by Rene Herman RN  Outcome: Completed  5/8/2021 1457 by Rene Herman RN  Outcome: Met This Shift  5/8/2021 0605 by Henna Haji RN  Outcome: Ongoing  Goal: Complications related to the disease process, condition or treatment will be avoided or minimized  5/8/2021 1457 by Rene Herman RN  Outcome: Completed  5/8/2021 1457 by Rene Herman RN  Outcome: Met This Shift  5/8/2021 0605 by Henna Haji RN  Outcome: Ongoing     Problem: Isolation Precautions - Risk of Spread of Infection  Goal: Prevent transmission of infection  5/8/2021 1457 by Belkys Ferrer RN  Outcome: Completed  5/8/2021 1457 by Belkys Ferrer RN  Outcome: Met This Shift  5/8/2021 0605 by Allan Gandara RN  Outcome: Ongoing     Problem: Nutrition Deficits  Goal: Optimize nutritional status  5/8/2021 1457 by Belkys Ferrer RN  Outcome: Completed  5/8/2021 1457 by Belkys Ferrer RN  Outcome: Met This Shift  5/8/2021 0605 by Allan Gandara RN  Outcome: Ongoing     Problem: Risk for Fluid Volume Deficit  Goal: Maintain normal heart rhythm  5/8/2021 1457 by Belkys Ferrer RN  Outcome: Completed  5/8/2021 1457 by Belkys Ferrer RN  Outcome: Met This Shift  5/8/2021 0605 by Allan Gandara RN  Outcome: Ongoing  Goal: Maintain absence of muscle cramping  5/8/2021 1457 by Belkys Ferrer RN  Outcome: Completed  5/8/2021 1457 by Belkys Ferrer RN  Outcome: Met This Shift  5/8/2021 0605 by Allan Gandara RN  Outcome: Ongoing  Goal: Maintain normal serum potassium, sodium, calcium, phosphorus, and pH  5/8/2021 1457 by Belkys Ferrer RN  Outcome: Completed  5/8/2021 1457 by Belkys Ferrer RN  Outcome: Met This Shift  5/8/2021 0605 by Allan Gandara RN  Outcome: Ongoing     Problem: Loneliness or Risk for Loneliness  Goal: Demonstrate positive use of time alone when socialization is not possible  5/8/2021 1457 by Belkys Ferrer RN  Outcome: Completed  5/8/2021 1457 by Belkys Ferrer RN  Outcome: Met This Shift  5/8/2021 0605 by Allan Gandara RN  Outcome: Ongoing     Problem: Fatigue  Goal: Verbalize increase energy and improved vitality  5/8/2021 1457 by Belkys Ferrer RN  Outcome: Completed  5/8/2021 1457 by Belkys Ferrer RN  Outcome: Met This Shift  5/8/2021 0605 by Allan Gandara RN  Outcome: Ongoing     Problem: Patient Education: Go to Patient Education Activity  Goal: Patient/Family Education  5/8/2021 1457 by Belkys Ferrer RN  Outcome: Completed  5/8/2021 1457 by Belkys Ferrer RN  Outcome: Met This Shift  5/8/2021 9907 by Chery Banks RN  Outcome: Ongoing     Problem: Falls - Risk of:  Goal: Will remain free from falls  Description: Will remain free from falls  5/8/2021 1457 by Tal Espino RN  Outcome: Completed  5/8/2021 1457 by Tal Espino RN  Outcome: Met This Shift  5/8/2021 0605 by Chery Banks RN  Outcome: Ongoing  Goal: Absence of physical injury  Description: Absence of physical injury  5/8/2021 1457 by Tal Espino RN  Outcome: Completed  5/8/2021 1457 by Tal Espino RN  Outcome: Met This Shift  5/8/2021 0605 by Chery Banks RN  Outcome: Ongoing     Problem: Skin Integrity:  Goal: Will show no infection signs and symptoms  Description: Will show no infection signs and symptoms  5/8/2021 1457 by Tal Espino RN  Outcome: Completed  5/8/2021 1457 by Tal Espino RN  Outcome: Met This Shift  5/8/2021 0605 by Chery Banks RN  Outcome: Ongoing  Goal: Absence of new skin breakdown  Description: Absence of new skin breakdown  5/8/2021 1457 by Tal Espino RN  Outcome: Completed  5/8/2021 1457 by Tal Espino RN  Outcome: Met This Shift  5/8/2021 0605 by Chery Banks RN  Outcome: Ongoing     Problem: Musculor/Skeletal Functional Status  Goal: Highest potential functional level  5/8/2021 1457 by Tal Espino RN  Outcome: Completed  5/8/2021 1457 by Tal Espino RN  Outcome: Met This Shift  5/8/2021 0605 by Chery Banks RN  Outcome: Ongoing  Goal: Absence of falls  5/8/2021 1457 by Tal Espino RN  Outcome: Completed  5/8/2021 1457 by Tal Espino RN  Outcome: Met This Shift  5/8/2021 0605 by Chery Banks RN  Outcome: Ongoing     Problem: Nutrition  Goal: Optimal nutrition therapy  Description: Nutrition Problem #1: No nutrition diagnosis at this time  Intervention: Food and/or Nutrient Delivery: Continue Current Diet  Nutritional Goals: PO intake to meet >75% of estimated nutrition needs      5/8/2021 1457 by Tal Espino RN  Outcome: Completed  5/8/2021 1457 by Rene Herman, RN  Outcome: Met This Shift  5/8/2021 0605 by Henna Haji RN  Outcome: Ongoing

## 2021-05-08 NOTE — PLAN OF CARE
Problem: Airway Clearance - Ineffective  Goal: Achieve or maintain patent airway  Outcome: Ongoing     Problem: Gas Exchange - Impaired  Goal: Absence of hypoxia  Outcome: Ongoing  Goal: Promote optimal lung function  Outcome: Ongoing     Problem: Breathing Pattern - Ineffective  Goal: Ability to achieve and maintain a regular respiratory rate  Outcome: Ongoing     Problem:  Body Temperature -  Risk of, Imbalanced  Goal: Ability to maintain a body temperature within defined limits  Outcome: Ongoing  Goal: Will regain or maintain usual level of consciousness  Outcome: Ongoing  Goal: Complications related to the disease process, condition or treatment will be avoided or minimized  Outcome: Ongoing     Problem: Isolation Precautions - Risk of Spread of Infection  Goal: Prevent transmission of infection  Outcome: Ongoing     Problem: Nutrition Deficits  Goal: Optimize nutritional status  Outcome: Ongoing     Problem: Risk for Fluid Volume Deficit  Goal: Maintain normal heart rhythm  Outcome: Ongoing  Goal: Maintain absence of muscle cramping  Outcome: Ongoing  Goal: Maintain normal serum potassium, sodium, calcium, phosphorus, and pH  Outcome: Ongoing     Problem: Loneliness or Risk for Loneliness  Goal: Demonstrate positive use of time alone when socialization is not possible  Outcome: Ongoing     Problem: Fatigue  Goal: Verbalize increase energy and improved vitality  Outcome: Ongoing     Problem: Patient Education: Go to Patient Education Activity  Goal: Patient/Family Education  Outcome: Ongoing     Problem: Falls - Risk of:  Goal: Will remain free from falls  Description: Will remain free from falls  Outcome: Ongoing  Goal: Absence of physical injury  Description: Absence of physical injury  Outcome: Ongoing     Problem: Skin Integrity:  Goal: Will show no infection signs and symptoms  Description: Will show no infection signs and symptoms  Outcome: Ongoing  Goal: Absence of new skin breakdown  Description: Absence of new skin breakdown  Outcome: Ongoing     Problem: Musculor/Skeletal Functional Status  Goal: Highest potential functional level  Outcome: Ongoing  Goal: Absence of falls  Outcome: Ongoing     Problem: Nutrition  Goal: Optimal nutrition therapy  Description: Nutrition Problem #1: No nutrition diagnosis at this time  Intervention: Food and/or Nutrient Delivery: Continue Current Diet  Nutritional Goals: PO intake to meet >75% of estimated nutrition needs      Outcome: Ongoing

## 2021-05-09 LAB
EKG ATRIAL RATE: 103 BPM
EKG P AXIS: 61 DEGREES
EKG P-R INTERVAL: 142 MS
EKG Q-T INTERVAL: 350 MS
EKG QRS DURATION: 90 MS
EKG QTC CALCULATION (BAZETT): 458 MS
EKG R AXIS: 35 DEGREES
EKG T AXIS: 17 DEGREES
EKG VENTRICULAR RATE: 103 BPM
ESTIMATED AVERAGE GLUCOSE: 146 MG/DL
HBA1C MFR BLD: 6.7 % (ref 4–6)

## 2021-05-09 NOTE — DISCHARGE SUMMARY
[facility]  Office: [address], DO, Shelly Dines, DO, Quiana Mariana, DO, Nan Lopez Blood, DO, Reynold Underwood MD, Lj Gibson MD, Winter Vivas MD, Javi Ybarra MD, Zofia Sheth MD, Lesvia Buchanan MD, Dayanna Plasencia MD, Eden Panchal MD, Isai Abdi, DO, Chitra Prieto MD, Jazmin Hodges DO, Talia Gray MD,  Ben England DO, Nahed Sherman MD, Tasia Jaime MD, Cynthia Jameson MD, Carmine Broussard MD, John Luevano, Dalila Scrape, CNP, Handy Amas, CNP, Jennifer Pulse, CNS, Kayy Halsted, CNP, Annel Dias, CNP, Anay Ayoub, CNP, Pilar Pulse, CNP, Teresa Nielsen, CNP, Rivka Huynh PA-C, [place] [facility], Crispin Jj, CNP, Rosetta Bennett, CNP, Aleta Mcfadden, CNP, Dennys Eason, CNP, Pancho Jasso, CNP, Ritu Boo, [facility]    Discharge Summary     Patient ID: Roscoe Cooper  :  1934   MRN: [id number]     ACCOUNT:  [de-identified]   Patient's PCP: Rebeca Hodgkins, MD  Admit Date: 2021   Discharge Date: 21    Length of Stay: 7  Code Status:  Prior  Admitting Physician: Chitra Prieto MD  Discharge Physician: Chitra Prieto MD     Active Discharge Diagnoses:     Hospital Problem Lists:  Principal Problem:    Upper respiratory tract infection due to COVID-19 virus  Active Problems:    HTN (hypertension)    Anxiety    Depression    Stage 3 chronic kidney disease    GERD (gastroesophageal reflux disease)    Fibromyalgia  Resolved Problems:    * No resolved hospital problems.  *      Admission Condition:  critical     Discharged Condition: stable    Hospital Stay:     Hospital Course:  Roscoe Cooper is a 80 y.o. female who was admitted for the management of   Upper respiratory tract infection due to COVID-19 virus , presented to ER with Fatigue, Chills, Extremity Weakness (bilateral legs), and Fever (100.4 oral)    80year-old with prior history of anxiety depression fibromyalgia GERD hypertension hyperlipoidemia present to the ED with complaints of fatigue lower extremity venous fever and dry coughx 5days. Was treated with 5 days of oral antibiotic for possible sinus infection about a week ago. ED work-up she was found to be febrile 100.4 tachypneic respiratory rate 33, elevated blood pressure 163/86, hyponatremic sodium of 129, creatinine 1.29 , Covid rapid test positive, chest x-ray was unremarkable for acute finding. CT head was done as she reported a fall prior to presentation and was unremarkable. Needing 3 L of oxygen, home oxygen evaluation done,  Patient will follow with primary care physician    Review of system:  Denies any nausea vomiting fever chills,  Denies any headaches or blurred vision,  Denies any cough phlegm hemoptysis,  Denies any abdominal pain diarrhea constipation,  Denies any tingling tingling numbness weakness of arms or legs,   Skin no rash,    On examination,  Alert awake oriented x3,  S1-S2 present,  Bilateral coarse breath sounds  Abdomen soft nontender nondistended bowel sounds present   Extremity no edema no calf tenderness,,  Skin no rash  CNS no focal neurological deficits      Significant therapeutic interventions:   COVID-19 infection: continue  remdesivir and dexamethasone. Appreciate ID input. Nasal cannula O2 as needed to maintain O2 sats more than 95%. -Hyponatremia: resolved. Will discontinue fluids.   -Hypertension: Continue home dose of antihypertensives. -CKD stage III with baseline creatinine around 1.2-1.3 continue to monitor. -DVT/GI prophylaxis.   -Full CODE STATUS  Home with home oxygen  Significant Diagnostic Studies:   Labs / Micro:  CBC:   Lab Results   Component Value Date    WBC 13.4 05/08/2021    RBC 3.19 05/08/2021    HGB 9.5 05/08/2021    HCT 30.0 05/08/2021    MCV 94.0 05/08/2021    MCH 29.8 05/08/2021    MCHC 31.7 05/08/2021    RDW 13.8 05/08/2021     05/08/2021     BMP:    Lab Results patient was seen and examined on day of discharge and this discharge summary is in conjunction with any daily progress note from day of discharge. Discharge plan:     Disposition: Home with Piyush Garber     Physician Follow Up: Alejandro Rodney MD  In 5 days   [address] [facility] [address] [facility] [address]  [address]  [phone]    Call  Call to be seen for hospital follow up visit in 2 days. [address] [place] [id number] [phone]           Requiring Further Evaluation/Follow Up POST HOSPITALIZATION/Incidental Findings:     Diet: low fat, low cholesterol diet    Activity: As tolerated    Instructions to Patient:     Discharge Medications:      Medication List      START taking these medications    dexamethasone 6 MG tablet  Commonly known as: DECADRON  Take 1 tablet by mouth daily for 2 days        CONTINUE taking these medications    acetaminophen 500 MG tablet  Commonly known as: TYLENOL     ALPRAZolam 0.5 MG tablet  Commonly known as: XANAX     amLODIPine 10 MG tablet  Commonly known as: NORVASC     fluticasone 50 MCG/ACT nasal spray  Commonly known as: Flonase  2 sprays by Nasal route daily     hydroCHLOROthiazide 25 MG tablet  Commonly known as: HYDRODIURIL  Take 1 tablet by mouth daily.      loratadine 10 MG tablet  Commonly known as: Claritin  Take 1 tablet by mouth daily     NONFORMULARY     nortriptyline 25 MG capsule  Commonly known as: PAMELOR     ondansetron 4 MG disintegrating tablet  Commonly known as: Zofran ODT  Take 1 tablet by mouth every 8 hours as needed for Nausea     potassium chloride 10 MEQ extended release tablet  Commonly known as: KLOR-CON M  Take 1 tablet by mouth daily for 15 days           Where to Get Your Medications      These medications were sent to [place] [address] [address] [facility] [address] [place] [address]    Phone: [phone]   · dexamethasone 6 MG tablet         Discharge Procedure Orders   DME Order for [facility] as OP   Order Comments: You must complete the order parameters below and add the medical necessity documentation for this DME in a separate note. Folding Walker with Wheels    Current patient weight: Weight: 158 lb 4.6 oz (71.8 kg)  Current patient height: Height: 5' 4\" (162.6 cm)  Diagnosis: covid 19 debility  Duration: Purchase       Time Spent on discharge is  37 mins in patient examination, evaluation, counseling as well as medication reconciliation, prescriptions for required medications, discharge plan and follow up. Electronically signed by   Bambi Hinojosa MD  5/9/2021  12:35 PM      Thank you Dr. Cole Coats MD for the opportunity to be involved in this patient's care.

## 2021-05-10 ENCOUNTER — CARE COORDINATION (OUTPATIENT)
Dept: CASE MANAGEMENT | Age: 86
End: 2021-05-10

## 2021-05-10 NOTE — CARE COORDINATION
[address] [facility] Initial Follow Up Call    Call within 2 business days of discharge: Yes    Patient: Alba Jaramillo Patient : 1934   MRN: [id number]  Reason for Admission: BPCI-A Medical Bundle Patient:  Working DR Pneumonia  Admitting Location: [facility]  Adm Date: 21  Discharge Date: 21 RARS: Readmission Risk Score: 21      Last Discharge [facility]       Complaint Diagnosis Description Type Department Provider    21 Fatigue; Chills; Extremity Weakness; Fever COVID-19 ED to Hosp-Admission (Discharged) (ADMITTED) [facility] CAR 3 Zoran Carr MD; Noemí Allen. .. Spoke with: Patient    Facility: [facility]    Non-face-to-face services provided:  Scheduled appointment with PCP-Patient to call for VV with PCP  Obtained and reviewed discharge summary and/or continuity of care documents     Spoke with patient, explained she is part of a Medicare initiative, BP for her COVID PNE. Patient is feeling weak and fatigued and does c/o CALZADA but recovers with rest.  She is currently wearing O2 at 3Lmin. States her whole family has been diagnosed with COVID. She has [facility] home care that is following, nurse to be out this afternoon. She took last dose of Decadron today. She states she has not taken Pamelor in about a year, I did flag this for removal.  She denies any further needs or concerns. Was this an external facility discharge? No Discharge Facility: [facility]    Challenges to be reviewed by the provider   Additional needs identified to be addressed with provider No  none             Method of communication with provider : none    Discussed COVID-19 related testing which was available at this time. Test results were positive. Patient informed of results, if available? Yes    Advance Care Planning:   Does patient have an Advance Directive:  reviewed and current. Was this a readmission?  No  Patient stated reason for admission: PNE  Patients top risk factors for readmission: for next call: Routine follow up  CTN provided contact information for future needs. Care Transitions 24 Hour Call    Schedule Follow Up Appointment with PCP: Completed  Do you have any ongoing symptoms?: Yes  Patient-reported symptoms: Fatigue, Weakness, Other (Comment: foggy headed)  Do you have a copy of your discharge instructions?: Yes  Do you have all of your prescriptions and are they filled?: Yes  Have you been contacted by a [address]?: No  Have you scheduled your follow up appointment?: No  Were you discharged with any Home Care or Post Acute Services: Yes  Post Acute Services: Home Health (Comment: [facility])  Do you feel like you have everything you need to keep you well at home?: Yes  Care Transitions Interventions         Follow Up  No future appointments.     Zoila Mcdaniel RN

## 2021-05-19 ENCOUNTER — CARE COORDINATION (OUTPATIENT)
Dept: CASE MANAGEMENT | Age: 86
End: 2021-05-19

## 2021-05-19 NOTE — CARE COORDINATION
[facility] Transitions Follow Up Call    2021    Patient: Yordy Espinoza  Patient : 1934   MRN: <[id number]>  Reason for Admission:   Discharge Date: 21 RARS: Readmission Risk Score: 21      Spoke with: Patient, Lore Don    Patient states she is still feeling very fatigued most of the time. Gets SOB with exertion and is wearing oxygen continuously at 3/L. Denies, Chest pain, fever & chills, loss of taste or smell, dizziness, N/V/D, Pain and body aches, confusion. Patient confirms has all medications and is taking as directed. Patient has a good appetite and is drinking adequate fluids. Normal bladder and bowel elimination patterns. Home Health care coming out a few times a week per patient. Is now getting Meals on Wheels. Patient is aware of when to contact MD with any new or worsening symptoms. Patient has no needs or concerns for writer at this time. Will continue to follow. Drew Biswas LPN    [phone]  Ethan Grey / Mitesh Michelle 50 Transitions Subsequent and Final Call    Subsequent and Final Calls  Do you have any ongoing symptoms?: Yes  Patient-reported symptoms: Fatigue, Shortness of Breath  Have your medications changed?: No  Do you have any questions related to your medications?: No  Do you currently have any active services?: Yes  Are you currently active with any services?: Home Health  Do you have any needs or concerns that I can assist you with?: No  Identified Barriers: None  Care Transitions Interventions  Other Interventions: Follow Up  No future appointments.     Drew Biswas LPN

## 2021-05-26 ENCOUNTER — CARE COORDINATION (OUTPATIENT)
Dept: CASE MANAGEMENT | Age: 86
End: 2021-05-26

## 2021-06-02 ENCOUNTER — CARE COORDINATION (OUTPATIENT)
Dept: CASE MANAGEMENT | Age: 86
End: 2021-06-02

## 2021-06-02 NOTE — CARE COORDINATION
[facility] Transitions Follow Up Call    2021    Patient: Paris Birmingham  Patient : 1934   MRN: <[id number]>  Reason for Admission:   Discharge Date: 21 RARS: Readmission Risk Score: 21         Spoke with: Estefania Portillo, patient    Contacted patient for BPCI-A follow up. Justin Hung stated that she is doing pretty good. She stated she may still get tired at times. Breathing has improved. She is using 2 L of oxygen now. She stated she went w/o her oxygen for 1.5 hours the other day. She contacted her doctor to let him know. She will be having another follow up in one month. She denies having any c/o chest pain/discomfort, increased shortness of breath, cough, fever, chills. She does not have any questions or concerns at this time. Care Transitions Subsequent and Final Call    Subsequent and Final Calls  Do you have any ongoing symptoms?: Yes  Patient-reported symptoms: Other  Do you currently have any active services?: Yes  Are you currently active with any services?: Home Health  Do you have any needs or concerns that I can assist you with?: No  Identified Barriers: None  Care Transitions Interventions  Other Interventions: Follow Up  No future appointments.     Luis Gaston RN

## 2021-06-11 ENCOUNTER — CARE COORDINATION (OUTPATIENT)
Dept: CASE MANAGEMENT | Age: 86
End: 2021-06-11

## 2021-06-11 NOTE — CARE COORDINATION
[facility] Transitions Follow Up Call    2021    Patient: Ajith Emmanuel  Patient : 1934   MRN: <[id number]>  Reason for Admission: COVID-19  Discharge Date: 21 RARS: Readmission Risk Score: 21    Attempted to contact patient for BPCI-A call. Contact information left to  requesting call back at the earliest convenience. Follow Up  No future appointments.     Garfield Colmenares RN

## 2021-06-17 ENCOUNTER — CARE COORDINATION (OUTPATIENT)
Dept: CASE MANAGEMENT | Age: 86
End: 2021-06-17

## 2021-06-17 NOTE — CARE COORDINATION
Attempted to reach pt for BPCI-A follow up call. Left message requesting call back.     Karlo Bush RN  Care Transition Coordinator  [phone]

## 2021-06-24 ENCOUNTER — CARE COORDINATION (OUTPATIENT)
Dept: CASE MANAGEMENT | Age: 86
End: 2021-06-24

## 2021-06-24 NOTE — CARE COORDINATION
[facility] Transitions Follow Up Call    2021    Patient: Roscoe Cooper  Patient : 1934   MRN: <[id number]>  Reason for Admission:   Discharge Date: 21 RARS: Readmission Risk Score: 21    Attempted to contact patient for BPCI-A follow up. Unable to reach patient. Left message with contact information and request for call back. Follow Up  No future appointments.     Irwin Conrad RN

## 2021-06-25 ENCOUNTER — CARE COORDINATION (OUTPATIENT)
Dept: CASE MANAGEMENT | Age: 86
End: 2021-06-25

## 2021-06-25 NOTE — CARE COORDINATION
[facility] Transitions Follow Up Call    2021    Patient: Antionette Gill  Patient : 1934   MRN: <[id number]>  Reason for Admission:   Discharge Date: 21 RARS: Readmission Risk Score: 21    Received return phone call from patient. Wil Bass left message stating she was returning phone call. She can be reached at [phone]. CTN attempted to call patient. No answer. CTN did not leave a second message. Will try again at a later time. Follow Up  No future appointments.     Caitlin Tellez RN

## 2021-06-25 NOTE — CARE COORDINATION
[facility] Transitions Follow Up Call    2021    Patient: Vera Setting  Patient : 1934   MRN: <[id number]>  Reason for Admission:   Discharge Date: 21 RARS: Readmission Risk Score: 21         Spoke with: Elvira Stack, patient    Received phone call from Seymour Nyhan. She stated that she is doing better than she had been. Reports breathing has improved. No longer using oxygen. She denies having any c/o chest pain/discomfort, pressure, tightness, cough, fever, chills. She stated her anxiety is what has been giving her problems. She informed CTN that her anxiety has worsened since COVID. She stated she has good days and bad days. She is having a good day today. She is using CBD oil for the anxiety. She reports having swelling in her feet and ankles. She stated she saw her doctor on Monday and was told to wear compression stockings or wrap with ace wrap. She stated she is wrapping her legs. She is keeping her legs elevated as much as possible. Discussed when to contact her doctor with any new or worsening symptoms. She verbalized understanding and stated she will monitor the swelling. She stated home health gave her a tablet and has been monitoring her closely including her weight. Home health nurse will be making one last visit next week. Appetite has been improving. She is trying to stay hydrated. She confirmed she has everything that she needs right now. No questions or concerns for CTN at this time. Will continue to follow.       Care Transitions Subsequent and Final Call    Subsequent and Final Calls  Do you have any ongoing symptoms?: Yes  Patient-reported symptoms: Other  Have your medications changed?: No  Do you have any questions related to your medications?: No  Do you currently have any active services?: Yes  Are you currently active with any services?: Home Health  Do you have any needs or concerns that I can assist you with?: No  Identified Barriers: None  Care Transitions Interventions  Other Interventions: Follow Up  No future appointments.     Ivana Silva RN

## 2021-07-06 ENCOUNTER — CARE COORDINATION (OUTPATIENT)
Dept: CASE MANAGEMENT | Age: 86
End: 2021-07-06

## 2021-07-06 NOTE — CARE COORDINATION
[facility] Transitions Follow Up Call    2021    Patient: Tip Dalton  Patient : 1934   MRN: <[id number]>  Reason for Admission: COVID-19  Discharge Date: 21 RARS: Readmission Risk Score: 21    Attempted to contact patient for BPCI-A call. Contact information left to  requesting call back at the earliest convenience. Follow Up  No future appointments.     Rosemarie Burks RN

## 2021-07-15 ENCOUNTER — CARE COORDINATION (OUTPATIENT)
Dept: CASE MANAGEMENT | Age: 86
End: 2021-07-15

## 2021-07-15 NOTE — CARE COORDINATION
[facility] Transitions Follow Up Call    7/15/2021    Patient: Erinn Cancer  Patient : 1934   MRN: <[id number]>  Reason for Admission:   Discharge Date: 21 RARS: Readmission Risk Score: 21         Spoke with: [place] Johanna, patient    Contacted patient for BPCI-A follow up. Jackelin Santana stated that she is \"doing good-about the same\". Reports she is still tired and having decreased energy. She stated it's been a gradual decrease for a few years now. Reports she was having stomach issues-constipation. She stated she took Miralax as advised by her doctor. She is having regular bowel movements now. She stated constipation comes and goes. Encouraged to drink water. She stated she is trying to increase her fluid intake. Jackelin Santana reports she is having decreased appetite and not eating much. PCP recommended she try Ensure. Also discussed trying eating small snacks throughout the day. She verbalized understanding. She denies having any c/o chest pain/discomfort, increased shortness of breath, cough, fever, chills. She reports swelling has decreased since wearing her support stockings. Anxiety still \"comes and goes\". Stated \"some days are better than others\". She is aware of when to contact her doctor with any new or worsening symptoms. She confirmed she has all of her medications. No needs or concerns for CTN at this time. Will continue to follow. Care Transitions Subsequent and Final Call    Subsequent and Final Calls  Have your medications changed?: No  Do you have any questions related to your medications?: No  Do you currently have any active services?: No  Are you currently active with any services?: Home Health  Do you have any needs or concerns that I can assist you with?: No  Care Transitions Interventions  Other Interventions: Follow Up  No future appointments.     Mya Maria RN

## 2021-08-03 ENCOUNTER — CARE COORDINATION (OUTPATIENT)
Dept: CASE MANAGEMENT | Age: 86
End: 2021-08-03

## 2021-08-03 NOTE — CARE COORDINATION
[facility] Transitions Follow Up Call    8/3/2021    Patient: Genesis Elizabeth  Patient : 1934   MRN: <[id number]>  Reason for Admission:   Discharge Date: 21 RARS: Readmission Risk Score: 21       Spoke with: Ja Scherer  Patient reports she is better but not 100%. Appetite is coming back. Fluid intake is good. She has regular bladder and bowel elimination. Patient still tires easily. She denies fever, chills, dizziness, cp, nv or swelling. Patient informed this is the final call. No questions or concerns voiced. Contact information provided. Patient told to contact PCP if any medical issues arise. No further outreach scheduled. Care Transitions Subsequent and Final Call    Subsequent and Final Calls  Do you have any ongoing symptoms?: No  Have your medications changed?: No  Do you have any questions related to your medications?: No  Do you currently have any active services?: Yes  Are you currently active with any services?: Home Health  Do you have any needs or concerns that I can assist you with?: No  Identified Barriers: None  Care Transitions Interventions  Other Interventions: Follow Up  No future appointments.     Fernando Stephen LPN

## 2023-03-04 ENCOUNTER — APPOINTMENT (OUTPATIENT)
Dept: GENERAL RADIOLOGY | Age: 88
End: 2023-03-04
Payer: MEDICARE

## 2023-03-04 ENCOUNTER — HOSPITAL ENCOUNTER (EMERGENCY)
Age: 88
Discharge: HOME | End: 2023-03-04
Attending: EMERGENCY MEDICINE
Payer: MEDICARE

## 2023-03-04 VITALS
DIASTOLIC BLOOD PRESSURE: 68 MMHG | WEIGHT: 140 LBS | HEIGHT: 63 IN | HEART RATE: 102 BPM | TEMPERATURE: 98.9 F | BODY MASS INDEX: 24.8 KG/M2 | RESPIRATION RATE: 12 BRPM | OXYGEN SATURATION: 96 % | SYSTOLIC BLOOD PRESSURE: 138 MMHG

## 2023-03-04 DIAGNOSIS — M25.461 KNEE EFFUSION, RIGHT: Primary | ICD-10-CM

## 2023-03-04 PROCEDURE — 99283 EMERGENCY DEPT VISIT LOW MDM: CPT

## 2023-03-04 PROCEDURE — 73562 X-RAY EXAM OF KNEE 3: CPT

## 2023-03-04 PROCEDURE — 6370000000 HC RX 637 (ALT 250 FOR IP): Performed by: EMERGENCY MEDICINE

## 2023-03-04 RX ORDER — PREDNISONE 50 MG/1
50 TABLET ORAL DAILY
Qty: 5 TABLET | Refills: 0 | Status: SHIPPED | OUTPATIENT
Start: 2023-03-04 | End: 2023-03-09

## 2023-03-04 RX ORDER — TRAMADOL HYDROCHLORIDE 50 MG/1
50 TABLET ORAL EVERY 6 HOURS PRN
Qty: 12 TABLET | Refills: 0 | Status: SHIPPED | OUTPATIENT
Start: 2023-03-04 | End: 2023-03-07

## 2023-03-04 RX ORDER — PREDNISONE 50 MG/1
50 TABLET ORAL DAILY
Qty: 5 TABLET | Refills: 0 | Status: SHIPPED | OUTPATIENT
Start: 2023-03-04 | End: 2023-03-04 | Stop reason: SDUPTHER

## 2023-03-04 RX ORDER — PREDNISONE 20 MG/1
60 TABLET ORAL ONCE
Status: COMPLETED | OUTPATIENT
Start: 2023-03-04 | End: 2023-03-04

## 2023-03-04 RX ORDER — TRAMADOL HYDROCHLORIDE 50 MG/1
50 TABLET ORAL EVERY 6 HOURS PRN
Qty: 12 TABLET | Refills: 0 | Status: SHIPPED | OUTPATIENT
Start: 2023-03-04 | End: 2023-03-04 | Stop reason: SDUPTHER

## 2023-03-04 RX ADMIN — PREDNISONE 60 MG: 20 TABLET ORAL at 16:18

## 2023-03-04 ASSESSMENT — ENCOUNTER SYMPTOMS
COUGH: 0
RHINORRHEA: 0
SORE THROAT: 0
VOMITING: 0
EYE DISCHARGE: 0
COLOR CHANGE: 0
DIARRHEA: 0
NAUSEA: 0
SHORTNESS OF BREATH: 0
EYE REDNESS: 0

## 2023-03-04 ASSESSMENT — PAIN - FUNCTIONAL ASSESSMENT: PAIN_FUNCTIONAL_ASSESSMENT: 0-10

## 2023-03-04 ASSESSMENT — PAIN DESCRIPTION - PAIN TYPE: TYPE: ACUTE PAIN

## 2023-03-04 ASSESSMENT — PAIN DESCRIPTION - DESCRIPTORS: DESCRIPTORS: SHARP;SHOOTING

## 2023-03-04 ASSESSMENT — PAIN DESCRIPTION - ORIENTATION: ORIENTATION: RIGHT

## 2023-03-04 ASSESSMENT — PAIN SCALES - GENERAL: PAINLEVEL_OUTOF10: 10

## 2023-03-04 ASSESSMENT — PAIN DESCRIPTION - LOCATION: LOCATION: KNEE

## 2023-03-04 ASSESSMENT — PAIN DESCRIPTION - FREQUENCY: FREQUENCY: CONTINUOUS

## 2023-03-04 NOTE — ED PROVIDER NOTES
EMERGENCY DEPARTMENT ENCOUNTER    Pt Name: Neeru Luz  MRN: [id number]  [place] 4/22/1934  Date of evaluation: 3/4/23  CHIEF COMPLAINT       Chief Complaint   Patient presents with    Knee Pain     Right knee, Thursday on knees cleaning  and has been painful since, this morning was too painful to weight bear. HISTORY OF PRESENT ILLNESS   61-year-old male presents with complaints of right knee pain and swelling. Patient bent down to pick something up off the floor and clean something up a few days ago when she got up she felt some increased pain and has had increased pain and swelling. Patient describes her symptoms as severe. REVIEW OF SYSTEMS     Review of Systems   Constitutional:  Negative for chills and fever. HENT:  Negative for rhinorrhea and sore throat. Eyes:  Negative for discharge, redness and visual disturbance. Respiratory:  Negative for cough and shortness of breath. Cardiovascular:  Negative for chest pain, palpitations and leg swelling. Gastrointestinal:  Negative for diarrhea, nausea and vomiting. Genitourinary:  Negative for dysuria and hematuria. Musculoskeletal:  Negative for arthralgias, myalgias and neck pain. Right knee pain and swelling   Skin:  Negative for color change and rash. Neurological:  Negative for seizures, weakness and headaches. Psychiatric/Behavioral:  Negative for hallucinations, self-injury and suicidal ideas.     PASTMEDICAL HISTORY     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    Wears glasses      Past Problem List  Patient Active Problem List   Diagnosis Code    S/P lumbar microdiscectomy Z98.890    HTN (hypertension) I10    Hyperlipidemia E78.5    Anxiety F41.9    Depression F32. A    Stage 3 chronic kidney disease (HCC) N18.30    GERD (gastroesophageal reflux disease) K21.9    Chest pain R07.9    Anemia D64.9    Upper respiratory tract infection due to COVID-19 virus U07.1, J06.9    Fibromyalgia M79.7     SURGICAL HISTORY       Past Surgical History:   Procedure Laterality Date    BACK SURGERY  13    micro [id number]    CATARACT REMOVAL      wesly cataract    CHOLECYSTECTOMY      HEMORRHOID SURGERY      NASAL ENDOSCOPY      AR ESOPHAGOGASTRODUODENOSCOPY TRANSORAL DIAGNOSTIC N/A 2017    EGD ESOPHAGOGASTRODUODENOSCOPY WITH APC performed by Jenelle Palencia MD at [facility] [place]       Previous Medications    ACETAMINOPHEN (TYLENOL) 500 MG TABLET    Take 500 mg by mouth 3 times daily    ALPRAZOLAM (XANAX) 0.5 MG TABLET    Take 0.25 mg by mouth nightly as needed for Sleep. Take 1/2 of 0.5mg tablet    AMLODIPINE (NORVASC) 10 MG TABLET    Take 5 mg by mouth daily     FLUTICASONE (FLONASE) 50 MCG/ACT NASAL SPRAY    2 sprays by Nasal route daily    HYDROCHLOROTHIAZIDE (HYDRODIURIL) 25 MG TABLET    Take 1 tablet by mouth daily. LORATADINE (CLARITIN) 10 MG TABLET    Take 1 tablet by mouth daily    NONFORMULARY    CBD oil prn    NORTRIPTYLINE (PAMELOR) 25 MG CAPSULE    Take 10 mg by mouth daily     ONDANSETRON (ZOFRAN ODT) 4 MG DISINTEGRATING TABLET    Take 1 tablet by mouth every 8 hours as needed for Nausea    POTASSIUM CHLORIDE (KLOR-CON M) 10 MEQ EXTENDED RELEASE TABLET    Take 1 tablet by mouth daily for 15 days     ALLERGIES     is allergic to augmentin [amoxicillin-pot clavulanate], biaxin [clarithromycin], amoxicillin, and ciprofloxacin. FAMILY HISTORY     She indicated that her sister is . She indicated that her other is .      SOCIAL HISTORY       Social History Tobacco Use    Smoking status: Former    Smokeless tobacco: Never    Tobacco comments:     QUIT OVER 50 YRS AGO  6/17/13  Denies changes   Vaping Use    Vaping Use: Never used   Substance Use Topics    Alcohol use: Yes     Comment: RARE DRINK    Drug use: No     PHYSICAL EXAM     INITIAL VITALS: /68   Pulse (!) 102   Temp 98.9 °F (37.2 °C)   Resp 12   Ht 5' 3\" (1.6 m)   Wt 140 lb (63.5 kg)   SpO2 96%   BMI 24.80 kg/m²    Physical Exam  Constitutional:       Appearance: Normal appearance. HENT:      Head: Normocephalic and atraumatic. Eyes:      Extraocular Movements: Extraocular movements intact. Pupils: Pupils are equal, round, and reactive to light. Cardiovascular:      Rate and Rhythm: Normal rate and regular rhythm. Pulmonary:      Effort: Pulmonary effort is normal.      Breath sounds: Normal breath sounds. Abdominal:      General: Abdomen is flat. Palpations: Abdomen is soft. Tenderness: There is no abdominal tenderness. Musculoskeletal:        Legs:    Neurological:      Mental Status: She is alert. MEDICAL DECISION MAKING / ED COURSE:   Summary of Patient Presentation:    Right knee pain and effusion    1)  Number and Complexity of Problems  Problem List This Visit: Right knee pain    Differential Diagnosis: Trauma, fracture, sprain, strain    Diagnoses Considered but Do Not Suspect: Gout, septic arthritis    Pertinent Comorbid Conditions: None    2)  Data Reviewed  Imaging that is independently reviewed and interpreted by me as: X-ray shows no evidence of fracture    See more data below for the lab and radiology tests and orders. 3)  Treatment and Disposition    Patient's x-ray unremarkable, patient will be discharged with pain control steroids outpatient follow-up and return if symptoms worsen or change. Shared Decision Making: The patient was involved in his/her plan of care through shared decision making.  The testing that was ordered was discussed with the patient. Any medications that may have been ordered were discussed with the patient    Code Status Discussion:      \"ED Course\" Notes From Epic Narrator:         CRITICAL CARE:       PROCEDURES:  Procedures      DATA FOR LAB AND RADIOLOGY TESTS ORDERED BELOW ARE REVIEWED BY THE ED CLINICIAN:    RADIOLOGY: All x-rays, CT, MRI, and formal ultrasound images (except ED bedside ultrasound) are read by the radiologist, see reports below, unless otherwise noted in MDM or here. Reports below are reviewed by myself. XR KNEE RIGHT (3 VIEWS)   Final Result   1. No acute bony or joint abnormality   2. Tricompartmental osteoarthritic changes             LABS: Lab orders shown below, the results are reviewed by myself, and all abnormals are listed below. Labs Reviewed - No data to display    Vitals Reviewed:    Vitals:    03/04/23 1315   BP: 138/68   Pulse: (!) 102   Resp: 12   Temp: 98.9 °F (37.2 °C)   SpO2: 96%   Weight: 140 lb (63.5 kg)   Height: 5' 3\" (1.6 m)     MEDICATIONS GIVEN TO PATIENT THIS ENCOUNTER:  Orders Placed This Encounter   Medications    predniSONE (DELTASONE) 50 MG tablet     Sig: Take 1 tablet by mouth daily for 5 days     Dispense:  5 tablet     Refill:  0    predniSONE (DELTASONE) tablet 60 mg    traMADol (ULTRAM) 50 MG tablet     Sig: Take 1 tablet by mouth every 6 hours as needed for Pain for up to 3 days. Intended supply: 3 days. Take lowest dose possible to manage pain Max Daily Amount: 200 mg     Dispense:  12 tablet     Refill:  0     DISCHARGE PRESCRIPTIONS:  New Prescriptions    PREDNISONE (DELTASONE) 50 MG TABLET    Take 1 tablet by mouth daily for 5 days    TRAMADOL (ULTRAM) 50 MG TABLET    Take 1 tablet by mouth every 6 hours as needed for Pain for up to 3 days. Intended supply: 3 days.  Take lowest dose possible to manage pain Max Daily Amount: 200 mg     PHYSICIAN CONSULTS ORDERED THIS ENCOUNTER:  None  FINAL IMPRESSION      1. Knee effusion, right          DISPOSITION/PLAN DISPOSITION Decision To Discharge 03/04/2023 04:11:57 PM      OUTPATIENT FOLLOW UP THE PATIENT:  Piotr Mei MD  [address] [place] [address] [facility]  [phone]    Schedule an appointment as soon as possible for a visit in 2 days      Marinell Pallas, MD Marinell Pallas, MD  03/04/23 9172

## 2023-03-16 ENCOUNTER — HOSPITAL ENCOUNTER (OUTPATIENT)
Age: 88
Setting detail: SPECIMEN
Discharge: HOME | End: 2023-03-16

## 2023-03-16 ENCOUNTER — OFFICE VISIT (OUTPATIENT)
Dept: PRIMARY CARE CLINIC | Age: 88
End: 2023-03-16
Payer: MEDICARE

## 2023-03-16 VITALS
TEMPERATURE: 98.7 F | OXYGEN SATURATION: 97 % | SYSTOLIC BLOOD PRESSURE: 130 MMHG | HEART RATE: 95 BPM | WEIGHT: 140 LBS | BODY MASS INDEX: 24.8 KG/M2 | DIASTOLIC BLOOD PRESSURE: 77 MMHG

## 2023-03-16 DIAGNOSIS — R35.0 FREQUENT URINATION: Primary | ICD-10-CM

## 2023-03-16 LAB
BILIRUBIN, POC: NEGATIVE
BLOOD URINE, POC: NEGATIVE
CLARITY, POC: CLEAR
COLOR, POC: YELLOW
GLUCOSE URINE, POC: NEGATIVE
KETONES, POC: NEGATIVE
LEUKOCYTE EST, POC: NEGATIVE
NITRITE, POC: NEGATIVE
PH, POC: 6.5
PROTEIN, POC: NORMAL
SPECIFIC GRAVITY, POC: 1.01
UROBILINOGEN, POC: 0.2

## 2023-03-16 PROCEDURE — G8420 CALC BMI NORM PARAMETERS: HCPCS

## 2023-03-16 PROCEDURE — 1123F ACP DISCUSS/DSCN MKR DOCD: CPT

## 2023-03-16 PROCEDURE — 1036F TOBACCO NON-USER: CPT

## 2023-03-16 PROCEDURE — 1090F PRES/ABSN URINE INCON ASSESS: CPT

## 2023-03-16 PROCEDURE — 99203 OFFICE O/P NEW LOW 30 MIN: CPT

## 2023-03-16 PROCEDURE — 81002 URINALYSIS NONAUTO W/O SCOPE: CPT

## 2023-03-16 PROCEDURE — G8427 DOCREV CUR MEDS BY ELIG CLIN: HCPCS

## 2023-03-16 PROCEDURE — G8484 FLU IMMUNIZE NO ADMIN: HCPCS

## 2023-03-16 ASSESSMENT — ENCOUNTER SYMPTOMS
WHEEZING: 0
SINUS PAIN: 0
EYE PAIN: 0
RECTAL PAIN: 0
TROUBLE SWALLOWING: 0
COUGH: 0
STRIDOR: 0
BACK PAIN: 1
EYE ITCHING: 0
ANAL BLEEDING: 0
EYE REDNESS: 0
COLOR CHANGE: 0
RHINORRHEA: 0
APNEA: 0
EYE DISCHARGE: 0
ABDOMINAL PAIN: 0
CONSTIPATION: 0
SHORTNESS OF BREATH: 0
VOICE CHANGE: 0
VOMITING: 0
GASTROINTESTINAL NEGATIVE: 1
CHOKING: 0
BLOOD IN STOOL: 0
ABDOMINAL DISTENTION: 0
SORE THROAT: 0
RESPIRATORY NEGATIVE: 1
FACIAL SWELLING: 0
NAUSEA: 0
CHEST TIGHTNESS: 0
PHOTOPHOBIA: 0
DIARRHEA: 0
SINUS PRESSURE: 0
EYES NEGATIVE: 1

## 2023-03-16 NOTE — PROGRESS NOTES
[address] [facility]  [address]    [address] [facility]  [address] [place] [address]  Dept: [phone]    Wilmar Almeida is a 80 y.o. female New patient, who presents to the walk-in clinic today with conditions/complaints as noted below:    Chief Complaint   Patient presents with    Fatigue     Sx started 1 week ago     Urinary Frequency     Urinating more than normal for 1 week          HPI:     Urinary Frequency   This is a new problem. The current episode started in the past 7 days. The problem occurs every urination. The problem has been rapidly improving. The patient is experiencing no pain. There has been no fever. She is Not sexually active. There is No history of pyelonephritis. Associated symptoms include frequency. Pertinent negatives include no chills, discharge, flank pain, hematuria, hesitancy, nausea, possible pregnancy, sweats, urgency or vomiting. She has tried nothing for the symptoms. There is no history of catheterization, kidney stones, recurrent UTIs, a single kidney, urinary stasis or a urological procedure. Pt taking miralax- works well for constipation, but she has generalized abdominal aching when taking it. Pt reports she was put on steroids for leg pain and she noted increased urination after finishing. She has h/o diabetes.    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    Wears glasses        Current Outpatient Medications Medication Sig Dispense Refill    NONFORMULARY CBD oil prn      acetaminophen (TYLENOL) 500 MG tablet Take 500 mg by mouth 3 times daily      loratadine (CLARITIN) 10 MG tablet Take 1 tablet by mouth daily 14 tablet 0    fluticasone (FLONASE) 50 MCG/ACT nasal spray 2 sprays by Nasal route daily 1 Bottle 0    nortriptyline (PAMELOR) 25 MG capsule Take 10 mg by mouth daily       ALPRAZolam (XANAX) 0.5 MG tablet Take 0.25 mg by mouth nightly as needed for Sleep. Take 1/2 of 0.5mg tablet      amLODIPine (NORVASC) 10 MG tablet Take 5 mg by mouth daily       hydrochlorothiazide (HYDRODIURIL) 25 MG tablet Take 1 tablet by mouth daily. 30 tablet 0    ondansetron (ZOFRAN ODT) 4 MG disintegrating tablet Take 1 tablet by mouth every 8 hours as needed for Nausea (Patient not taking: Reported on 3/16/2023) 20 tablet 0    potassium chloride (KLOR-CON M) 10 MEQ extended release tablet Take 1 tablet by mouth daily for 15 days 15 tablet 0     No current facility-administered medications for this visit. Allergies   Allergen Reactions    Augmentin [Amoxicillin-Pot Clavulanate] Shortness Of Breath, Photosensitivity and Nausea And Vomiting    Biaxin [Clarithromycin] Shortness Of Breath, Photosensitivity and Nausea And Vomiting    Amoxicillin Rash    Ciprofloxacin Photosensitivity, Nausea And Vomiting and Other (See Comments)     confusion       Review of Systems:     Review of Systems   Constitutional:  Positive for fatigue. Negative for activity change, appetite change, chills, diaphoresis, fever and unexpected weight change. HENT: Negative. Negative for congestion, dental problem, drooling, ear discharge, ear pain, facial swelling, hearing loss, mouth sores, nosebleeds, postnasal drip, rhinorrhea, sinus pressure, sinus pain, sneezing, sore throat, tinnitus, trouble swallowing and voice change. Eyes: Negative. Negative for photophobia, pain, discharge, redness, itching and visual disturbance. Respiratory: Negative. Negative for apnea, cough, choking, chest tightness, shortness of breath, wheezing and stridor. Cardiovascular: Negative. Negative for chest pain, palpitations and leg swelling. Gastrointestinal: Negative. Negative for abdominal distention, abdominal pain, anal bleeding, blood in stool, constipation, diarrhea, nausea, rectal pain and vomiting. Genitourinary:  Positive for frequency. Negative for decreased urine volume, difficulty urinating, dyspareunia, dysuria, enuresis, flank pain, genital sores, hematuria, hesitancy, menstrual problem, pelvic pain, urgency, vaginal bleeding, vaginal discharge and vaginal pain. Musculoskeletal:  Positive for back pain. Negative for arthralgias, gait problem, joint swelling, myalgias, neck pain and neck stiffness. Skin: Negative. Negative for color change, pallor, rash and wound. Neurological: Negative. Negative for dizziness, tremors, seizures, syncope, facial asymmetry, speech difficulty, weakness, light-headedness, numbness and headaches. Physical Exam:      /77   Pulse 95   Temp 98.7 °F (37.1 °C)   Wt 140 lb (63.5 kg)   SpO2 97%   BMI 24.80 kg/m²     Physical Exam  Vitals reviewed. Constitutional:       Appearance: Normal appearance. She is normal weight. HENT:      Head: Normocephalic and atraumatic. Right Ear: Tympanic membrane, ear canal and external ear normal.      Left Ear: Tympanic membrane, ear canal and external ear normal.      Nose: Nose normal.      Mouth/Throat:      Lips: Pink. Mouth: Mucous membranes are moist.      Pharynx: Oropharynx is clear. Uvula midline. Eyes:      Extraocular Movements: Extraocular movements intact. Conjunctiva/sclera: Conjunctivae normal.      Pupils: Pupils are equal, round, and reactive to light. Cardiovascular:      Rate and Rhythm: Normal rate and regular rhythm. Pulses: Normal pulses. Heart sounds: Normal heart sounds.    Pulmonary:      Effort: Pulmonary effort is normal.      Breath sounds: Normal breath sounds and air entry. Abdominal:      General: Abdomen is flat. Bowel sounds are normal.      Palpations: Abdomen is soft. Tenderness: There is no abdominal tenderness. There is no right CVA tenderness, left CVA tenderness, guarding or rebound. Negative signs include Chung's sign, Rovsing's sign, McBurney's sign, psoas sign and obturator sign. Musculoskeletal:         General: Normal range of motion. Cervical back: Normal range of motion and neck supple. Skin:     General: Skin is warm and dry. Capillary Refill: Capillary refill takes less than 2 seconds. Neurological:      General: No focal deficit present. Mental Status: She is alert and oriented to person, place, and time. Mental status is at baseline. Psychiatric:         Mood and Affect: Mood normal.         Behavior: Behavior normal.       Plan:          1. Frequent urination  -     POCT Urinalysis no Micro  -     Culture, Urine; Future     Results for POC orders placed in visit on 03/16/23   POCT Urinalysis no Micro   Result Value Ref Range    Color, UA yellow     Clarity, UA clear     Glucose, UA POC negative     Bilirubin, UA negative     Ketones, UA negative     Spec Grav, UA 1.015     Blood, UA POC negative     pH, UA 6.5     Protein, UA POC 30 mg/dL     Urobilinogen, UA 0.2     Leukocytes, UA negative     Nitrite, UA negative      -Pt well appearing, symptoms have improved significantly. -Recommended stool soften OTC if miralax irritating the abdomen.  -At this time, holding off treatment as ua negative in office.  -Discussed proteinuria which is consistent with CKD  -Urine culture pending, tx plans may change pending results.   -Pt verbalized understanding    Follow Up Instructions:      Return if symptoms worsen or fail to improve, for Follow up with PCP within 2 weeks. No orders of the defined types were placed in this encounter.             Patient and/or parent given educational materials - see patient instructions. Discussed use, benefit, and side effects of prescribed medications. All patient questions answered. Patient and/or parent voiced understanding.       Electronically signed by GUERRERO Allen 3/16/2023 at 2:22 PM

## 2023-03-17 DIAGNOSIS — R35.0 FREQUENT URINATION: ICD-10-CM

## 2023-03-18 LAB
MICROORGANISM SPEC CULT: NORMAL
SPECIMEN DESCRIPTION: NORMAL

## 2023-12-31 ENCOUNTER — APPOINTMENT (OUTPATIENT)
Dept: GENERAL RADIOLOGY | Age: 88
End: 2023-12-31
Payer: MEDICARE

## 2023-12-31 ENCOUNTER — HOSPITAL ENCOUNTER (EMERGENCY)
Age: 88
Discharge: HOME | End: 2023-12-31
Attending: EMERGENCY MEDICINE
Payer: MEDICARE

## 2023-12-31 VITALS
DIASTOLIC BLOOD PRESSURE: 67 MMHG | HEIGHT: 63 IN | TEMPERATURE: 98.1 F | HEART RATE: 99 BPM | RESPIRATION RATE: 18 BRPM | WEIGHT: 140 LBS | SYSTOLIC BLOOD PRESSURE: 128 MMHG | OXYGEN SATURATION: 95 % | BODY MASS INDEX: 24.8 KG/M2

## 2023-12-31 DIAGNOSIS — U07.1 COVID-19: Primary | ICD-10-CM

## 2023-12-31 LAB
ANION GAP SERPL CALCULATED.3IONS-SCNC: 9 MMOL/L (ref 9–17)
BASOPHILS # BLD: 0.07 K/UL (ref 0–0.2)
BASOPHILS NFR BLD: 1 % (ref 0–2)
BUN SERPL-MCNC: 28 MG/DL (ref 8–23)
BUN/CREAT SERPL: 20 (ref 9–20)
CALCIUM SERPL-MCNC: 9.2 MG/DL (ref 8.6–10.4)
CHLORIDE SERPL-SCNC: 94 MMOL/L (ref 98–107)
CO2 SERPL-SCNC: 28 MMOL/L (ref 20–31)
CREAT SERPL-MCNC: 1.4 MG/DL (ref 0.5–0.9)
EOSINOPHIL # BLD: 0.03 K/UL (ref 0–0.44)
EOSINOPHILS RELATIVE PERCENT: 0 % (ref 1–4)
ERYTHROCYTE [DISTWIDTH] IN BLOOD BY AUTOMATED COUNT: 13.5 % (ref 11.8–14.4)
FLUAV RNA RESP QL NAA+PROBE: NOT DETECTED
FLUBV RNA RESP QL NAA+PROBE: NOT DETECTED
GFR SERPL CREATININE-BSD FRML MDRD: 36 ML/MIN/1.73M2
GLUCOSE SERPL-MCNC: 121 MG/DL (ref 70–99)
HCT VFR BLD AUTO: 32.7 % (ref 36.3–47.1)
HGB BLD-MCNC: 10.7 G/DL (ref 11.9–15.1)
IMM GRANULOCYTES # BLD AUTO: 0.06 K/UL (ref 0–0.3)
IMM GRANULOCYTES NFR BLD: 1 %
LYMPHOCYTES NFR BLD: 0.98 K/UL (ref 1.1–3.7)
LYMPHOCYTES RELATIVE PERCENT: 13 % (ref 24–43)
MCH RBC QN AUTO: 30.1 PG (ref 25.2–33.5)
MCHC RBC AUTO-ENTMCNC: 32.7 G/DL (ref 28.4–34.8)
MCV RBC AUTO: 92.1 FL (ref 82.6–102.9)
MONOCYTES NFR BLD: 0.98 K/UL (ref 0.1–1.2)
MONOCYTES NFR BLD: 13 % (ref 3–12)
MYOGLOBIN SERPL-MCNC: 181 NG/ML (ref 25–58)
MYOGLOBIN SERPL-MCNC: 204 NG/ML (ref 25–58)
NEUTROPHILS NFR BLD: 72 % (ref 36–65)
NEUTS SEG NFR BLD: 5.28 K/UL (ref 1.5–8.1)
NRBC BLD-RTO: 0 PER 100 WBC
PLATELET # BLD AUTO: 190 K/UL (ref 138–453)
PMV BLD AUTO: 9.8 FL (ref 8.1–13.5)
POTASSIUM SERPL-SCNC: 3.9 MMOL/L (ref 3.7–5.3)
RBC # BLD AUTO: 3.55 M/UL (ref 3.95–5.11)
SARS-COV-2 RNA RESP QL NAA+PROBE: DETECTED
SODIUM SERPL-SCNC: 131 MMOL/L (ref 135–144)
SOURCE: ABNORMAL
SPECIMEN DESCRIPTION: ABNORMAL
SPECIMEN SOURCE: NORMAL
STREP A, MOLECULAR: NEGATIVE
TROPONIN I SERPL HS-MCNC: 21 NG/L (ref 0–14)
TROPONIN I SERPL HS-MCNC: 31 NG/L (ref 0–14)
WBC OTHER # BLD: 7.4 K/UL (ref 3.5–11.3)

## 2023-12-31 PROCEDURE — 99285 EMERGENCY DEPT VISIT HI MDM: CPT

## 2023-12-31 PROCEDURE — 83874 ASSAY OF MYOGLOBIN: CPT

## 2023-12-31 PROCEDURE — 96361 HYDRATE IV INFUSION ADD-ON: CPT

## 2023-12-31 PROCEDURE — 96374 THER/PROPH/DIAG INJ IV PUSH: CPT

## 2023-12-31 PROCEDURE — 6360000002 HC RX W HCPCS: Performed by: PHYSICIAN ASSISTANT

## 2023-12-31 PROCEDURE — 87636 SARSCOV2 & INF A&B AMP PRB: CPT

## 2023-12-31 PROCEDURE — 2580000003 HC RX 258: Performed by: PHYSICIAN ASSISTANT

## 2023-12-31 PROCEDURE — 84484 ASSAY OF TROPONIN QUANT: CPT

## 2023-12-31 PROCEDURE — 71045 X-RAY EXAM CHEST 1 VIEW: CPT

## 2023-12-31 PROCEDURE — 85025 COMPLETE CBC W/AUTO DIFF WBC: CPT

## 2023-12-31 PROCEDURE — 94640 AIRWAY INHALATION TREATMENT: CPT

## 2023-12-31 PROCEDURE — 93005 ELECTROCARDIOGRAM TRACING: CPT | Performed by: EMERGENCY MEDICINE

## 2023-12-31 PROCEDURE — 80048 BASIC METABOLIC PNL TOTAL CA: CPT

## 2023-12-31 PROCEDURE — 87651 STREP A DNA AMP PROBE: CPT

## 2023-12-31 RX ORDER — PREDNISONE 20 MG/1
20 TABLET ORAL 2 TIMES DAILY
Qty: 10 TABLET | Refills: 0 | Status: SHIPPED | OUTPATIENT
Start: 2023-12-31 | End: 2023-12-31

## 2023-12-31 RX ORDER — ALBUTEROL SULFATE 90 UG/1
2 AEROSOL, METERED RESPIRATORY (INHALATION) EVERY 4 HOURS PRN
Qty: 8.5 G | Refills: 0 | Status: SHIPPED | OUTPATIENT
Start: 2023-12-31 | End: 2023-12-31

## 2023-12-31 RX ORDER — BENZONATATE 100 MG/1
100 CAPSULE ORAL 3 TIMES DAILY PRN
Qty: 30 CAPSULE | Refills: 0 | Status: SHIPPED | OUTPATIENT
Start: 2023-12-31 | End: 2023-12-31

## 2023-12-31 RX ORDER — PREDNISONE 20 MG/1
20 TABLET ORAL 2 TIMES DAILY
Qty: 10 TABLET | Refills: 0 | Status: SHIPPED | OUTPATIENT
Start: 2023-12-31 | End: 2024-01-05

## 2023-12-31 RX ORDER — ALBUTEROL SULFATE 2.5 MG/3ML
2.5 SOLUTION RESPIRATORY (INHALATION) ONCE
Status: COMPLETED | OUTPATIENT
Start: 2023-12-31 | End: 2023-12-31

## 2023-12-31 RX ORDER — 0.9 % SODIUM CHLORIDE 0.9 %
1000 INTRAVENOUS SOLUTION INTRAVENOUS ONCE
Status: COMPLETED | OUTPATIENT
Start: 2023-12-31 | End: 2023-12-31

## 2023-12-31 RX ORDER — METHYLPREDNISOLONE SODIUM SUCCINATE 125 MG/2ML
125 INJECTION, POWDER, LYOPHILIZED, FOR SOLUTION INTRAMUSCULAR; INTRAVENOUS ONCE
Status: COMPLETED | OUTPATIENT
Start: 2023-12-31 | End: 2023-12-31

## 2023-12-31 RX ORDER — ALBUTEROL SULFATE 90 UG/1
2 AEROSOL, METERED RESPIRATORY (INHALATION) EVERY 4 HOURS PRN
Qty: 8.5 G | Refills: 0 | Status: SHIPPED | OUTPATIENT
Start: 2023-12-31

## 2023-12-31 RX ORDER — BENZONATATE 100 MG/1
100 CAPSULE ORAL 3 TIMES DAILY PRN
Qty: 30 CAPSULE | Refills: 0 | Status: SHIPPED | OUTPATIENT
Start: 2023-12-31 | End: 2024-01-07

## 2023-12-31 RX ADMIN — ALBUTEROL SULFATE 2.5 MG: 2.5 SOLUTION RESPIRATORY (INHALATION) at 14:40

## 2023-12-31 RX ADMIN — SODIUM CHLORIDE 1000 ML: 9 INJECTION, SOLUTION INTRAVENOUS at 13:15

## 2023-12-31 RX ADMIN — METHYLPREDNISOLONE SODIUM SUCCINATE 125 MG: 125 INJECTION, POWDER, LYOPHILIZED, FOR SOLUTION INTRAMUSCULAR; INTRAVENOUS at 16:59

## 2023-12-31 NOTE — DISCHARGE INSTRUCTIONS
Take meds as prescribed.  Follow up with doctor  in 3 -4 days.  Return to ER immediately if symptoms worsen or persist.

## 2024-01-02 LAB
EKG ATRIAL RATE: 87 BPM
EKG P AXIS: 72 DEGREES
EKG P-R INTERVAL: 148 MS
EKG Q-T INTERVAL: 370 MS
EKG QRS DURATION: 94 MS
EKG QTC CALCULATION (BAZETT): 445 MS
EKG R AXIS: 63 DEGREES
EKG T AXIS: 35 DEGREES
EKG VENTRICULAR RATE: 87 BPM

## 2024-01-05 NOTE — ED PROVIDER NOTES
eMERGENCY dEPARTMENT eNCOUnter   Independent Attestation     Pt Name: Cecilia Ellis  MRN: [id number]  Birthdate 4/22/1934  Date of evaluation: 1/5/24     Cecilia Ellis is a 89 y.o. female with CC: Shortness of Breath (PT states she tested positive for COVID at home yesterday and has been having SOB and fatigue.), Concern For COVID-19, Fatigue, and Foot Pain (Lt foot has concern for infection.)        This visit was performed by both a physician and an APC. I performed all aspects of the MDM as documented.      Jean Paul Boykin MD  Attending Emergency Physician            Jean Paul Boykin MD  01/05/24 6553    
Vitals [12/31/23 1220]   BP Temp Temp Source Pulse Respirations SpO2 Height Weight - Scale   128/67 98.1 °F (36.7 °C) Oral 99 18 95 % 1.6 m (5' 3\") 63.5 kg (140 lb)     Physical Exam  Constitutional:       Appearance: She is well-developed.   HENT:      Head: Normocephalic and atraumatic.   Cardiovascular:      Rate and Rhythm: Normal rate and regular rhythm.   Pulmonary:      Effort: Pulmonary effort is normal.      Breath sounds: Normal breath sounds. No decreased breath sounds. Wheezes: Good airway movement with a very slight wheeze..  Abdominal:      Palpations: Abdomen is soft.   Musculoskeletal:         General: Normal range of motion.      Cervical back: Normal range of motion and neck supple.   Skin:     General: Skin is warm.      Findings: No rash.   Neurological:      Mental Status: She is alert and oriented to person, place, and time.   Psychiatric:         Behavior: Behavior normal.                 DIAGNOSTIC RESULTS     EKG: All EKG's are interpreted by the Emergency Department Physician who either signs or Co-signs this chart in the absence of a cardiologist.        RADIOLOGY:   Non-plain film images such as CT, Ultrasound and MRI are read by the radiologist. Plain radiographic images arevisualized and preliminarily interpreted by the emergency physician with the below findings:        Interpretation per the Radiologist below, if available at thetime of this note:          ED BEDSIDE ULTRASOUND:   Performed by ED Physician - none    LABS:  Labs Reviewed   COVID-19 & INFLUENZA COMBO - Abnormal; Notable for the following components:       Result Value    SARS-CoV-2 RNA, RT PCR DETECTED (*)     All other components within normal limits   CBC WITH AUTO DIFFERENTIAL - Abnormal; Notable for the following components:    RBC 3.55 (*)     Hemoglobin 10.7 (*)     Hematocrit 32.7 (*)     Neutrophils % 72 (*)     Lymphocytes % 13 (*)     Monocytes % 13 (*)     Eosinophils % 0 (*)     Immature Granulocytes 1 (*)

## 2024-04-10 ENCOUNTER — HOSPITAL ENCOUNTER (EMERGENCY)
Age: 89
Discharge: HOME | End: 2024-04-10
Attending: EMERGENCY MEDICINE
Payer: MEDICARE

## 2024-04-10 VITALS
BODY MASS INDEX: 23.92 KG/M2 | HEIGHT: 63 IN | WEIGHT: 135 LBS | RESPIRATION RATE: 16 BRPM | DIASTOLIC BLOOD PRESSURE: 73 MMHG | OXYGEN SATURATION: 97 % | TEMPERATURE: 98.1 F | SYSTOLIC BLOOD PRESSURE: 163 MMHG | HEART RATE: 104 BPM

## 2024-04-10 DIAGNOSIS — S40.022A CONTUSION OF LEFT UPPER EXTREMITY, INITIAL ENCOUNTER: Primary | ICD-10-CM

## 2024-04-10 PROCEDURE — 99282 EMERGENCY DEPT VISIT SF MDM: CPT

## 2024-04-10 ASSESSMENT — PAIN - FUNCTIONAL ASSESSMENT: PAIN_FUNCTIONAL_ASSESSMENT: NONE - DENIES PAIN

## 2024-04-10 NOTE — ED PROVIDER NOTES
atraumatic.      Nose: Nose normal.      Mouth/Throat:      Mouth: Mucous membranes are moist.   Eyes:      Extraocular Movements: Extraocular movements intact.   Pulmonary:      Effort: Pulmonary effort is normal. No respiratory distress.   Musculoskeletal:         General: No signs of injury.      Cervical back: Normal range of motion and neck supple.   Skin:     General: Skin is warm and dry.      Findings: Bruising (left upper arm) present.   Neurological:      Mental Status: She is alert and oriented to person, place, and time.   Psychiatric:         Mood and Affect: Mood normal.         Behavior: Behavior normal.         DIAGNOSTIC RESULTS     RADIOLOGY:   Non-plain film images such as CT, Ultrasound and MRI are read by theradiologist. Plain radiographic images are visualized and preliminarily interpreted by the emergency physician with the below findings:    None indicated    ED BEDSIDE ULTRASOUND:   A bedside ultrasound of the deep veins of the left upper extremity was performed by me.  The patient was placed in the seated position with the arm raised above her head.  Using the linear probe, the deep veins of the left upper arm were visualized just proximal to the antecubital fossa.  The veins were followed up to the axilla with compression performed at multiple sites with visualization of the veins compressible at each site.    LABS:  Labs Reviewed - No data to display    All other labs were within normal range or not returned as of this dictation.    EMERGENCYDEPARTMENT COURSE and DIFFERENTIAL DIAGNOSIS/MDM:   Vitals:    Vitals:    04/10/24 1443   BP: (!) 163/73   Pulse: (!) 104   Resp: 16   Temp: 98.1 °F (36.7 °C)   SpO2: 97%   Weight: 61.2 kg (135 lb)   Height: 1.6 m (5' 3\")     89-year-old female presenting with bruising to the left upper arm which she is concerned could be due to blood clots.  She does not have any swelling to suggest blood clot.  No risk factors for blood clots.  Will perform a

## 2024-05-15 ENCOUNTER — OFFICE VISIT (OUTPATIENT)
Dept: PODIATRY | Age: 89
End: 2024-05-15
Payer: MEDICARE

## 2024-05-15 VITALS — HEIGHT: 63 IN | WEIGHT: 138 LBS | BODY MASS INDEX: 24.45 KG/M2

## 2024-05-15 DIAGNOSIS — M79.604 PAIN IN BOTH LOWER EXTREMITIES: ICD-10-CM

## 2024-05-15 DIAGNOSIS — M79.605 PAIN IN BOTH LOWER EXTREMITIES: ICD-10-CM

## 2024-05-15 DIAGNOSIS — M20.42 ACQUIRED HAMMER TOE OF LEFT FOOT: Primary | ICD-10-CM

## 2024-05-15 PROCEDURE — 99204 OFFICE O/P NEW MOD 45 MIN: CPT | Performed by: PODIATRIST

## 2024-05-15 PROCEDURE — 1036F TOBACCO NON-USER: CPT | Performed by: PODIATRIST

## 2024-05-15 PROCEDURE — 1123F ACP DISCUSS/DSCN MKR DOCD: CPT | Performed by: PODIATRIST

## 2024-05-15 PROCEDURE — 1090F PRES/ABSN URINE INCON ASSESS: CPT | Performed by: PODIATRIST

## 2024-05-15 PROCEDURE — G8427 DOCREV CUR MEDS BY ELIG CLIN: HCPCS | Performed by: PODIATRIST

## 2024-05-15 PROCEDURE — G8420 CALC BMI NORM PARAMETERS: HCPCS | Performed by: PODIATRIST

## 2024-05-15 NOTE — PROGRESS NOTES
[facility] PODIATRY [address]  [address]  [place] [id number]  Dept: [phone]  Dept Fax: [phone]    NEW PATIENT PROGRESS NOTE  Date of patient's visit: 5/15/2024  Patient's Name:  Cecilia Ellis YOB: 1934            Patient Care Team:  Armani Sarah MD as PCP - General  Patria Morales DPM as Physician (Podiatry)        Chief Complaint   Patient presents with    New Patient     Establish care     Toe Pain     Had pedicure  x 3 weeks ago then started have pain and redness with burning on the left 4th toe     Diabetes     Diabetic   A1c 6.3         HPI:   Cecilia Ellis is a 89 y.o. female who presents to the office today complaining of toe pain- 4th toe on the left, to become est and diabetic.  Symptoms began 3 +week(s) ago. Patient relates pain is Present.  Pain is rated 5 out of 10 and is described as constant, mild, moderate.  Treatments prior to today's visit include: none.  Currently denies F/C/N/V. Pt's primary care physician is Armani Sarah MD last seen 4/3/24.     Allergies   Allergen Reactions    Augmentin [Amoxicillin-Pot Clavulanate] Shortness Of Breath, Photosensitivity and Nausea And Vomiting    Biaxin [Clarithromycin] Shortness Of Breath, Photosensitivity and Nausea And Vomiting    Amoxicillin Rash    Ciprofloxacin Photosensitivity, Nausea And Vomiting and Other (See Comments)     confusion       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    Wears glasses        Prior to Admission medications    Medication Sig Start Date

## 2024-06-03 ENCOUNTER — OFFICE VISIT (OUTPATIENT)
Dept: PRIMARY CARE CLINIC | Age: 89
End: 2024-06-03
Payer: MEDICARE

## 2024-06-03 VITALS
DIASTOLIC BLOOD PRESSURE: 71 MMHG | HEART RATE: 83 BPM | SYSTOLIC BLOOD PRESSURE: 147 MMHG | TEMPERATURE: 97 F | OXYGEN SATURATION: 95 %

## 2024-06-03 DIAGNOSIS — B96.89 ACUTE BACTERIAL SINUSITIS: Primary | ICD-10-CM

## 2024-06-03 DIAGNOSIS — R42 DIZZINESS: ICD-10-CM

## 2024-06-03 DIAGNOSIS — J01.90 ACUTE BACTERIAL SINUSITIS: Primary | ICD-10-CM

## 2024-06-03 PROCEDURE — G8427 DOCREV CUR MEDS BY ELIG CLIN: HCPCS | Performed by: NURSE PRACTITIONER

## 2024-06-03 PROCEDURE — 1090F PRES/ABSN URINE INCON ASSESS: CPT | Performed by: NURSE PRACTITIONER

## 2024-06-03 PROCEDURE — 99213 OFFICE O/P EST LOW 20 MIN: CPT | Performed by: NURSE PRACTITIONER

## 2024-06-03 PROCEDURE — 1036F TOBACCO NON-USER: CPT | Performed by: NURSE PRACTITIONER

## 2024-06-03 PROCEDURE — G8420 CALC BMI NORM PARAMETERS: HCPCS | Performed by: NURSE PRACTITIONER

## 2024-06-03 PROCEDURE — 1123F ACP DISCUSS/DSCN MKR DOCD: CPT | Performed by: NURSE PRACTITIONER

## 2024-06-03 RX ORDER — DOXYCYCLINE HYCLATE 100 MG/1
100 CAPSULE ORAL 2 TIMES DAILY
Qty: 14 CAPSULE | Refills: 0 | Status: SHIPPED | OUTPATIENT
Start: 2024-06-03 | End: 2024-06-10

## 2024-06-03 RX ORDER — MECLIZINE HYDROCHLORIDE 25 MG/1
25 TABLET ORAL 3 TIMES DAILY PRN
Qty: 15 TABLET | Refills: 0 | Status: SHIPPED | OUTPATIENT
Start: 2024-06-03 | End: 2024-06-13

## 2024-06-03 ASSESSMENT — ENCOUNTER SYMPTOMS
SINUS PRESSURE: 1
COUGH: 1
CHEST TIGHTNESS: 0
SHORTNESS OF BREATH: 0
WHEEZING: 0
EYE DISCHARGE: 0
EYE REDNESS: 0
SORE THROAT: 0
VOICE CHANGE: 0
RHINORRHEA: 1

## 2024-06-03 NOTE — PROGRESS NOTES
[facility] PHYSICIANS [facility],  WALK IN [facility]  [address]  [facility] [address]  Dept: [phone]  Dept Fax: [phone]     Cecilia Ellis is a 89 y.o. female who presents to the urgent care today for her medicalconditions/complaints as noted below.  Cecilia Ellis is c/o of Ear Fullness (Sinus pressure/fullness, pt states when she bends her hear forward she gets dizzy )    HPI:      Ear Fullness   There is pain in both ears. This is a new problem. The current episode started in the past 7 days. The problem has been gradually worsening. There has been no fever. Associated symptoms include coughing, hearing loss and rhinorrhea. Pertinent negatives include no ear discharge, headaches, rash or sore throat. Treatments tried: otc tx. The treatment provided no relief.     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    Wears glasses       Current Outpatient Medications   Medication Sig Dispense Refill    doxycycline hyclate (VIBRAMYCIN) 100 MG capsule Take 1 capsule by mouth 2 times daily for 7 days 14 capsule 0    meclizine (ANTIVERT) 25 MG tablet Take 1 tablet by mouth 3 times daily as needed for Dizziness or Nausea 15 tablet 0    vitamin D3 (CHOLECALCIFEROL) 125 MCG (5000 UT) TABS tablet Take 1 tablet by mouth daily      albuterol sulfate HFA (PROAIR HFA) 108 (90 Base) MCG/ACT inhaler Inhale 2 puffs into the lungs every 4 hours as needed for Shortness of Breath or Wheezing 8.5 g 0    NONFORMULARY CBD oil prn      acetaminophen (TYLENOL) 500 MG tablet Take 1 tablet by mouth 3 times

## 2024-07-01 ENCOUNTER — OFFICE VISIT (OUTPATIENT)
Dept: PRIMARY CARE CLINIC | Age: 89
End: 2024-07-01
Payer: MEDICARE

## 2024-07-01 VITALS
DIASTOLIC BLOOD PRESSURE: 68 MMHG | OXYGEN SATURATION: 99 % | SYSTOLIC BLOOD PRESSURE: 130 MMHG | HEART RATE: 86 BPM | TEMPERATURE: 97.5 F

## 2024-07-01 DIAGNOSIS — B96.89 ACUTE BACTERIAL SINUSITIS: Primary | ICD-10-CM

## 2024-07-01 DIAGNOSIS — J01.90 ACUTE BACTERIAL SINUSITIS: Primary | ICD-10-CM

## 2024-07-01 DIAGNOSIS — R53.83 OTHER FATIGUE: ICD-10-CM

## 2024-07-01 PROCEDURE — G8420 CALC BMI NORM PARAMETERS: HCPCS | Performed by: NURSE PRACTITIONER

## 2024-07-01 PROCEDURE — 99213 OFFICE O/P EST LOW 20 MIN: CPT | Performed by: NURSE PRACTITIONER

## 2024-07-01 PROCEDURE — G8427 DOCREV CUR MEDS BY ELIG CLIN: HCPCS | Performed by: NURSE PRACTITIONER

## 2024-07-01 PROCEDURE — 1036F TOBACCO NON-USER: CPT | Performed by: NURSE PRACTITIONER

## 2024-07-01 PROCEDURE — 1123F ACP DISCUSS/DSCN MKR DOCD: CPT | Performed by: NURSE PRACTITIONER

## 2024-07-01 PROCEDURE — 1090F PRES/ABSN URINE INCON ASSESS: CPT | Performed by: NURSE PRACTITIONER

## 2024-07-01 RX ORDER — AZELASTINE 1 MG/ML
2 SPRAY, METERED NASAL 2 TIMES DAILY
Qty: 30 ML | Refills: 0 | Status: SHIPPED | OUTPATIENT
Start: 2024-07-01

## 2024-07-01 RX ORDER — UBIDECARENONE 75 MG
50 CAPSULE ORAL DAILY
COMMUNITY

## 2024-07-01 RX ORDER — CEFDINIR 300 MG/1
300 CAPSULE ORAL 2 TIMES DAILY
Qty: 20 CAPSULE | Refills: 0 | Status: SHIPPED | OUTPATIENT
Start: 2024-07-01 | End: 2024-07-11

## 2024-07-01 ASSESSMENT — ENCOUNTER SYMPTOMS
SORE THROAT: 0
SINUS PRESSURE: 1
EYE REDNESS: 0
WHEEZING: 0
EYE DISCHARGE: 0
CHEST TIGHTNESS: 0
SHORTNESS OF BREATH: 0
COUGH: 1
HOARSE VOICE: 0
VOICE CHANGE: 0

## 2024-07-01 NOTE — PROGRESS NOTES
[facility] PHYSICIANS [facility], [facility] IN [facility]  [address]  [facility] [address]  Dept: [phone]  Dept Fax: [phone]     Cecilia Ellis is a 89 y.o. female who presents to the urgent care today for her medicalconditions/complaints as noted below.  Cecilia Ellis is c/o of Sinusitis (Sinus congestion, ear fullness- has not resolved since being seen 6/3)    HPI:      Sinusitis  This is a new problem. The current episode started more than 1 month ago. The problem is unchanged. There has been no fever. Associated symptoms include congestion, coughing, ear pain (fullness) and sinus pressure. Pertinent negatives include no chills, headaches, hoarse voice, shortness of breath, sneezing or sore throat. Treatments tried: doxycycline, flonase. The treatment provided mild relief.       6/3 was treated for sinus infection with doxycyline.    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    Wears glasses            Current Outpatient Medications   Medication Sig Dispense Refill    vitamin B-12 (CYANOCOBALAMIN) 100 MCG tablet Take 0.5 tablets by mouth daily      cefdinir (OMNICEF) 300 MG capsule Take 1 capsule by mouth 2 times daily for 10 days 20 capsule 0    azelastine (ASTELIN) 0.1 % nasal spray 2 sprays by Nasal route 2 times daily Use in each nostril as directed 30 mL 0    vitamin D3 (CHOLECALCIFEROL) 125 MCG (5000 UT) TABS tablet Take 1 tablet by mouth daily      NONFORMULARY CBD oil prn      loratadine (CLARITIN) 10 MG tablet Take 1 tablet by mouth daily 14 tablet 0    fluticasone

## 2024-07-05 DIAGNOSIS — R53.83 OTHER FATIGUE: ICD-10-CM

## 2024-07-05 LAB
ALBUMIN: 4.1 G/DL
ALP BLD-CCNC: 64 U/L
ALT SERPL-CCNC: 12 U/L
ANION GAP SERPL CALCULATED.3IONS-SCNC: 9 MMOL/L
AST SERPL-CCNC: 36 U/L
BASOPHILS ABSOLUTE: 0.1 /ΜL
BASOPHILS RELATIVE PERCENT: 1.6 %
BILIRUB SERPL-MCNC: 0.4 MG/DL (ref 0.1–1.4)
BUN BLDV-MCNC: 26 MG/DL
CALCIUM SERPL-MCNC: 9.7 MG/DL
CHLORIDE BLD-SCNC: 100 MMOL/L
CO2: 30 MMOL/L
CREAT SERPL-MCNC: 1.32 MG/DL
EOSINOPHILS ABSOLUTE: 0.1 /ΜL
EOSINOPHILS RELATIVE PERCENT: 1 %
GFR, ESTIMATED: 38
GLUCOSE BLD-MCNC: 99 MG/DL
HCT VFR BLD CALC: 35.6 % (ref 36–46)
HEMOGLOBIN: 12.1 G/DL (ref 12–16)
LYMPHOCYTES ABSOLUTE: 1.8 /ΜL
LYMPHOCYTES RELATIVE PERCENT: 30.6 %
MCH RBC QN AUTO: 30.1 PG
MCHC RBC AUTO-ENTMCNC: 34 G/DL
MCV RBC AUTO: 89 FL
MONOCYTES ABSOLUTE: 0.4 /ΜL
MONOCYTES RELATIVE PERCENT: 6.5 %
NEUTROPHILS ABSOLUTE: 3.5 /ΜL
NEUTROPHILS RELATIVE PERCENT: 60.3 %
PDW BLD-RTO: ABNORMAL %
PLATELET # BLD: 223 K/ΜL
PMV BLD AUTO: 8.4 FL
POTASSIUM SERPL-SCNC: 4.1 MMOL/L
RBC # BLD: 4.02 10^6/ΜL
SODIUM BLD-SCNC: 139 MMOL/L
TOTAL PROTEIN: 7.1 G/DL (ref 6.4–8.2)
TSH SERPL DL<=0.05 MIU/L-ACNC: 1.79 UIU/ML
WBC # BLD: 5.8 10^3/ML

## 2024-07-23 ENCOUNTER — OFFICE VISIT (OUTPATIENT)
Dept: PRIMARY CARE CLINIC | Age: 89
End: 2024-07-23
Payer: MEDICARE

## 2024-07-23 VITALS
SYSTOLIC BLOOD PRESSURE: 164 MMHG | OXYGEN SATURATION: 98 % | DIASTOLIC BLOOD PRESSURE: 74 MMHG | HEART RATE: 92 BPM | TEMPERATURE: 96.8 F

## 2024-07-23 DIAGNOSIS — H65.92 LEFT OTITIS MEDIA WITH EFFUSION: Primary | ICD-10-CM

## 2024-07-23 PROCEDURE — G8420 CALC BMI NORM PARAMETERS: HCPCS | Performed by: NURSE PRACTITIONER

## 2024-07-23 PROCEDURE — 1123F ACP DISCUSS/DSCN MKR DOCD: CPT | Performed by: NURSE PRACTITIONER

## 2024-07-23 PROCEDURE — 99213 OFFICE O/P EST LOW 20 MIN: CPT | Performed by: NURSE PRACTITIONER

## 2024-07-23 PROCEDURE — 1090F PRES/ABSN URINE INCON ASSESS: CPT | Performed by: NURSE PRACTITIONER

## 2024-07-23 PROCEDURE — G8427 DOCREV CUR MEDS BY ELIG CLIN: HCPCS | Performed by: NURSE PRACTITIONER

## 2024-07-23 PROCEDURE — 1036F TOBACCO NON-USER: CPT | Performed by: NURSE PRACTITIONER

## 2024-07-23 RX ORDER — AZELASTINE 1 MG/ML
2 SPRAY, METERED NASAL 2 TIMES DAILY
Qty: 30 ML | Refills: 0 | Status: SHIPPED | OUTPATIENT
Start: 2024-07-23

## 2024-07-23 RX ORDER — FLUTICASONE PROPIONATE 50 MCG
2 SPRAY, SUSPENSION (ML) NASAL DAILY
Qty: 32 G | Refills: 0 | Status: SHIPPED | OUTPATIENT
Start: 2024-07-23

## 2024-07-23 ASSESSMENT — ENCOUNTER SYMPTOMS
EYE REDNESS: 0
WHEEZING: 0
SINUS PRESSURE: 0
COUGH: 0
VOICE CHANGE: 0
CHEST TIGHTNESS: 0
SORE THROAT: 0
EYE DISCHARGE: 0
SHORTNESS OF BREATH: 0

## 2024-07-23 NOTE — PROGRESS NOTES
[facility] PHYSICIANS [facility], [facility] WALK IN [facility]  [address]  [facility] [address]  Dept: [phone]  Dept Fax: [phone]     Cecilia Ellis is a 89 y.o. female who presents to the urgent care today for her medicalconditions/complaints as noted below.  Cecilia Ellis is c/o of Dizziness (X the other day)    HPI:      Dizziness  This is a recurrent problem. The current episode started yesterday. Associated symptoms include neck pain and vertigo. Pertinent negatives include no chest pain, chills, congestion, coughing, fatigue, fever, headaches, myalgias, rash, sore throat or weakness. The symptoms are aggravated by drinking, eating and swallowing. Treatments tried: doxy, flonase/astelin, omnicef. The treatment provided mild relief.     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    Wears glasses            Current Outpatient Medications   Medication Sig Dispense Refill    fluticasone (FLONASE) 50 MCG/ACT nasal spray 2 sprays by Each Nostril route daily 32 g 0    azelastine (ASTELIN) 0.1 % nasal spray 2 sprays by Nasal route 2 times daily Use in each nostril as directed 30 mL 0    vitamin B-12 (CYANOCOBALAMIN) 100 MCG tablet Take 0.5 tablets by mouth daily      azelastine (ASTELIN) 0.1 % nasal spray 2 sprays by Nasal route 2 times daily Use in each nostril as directed 30 mL 0    vitamin D3 (CHOLECALCIFEROL) 125 MCG (5000 UT) TABS tablet Take 1 tablet by mouth daily      albuterol

## 2024-10-11 ENCOUNTER — OFFICE VISIT (OUTPATIENT)
Dept: PRIMARY CARE CLINIC | Age: 89
End: 2024-10-11
Payer: MEDICARE

## 2024-10-11 VITALS
WEIGHT: 138 LBS | TEMPERATURE: 97.6 F | HEART RATE: 95 BPM | BODY MASS INDEX: 24.45 KG/M2 | OXYGEN SATURATION: 99 % | SYSTOLIC BLOOD PRESSURE: 147 MMHG | DIASTOLIC BLOOD PRESSURE: 75 MMHG

## 2024-10-11 DIAGNOSIS — J01.90 ACUTE BACTERIAL SINUSITIS: Primary | ICD-10-CM

## 2024-10-11 DIAGNOSIS — B96.89 ACUTE BACTERIAL SINUSITIS: Primary | ICD-10-CM

## 2024-10-11 PROCEDURE — 1090F PRES/ABSN URINE INCON ASSESS: CPT | Performed by: NURSE PRACTITIONER

## 2024-10-11 PROCEDURE — G8484 FLU IMMUNIZE NO ADMIN: HCPCS | Performed by: NURSE PRACTITIONER

## 2024-10-11 PROCEDURE — G8420 CALC BMI NORM PARAMETERS: HCPCS | Performed by: NURSE PRACTITIONER

## 2024-10-11 PROCEDURE — G8427 DOCREV CUR MEDS BY ELIG CLIN: HCPCS | Performed by: NURSE PRACTITIONER

## 2024-10-11 PROCEDURE — 1123F ACP DISCUSS/DSCN MKR DOCD: CPT | Performed by: NURSE PRACTITIONER

## 2024-10-11 PROCEDURE — 1036F TOBACCO NON-USER: CPT | Performed by: NURSE PRACTITIONER

## 2024-10-11 PROCEDURE — 99213 OFFICE O/P EST LOW 20 MIN: CPT | Performed by: NURSE PRACTITIONER

## 2024-10-11 RX ORDER — CEFDINIR 300 MG/1
300 CAPSULE ORAL 2 TIMES DAILY
Qty: 14 CAPSULE | Refills: 0 | Status: SHIPPED | OUTPATIENT
Start: 2024-10-11 | End: 2024-10-21

## 2024-10-11 RX ORDER — AZELASTINE 1 MG/ML
2 SPRAY, METERED NASAL 2 TIMES DAILY
Qty: 30 ML | Refills: 0 | Status: SHIPPED | OUTPATIENT
Start: 2024-10-11

## 2024-10-11 SDOH — ECONOMIC STABILITY: FOOD INSECURITY: WITHIN THE PAST 12 MONTHS, YOU WORRIED THAT YOUR FOOD WOULD RUN OUT BEFORE YOU GOT MONEY TO BUY MORE.: NEVER TRUE

## 2024-10-11 SDOH — ECONOMIC STABILITY: FOOD INSECURITY: WITHIN THE PAST 12 MONTHS, THE FOOD YOU BOUGHT JUST DIDN'T LAST AND YOU DIDN'T HAVE MONEY TO GET MORE.: NEVER TRUE

## 2024-10-11 SDOH — ECONOMIC STABILITY: INCOME INSECURITY: HOW HARD IS IT FOR YOU TO PAY FOR THE VERY BASICS LIKE FOOD, HOUSING, MEDICAL CARE, AND HEATING?: NOT VERY HARD

## 2024-10-11 ASSESSMENT — PATIENT HEALTH QUESTIONNAIRE - PHQ9
3. TROUBLE FALLING OR STAYING ASLEEP: NOT AT ALL
4. FEELING TIRED OR HAVING LITTLE ENERGY: NOT AT ALL
2. FEELING DOWN, DEPRESSED OR HOPELESS: NOT AT ALL
9. THOUGHTS THAT YOU WOULD BE BETTER OFF DEAD, OR OF HURTING YOURSELF: NOT AT ALL
7. TROUBLE CONCENTRATING ON THINGS, SUCH AS READING THE NEWSPAPER OR WATCHING TELEVISION: NOT AT ALL
1. LITTLE INTEREST OR PLEASURE IN DOING THINGS: NOT AT ALL
SUM OF ALL RESPONSES TO PHQ QUESTIONS 1-9: 0
5. POOR APPETITE OR OVEREATING: NOT AT ALL
SUM OF ALL RESPONSES TO PHQ QUESTIONS 1-9: 0
SUM OF ALL RESPONSES TO PHQ9 QUESTIONS 1 & 2: 0
10. IF YOU CHECKED OFF ANY PROBLEMS, HOW DIFFICULT HAVE THESE PROBLEMS MADE IT FOR YOU TO DO YOUR WORK, TAKE CARE OF THINGS AT HOME, OR GET ALONG WITH OTHER PEOPLE: NOT DIFFICULT AT ALL
6. FEELING BAD ABOUT YOURSELF - OR THAT YOU ARE A FAILURE OR HAVE LET YOURSELF OR YOUR FAMILY DOWN: NOT AT ALL
SUM OF ALL RESPONSES TO PHQ QUESTIONS 1-9: 0
SUM OF ALL RESPONSES TO PHQ QUESTIONS 1-9: 0
8. MOVING OR SPEAKING SO SLOWLY THAT OTHER PEOPLE COULD HAVE NOTICED. OR THE OPPOSITE, BEING SO FIGETY OR RESTLESS THAT YOU HAVE BEEN MOVING AROUND A LOT MORE THAN USUAL: NOT AT ALL

## 2024-10-11 ASSESSMENT — ENCOUNTER SYMPTOMS
SHORTNESS OF BREATH: 0
EYE REDNESS: 0
WHEEZING: 0
SORE THROAT: 1
SINUS PRESSURE: 0
EYE DISCHARGE: 0
COUGH: 1
VOICE CHANGE: 0
CHEST TIGHTNESS: 0

## 2024-10-11 NOTE — PROGRESS NOTES
[facility] PHYSICIANS [facility], [facility] WALK IN CARE  [address]  [facility] [address]  Dept: [phone]  Dept Fax: [phone]     Cecilia Ellis is a 89 y.o. female who presents to the urgent care today for her medicalconditions/complaints as noted below.  Cecilia Ellis is c/o of Cough (Cough three weeks does feel fatigue  did covid test negative )    HPI:      Cough  This is a new problem. The current episode started 1 to 4 weeks ago. The problem has been gradually worsening. The cough is Non-productive (productive at times). Associated symptoms include nasal congestion, postnasal drip and a sore throat (resolved). Pertinent negatives include no chest pain, chills, ear pain, eye redness, fever, headaches, myalgias, rash, shortness of breath or wheezing. Treatments tried: otc tx. The treatment provided no relief.     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    Wears glasses       Current Outpatient Medications   Medication Sig Dispense Refill    cefdinir (OMNICEF) 300 MG capsule Take 1 capsule by mouth 2 times daily for 10 days 14 capsule 0    azelastine (ASTELIN) 0.1 % nasal spray 2 sprays by Nasal route 2 times daily Use in each nostril as directed 30 mL 0    fluticasone (FLONASE) 50 MCG/ACT nasal spray 2 sprays by Each Nostril route daily 32 g 0    vitamin B-12 (CYANOCOBALAMIN) 100 MCG tablet Take 0.5 tablets by mouth daily      vitamin D3 (CHOLECALCIFEROL) 125 MCG (5000 UT) TABS tablet Take 1 tablet by mouth daily      NONFORMULARY CBD oil prn      acetaminophen (TYLENOL) 500 MG

## 2024-10-28 ENCOUNTER — HOSPITAL ENCOUNTER (OUTPATIENT)
Dept: CT IMAGING | Age: 89
Discharge: HOME | End: 2024-10-30
Payer: MEDICARE

## 2024-10-28 DIAGNOSIS — B96.89 ACUTE BACTERIAL SINUSITIS: ICD-10-CM

## 2024-10-28 DIAGNOSIS — Z87.09: ICD-10-CM

## 2024-10-28 DIAGNOSIS — J01.90 ACUTE BACTERIAL SINUSITIS: ICD-10-CM

## 2024-10-28 PROCEDURE — 70486 CT MAXILLOFACIAL W/O DYE: CPT

## 2024-11-25 ENCOUNTER — OFFICE VISIT (OUTPATIENT)
Dept: PRIMARY CARE CLINIC | Age: 88
End: 2024-11-25
Payer: MEDICARE

## 2024-11-25 VITALS
HEART RATE: 86 BPM | SYSTOLIC BLOOD PRESSURE: 146 MMHG | OXYGEN SATURATION: 98 % | TEMPERATURE: 97.3 F | DIASTOLIC BLOOD PRESSURE: 74 MMHG

## 2024-11-25 DIAGNOSIS — J02.9 SORE THROAT: ICD-10-CM

## 2024-11-25 DIAGNOSIS — J02.0 ACUTE STREPTOCOCCAL PHARYNGITIS: Primary | ICD-10-CM

## 2024-11-25 PROCEDURE — 1036F TOBACCO NON-USER: CPT | Performed by: NURSE PRACTITIONER

## 2024-11-25 PROCEDURE — 1123F ACP DISCUSS/DSCN MKR DOCD: CPT | Performed by: NURSE PRACTITIONER

## 2024-11-25 PROCEDURE — G8420 CALC BMI NORM PARAMETERS: HCPCS | Performed by: NURSE PRACTITIONER

## 2024-11-25 PROCEDURE — 1160F RVW MEDS BY RX/DR IN RCRD: CPT | Performed by: NURSE PRACTITIONER

## 2024-11-25 PROCEDURE — G8427 DOCREV CUR MEDS BY ELIG CLIN: HCPCS | Performed by: NURSE PRACTITIONER

## 2024-11-25 PROCEDURE — 1159F MED LIST DOCD IN RCRD: CPT | Performed by: NURSE PRACTITIONER

## 2024-11-25 PROCEDURE — 1090F PRES/ABSN URINE INCON ASSESS: CPT | Performed by: NURSE PRACTITIONER

## 2024-11-25 PROCEDURE — G8484 FLU IMMUNIZE NO ADMIN: HCPCS | Performed by: NURSE PRACTITIONER

## 2024-11-25 PROCEDURE — 99213 OFFICE O/P EST LOW 20 MIN: CPT | Performed by: NURSE PRACTITIONER

## 2024-11-25 PROCEDURE — 87880 STREP A ASSAY W/OPTIC: CPT | Performed by: NURSE PRACTITIONER

## 2024-11-25 RX ORDER — PREDNISONE 20 MG/1
40 TABLET ORAL DAILY
Qty: 10 TABLET | Refills: 0 | Status: SHIPPED | OUTPATIENT
Start: 2024-11-25 | End: 2024-11-30

## 2024-11-25 RX ORDER — AZITHROMYCIN 250 MG/1
TABLET, FILM COATED ORAL
Qty: 1 PACKET | Refills: 0 | Status: SHIPPED | OUTPATIENT
Start: 2024-11-25

## 2024-11-25 ASSESSMENT — ENCOUNTER SYMPTOMS
EYE DISCHARGE: 0
EYE REDNESS: 0
COUGH: 1
CHEST TIGHTNESS: 0
SORE THROAT: 1
VOICE CHANGE: 0
WHEEZING: 0
SINUS PRESSURE: 0
SHORTNESS OF BREATH: 0

## 2024-11-25 NOTE — PROGRESS NOTES
[facility] PHYSICIANS [facility], [facility] WALK IN CARE  [address]  [facility] [address]  Dept: [phone]     Cecilia Ellis is a 89 y.o. female who presents to the urgent care today for her medicalconditions/complaints as noted below.  Cecilia Ellis is c/o of Congestion (Head and chest congestion, cough x 4 days )    HPI:     Pharyngitis  This is a new problem. The current episode started in the past 7 days. The problem has been gradually worsening. Associated symptoms include congestion, coughing, fatigue and a sore throat. Pertinent negatives include no chest pain, chills, fever, headaches, myalgias, rash or weakness. The symptoms are aggravated by drinking, eating and swallowing. Treatments tried: otc tx. The treatment provided no relief.       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 1 packet 0    predniSONE (DELTASONE) 20 MG tablet Take 2 tablets by mouth daily for 5 days 10 tablet 0    fluticasone (FLONASE) 50 MCG/ACT nasal spray 2 sprays by Each Nostril route daily 32 g 0    vitamin B-12 (CYANOCOBALAMIN) 100 MCG tablet Take 0.5 tablets by mouth daily      vitamin D3 (CHOLECALCIFEROL) 125 MCG (5000 UT) TABS tablet Take 1 tablet by mouth daily      NONFORMULARY CBD oil prn      loratadine (CLARITIN) 10 MG tablet Take 1 tablet by mouth daily 14 tablet 0    nortriptyline (PAMELOR) 25 MG capsule

## 2024-11-26 ENCOUNTER — TELEPHONE (OUTPATIENT)
Dept: PRIMARY CARE CLINIC | Age: 88
End: 2024-11-26

## 2024-11-26 NOTE — TELEPHONE ENCOUNTER
Advised pt per Susannah that pt can take 1 pill a day or split up dose 1 in the morning and 1 in the afternoon. Pt voiced understanding.

## 2024-11-26 NOTE — TELEPHONE ENCOUNTER
Pt called and states that she took the prednisone last night and woke up this morning with her sugar at 150ish. She states she is never above 105. She would like to know if she can cut the dose in half to help her sugar. Please advise.

## 2025-05-09 ENCOUNTER — OFFICE VISIT (OUTPATIENT)
Dept: PRIMARY CARE CLINIC | Age: 89
End: 2025-05-09
Payer: MEDICARE

## 2025-05-09 ENCOUNTER — HOSPITAL ENCOUNTER (OUTPATIENT)
Age: 89
Setting detail: SPECIMEN
Discharge: HOME | End: 2025-05-09

## 2025-05-09 VITALS
HEART RATE: 101 BPM | SYSTOLIC BLOOD PRESSURE: 164 MMHG | TEMPERATURE: 96.7 F | OXYGEN SATURATION: 98 % | DIASTOLIC BLOOD PRESSURE: 75 MMHG

## 2025-05-09 DIAGNOSIS — R42 DIZZINESS: ICD-10-CM

## 2025-05-09 DIAGNOSIS — R53.83 FATIGUE, UNSPECIFIED TYPE: ICD-10-CM

## 2025-05-09 DIAGNOSIS — R79.89 OTHER SPECIFIED ABNORMAL FINDINGS OF BLOOD CHEMISTRY: ICD-10-CM

## 2025-05-09 DIAGNOSIS — R78.89 FINDING OF OTHER SPECIFIED SUBSTANCES, NOT NORMALLY FOUND IN BLOOD: ICD-10-CM

## 2025-05-09 DIAGNOSIS — J32.9 SINUSITIS, UNSPECIFIED CHRONICITY, UNSPECIFIED LOCATION: Primary | ICD-10-CM

## 2025-05-09 LAB
BILIRUBIN, POC: NEGATIVE
BLOOD URINE, POC: ABNORMAL
CHP ED QC CHECK: NORMAL
CLARITY, POC: CLEAR
COLOR, POC: YELLOW
ERYTHROCYTE [DISTWIDTH] IN BLOOD BY AUTOMATED COUNT: 13.7 % (ref 11.8–14.4)
FERRITIN SERPL-MCNC: 217 NG/ML
FOLATE SERPL-MCNC: 9.8 NG/ML (ref 4.8–24.2)
GLUCOSE BLD-MCNC: 133 MG/DL
GLUCOSE URINE, POC: NEGATIVE MG/DL
HCT VFR BLD AUTO: 38.2 % (ref 36.3–47.1)
HGB BLD-MCNC: 12.2 G/DL (ref 11.9–15.1)
IRON SATN MFR SERPL: 25 % (ref 20–55)
IRON SERPL-MCNC: 74 UG/DL (ref 37–145)
KETONES, POC: NEGATIVE MG/DL
LEUKOCYTE EST, POC: NEGATIVE
MCH RBC QN AUTO: 29.1 PG (ref 25.2–33.5)
MCHC RBC AUTO-ENTMCNC: 31.9 G/DL (ref 28.4–34.8)
MCV RBC AUTO: 91.2 FL (ref 82.6–102.9)
NITRITE, POC: NEGATIVE
NRBC BLD-RTO: 0 PER 100 WBC
PH, POC: 6
PLATELET # BLD AUTO: 271 K/UL (ref 138–453)
PMV BLD AUTO: 10.2 FL (ref 8.1–13.5)
PROTEIN, POC: ABNORMAL MG/DL
RBC # BLD AUTO: 4.19 M/UL (ref 3.95–5.11)
SPECIFIC GRAVITY, POC: 1.01
TIBC SERPL-MCNC: 294 UG/DL (ref 250–450)
UNSATURATED IRON BINDING CAPACITY: 220 UG/DL (ref 112–347)
UROBILINOGEN, POC: 0.2 MG/DL
WBC OTHER # BLD: 5.9 K/UL (ref 3.5–11.3)

## 2025-05-09 PROCEDURE — 99213 OFFICE O/P EST LOW 20 MIN: CPT

## 2025-05-09 PROCEDURE — 1090F PRES/ABSN URINE INCON ASSESS: CPT

## 2025-05-09 PROCEDURE — 1036F TOBACCO NON-USER: CPT

## 2025-05-09 PROCEDURE — G8420 CALC BMI NORM PARAMETERS: HCPCS

## 2025-05-09 PROCEDURE — 81002 URINALYSIS NONAUTO W/O SCOPE: CPT

## 2025-05-09 PROCEDURE — 1123F ACP DISCUSS/DSCN MKR DOCD: CPT

## 2025-05-09 PROCEDURE — 82962 GLUCOSE BLOOD TEST: CPT

## 2025-05-09 PROCEDURE — 1160F RVW MEDS BY RX/DR IN RCRD: CPT

## 2025-05-09 PROCEDURE — G8427 DOCREV CUR MEDS BY ELIG CLIN: HCPCS

## 2025-05-09 PROCEDURE — 1159F MED LIST DOCD IN RCRD: CPT

## 2025-05-09 RX ORDER — CEFDINIR 300 MG/1
300 CAPSULE ORAL 2 TIMES DAILY
Qty: 14 CAPSULE | Refills: 0 | Status: SHIPPED | OUTPATIENT
Start: 2025-05-09 | End: 2025-05-16

## 2025-05-09 RX ORDER — GABAPENTIN 100 MG/1
CAPSULE ORAL
COMMUNITY
Start: 2025-03-27

## 2025-05-09 RX ORDER — AMLODIPINE BESYLATE 5 MG/1
5 TABLET ORAL DAILY
COMMUNITY
Start: 2025-03-04

## 2025-05-09 RX ORDER — NORTRIPTYLINE HYDROCHLORIDE 10 MG/1
CAPSULE ORAL
COMMUNITY
Start: 2025-03-04

## 2025-05-09 ASSESSMENT — ENCOUNTER SYMPTOMS
EYE REDNESS: 0
SINUS PRESSURE: 0
NAUSEA: 0
EYE DISCHARGE: 0
VOMITING: 0
SINUS PAIN: 0
PHOTOPHOBIA: 0
EYE ITCHING: 0
WHEEZING: 0
GASTROINTESTINAL NEGATIVE: 1
CONSTIPATION: 0
RECTAL PAIN: 0
BLOOD IN STOOL: 0
ANAL BLEEDING: 0
VISUAL CHANGE: 0
CHEST TIGHTNESS: 0
EYES NEGATIVE: 1
STRIDOR: 0
EYE PAIN: 0
SORE THROAT: 0
BACK PAIN: 0
ABDOMINAL PAIN: 0
VOICE CHANGE: 0
COLOR CHANGE: 0
APNEA: 0
SHORTNESS OF BREATH: 1
DIARRHEA: 0
RHINORRHEA: 0
FACIAL SWELLING: 0
COUGH: 0
ABDOMINAL DISTENTION: 0
CHOKING: 0
TROUBLE SWALLOWING: 0

## 2025-05-09 NOTE — PROGRESS NOTES
[facility], [facility] WALK IN CARE  [address]  [place] [address]    [facility] WALK IN CARE  [address]  [facility] [address]  Dept: [phone]     Cecilia Ellis is a 89 y.o. female Established patient, who presents to the walk-in clinic today with conditions/complaints as noted below:    Chief Complaint   Patient presents with    Dizziness     Feels off balance, fatigue x Tuesday         HPI:     Dizziness  Quality:  Vertigo  Severity:  Moderate  Timing:  Intermittent  Progression:  Waxing and waning  Chronicity:  New  Context: bending over    Context: not with bowel movement, not with ear pain, not with eye movement, not with head movement, not with inactivity, not with loss of consciousness, not with medication, not with physical activity, not when standing up and not when urinating    Relieved by:  Nothing  Worsened by:  Sitting upright  Ineffective treatments:  Change in position and lying down  Associated symptoms: shortness of breath (w activity)    Associated symptoms: no blood in stool, no chest pain, no diarrhea, no headaches, no hearing loss, no nausea, no palpitations, no syncope, no tinnitus, no vision changes, no vomiting and no weakness      Denies chest pain/SOB.     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

## 2025-05-10 ENCOUNTER — RESULTS FOLLOW-UP (OUTPATIENT)
Dept: PRIMARY CARE CLINIC | Age: 89
End: 2025-05-10

## 2025-06-11 ENCOUNTER — OFFICE VISIT (OUTPATIENT)
Dept: PRIMARY CARE CLINIC | Age: 89
End: 2025-06-11
Payer: MEDICARE

## 2025-06-11 VITALS
OXYGEN SATURATION: 98 % | SYSTOLIC BLOOD PRESSURE: 133 MMHG | HEART RATE: 95 BPM | TEMPERATURE: 98.1 F | DIASTOLIC BLOOD PRESSURE: 74 MMHG

## 2025-06-11 DIAGNOSIS — H66.91 RIGHT ACUTE OTITIS MEDIA: Primary | ICD-10-CM

## 2025-06-11 PROCEDURE — 1123F ACP DISCUSS/DSCN MKR DOCD: CPT | Performed by: NURSE PRACTITIONER

## 2025-06-11 PROCEDURE — 1036F TOBACCO NON-USER: CPT | Performed by: NURSE PRACTITIONER

## 2025-06-11 PROCEDURE — G8427 DOCREV CUR MEDS BY ELIG CLIN: HCPCS | Performed by: NURSE PRACTITIONER

## 2025-06-11 PROCEDURE — G8420 CALC BMI NORM PARAMETERS: HCPCS | Performed by: NURSE PRACTITIONER

## 2025-06-11 PROCEDURE — 99213 OFFICE O/P EST LOW 20 MIN: CPT | Performed by: NURSE PRACTITIONER

## 2025-06-11 PROCEDURE — 1090F PRES/ABSN URINE INCON ASSESS: CPT | Performed by: NURSE PRACTITIONER

## 2025-06-11 RX ORDER — CEFDINIR 300 MG/1
300 CAPSULE ORAL 2 TIMES DAILY
Qty: 14 CAPSULE | Refills: 0 | Status: CANCELLED | OUTPATIENT
Start: 2025-06-11 | End: 2025-06-18

## 2025-06-11 RX ORDER — CEFDINIR 300 MG/1
300 CAPSULE ORAL 2 TIMES DAILY
Qty: 20 CAPSULE | Refills: 0 | Status: SHIPPED | OUTPATIENT
Start: 2025-06-11 | End: 2025-06-21

## 2025-06-11 RX ORDER — AZELASTINE 1 MG/ML
2 SPRAY, METERED NASAL 2 TIMES DAILY
Qty: 30 ML | Refills: 0 | Status: SHIPPED | OUTPATIENT
Start: 2025-06-11

## 2025-06-11 RX ORDER — FLUTICASONE PROPIONATE 50 MCG
2 SPRAY, SUSPENSION (ML) NASAL DAILY
Qty: 32 G | Refills: 0 | Status: SHIPPED | OUTPATIENT
Start: 2025-06-11

## 2025-06-11 ASSESSMENT — ENCOUNTER SYMPTOMS
WHEEZING: 0
SORE THROAT: 1
SHORTNESS OF BREATH: 0
CHEST TIGHTNESS: 0
SINUS PRESSURE: 0
EYE DISCHARGE: 0
EYE REDNESS: 0
VOICE CHANGE: 0

## 2025-06-11 NOTE — PROGRESS NOTES
[facility] PHYSICIANS [facility], [facility] WALK IN [facility]  [address]  [facility] [address]  Dept: [phone]     Cecilia Ellis is a 89 y.o. female who presents to the urgent care today for her medicalconditions/complaints as noted below.  Cecilia Ellis is c/o of Ear Fullness (Right ear fullness, sore throat x 1 day )    HPI:     Ear Pain   There is pain in the right ear. This is a new problem. The current episode started yesterday. The problem has been gradually worsening. There has been no fever. Associated symptoms include coughing, rhinorrhea and a sore throat. Pertinent negatives include no ear discharge, headaches or rash. Treatments tried: otc tx. The treatment provided no relief.     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    Wears glasses       Current Outpatient Medications   Medication Sig Dispense Refill    azelastine (ASTELIN) 0.1 % nasal spray 2 sprays by Nasal route 2 times daily Use in each nostril as directed 30 mL 0    fluticasone (FLONASE) 50 MCG/ACT nasal spray 2 sprays by Each Nostril route daily 32 g 0    cefdinir (OMNICEF) 300 MG capsule Take 1 capsule by mouth 2 times daily for 10 days 20 capsule 0    amLODIPine (NORVASC) 5 MG tablet Take 1 tablet by mouth daily      vitamin B-12 (CYANOCOBALAMIN) 100 MCG tablet Take 0.5 tablets by mouth daily      azelastine (ASTELIN) 0.1 % nasal spray 2 sprays by Nasal route 2 times daily Use in each nostril as directed 30 mL 0    vitamin D3 (CHOLECALCIFEROL) 125 MCG (5000 UT) TABS tablet Take 1 tablet by mouth daily

## 2025-06-12 ASSESSMENT — ENCOUNTER SYMPTOMS
RHINORRHEA: 1
COUGH: 1

## 2025-07-03 ENCOUNTER — HOSPITAL ENCOUNTER (EMERGENCY)
Age: 89
Discharge: HOME | End: 2025-07-03
Payer: MEDICARE

## 2025-07-03 ENCOUNTER — APPOINTMENT (OUTPATIENT)
Dept: CT IMAGING | Age: 89
End: 2025-07-03
Payer: MEDICARE

## 2025-07-03 VITALS
SYSTOLIC BLOOD PRESSURE: 126 MMHG | DIASTOLIC BLOOD PRESSURE: 71 MMHG | TEMPERATURE: 97.5 F | RESPIRATION RATE: 18 BRPM | OXYGEN SATURATION: 94 % | HEART RATE: 98 BPM

## 2025-07-03 DIAGNOSIS — S16.1XXA STRAIN OF NECK MUSCLE, INITIAL ENCOUNTER: ICD-10-CM

## 2025-07-03 DIAGNOSIS — M43.6 TORTICOLLIS: Primary | ICD-10-CM

## 2025-07-03 DIAGNOSIS — M48.02 CERVICAL STENOSIS OF SPINE: ICD-10-CM

## 2025-07-03 LAB
ANION GAP SERPL CALCULATED.3IONS-SCNC: 13 MMOL/L (ref 9–16)
BASOPHILS # BLD: 0.12 K/UL (ref 0–0.2)
BASOPHILS NFR BLD: 2 % (ref 0–2)
BUN SERPL-MCNC: 34 MG/DL (ref 8–23)
CALCIUM SERPL-MCNC: 9.4 MG/DL (ref 8.2–9.6)
CHLORIDE SERPL-SCNC: 97 MMOL/L (ref 98–107)
CO2 SERPL-SCNC: 26 MMOL/L (ref 20–31)
CREAT SERPL-MCNC: 1.7 MG/DL (ref 0.5–0.9)
EOSINOPHIL # BLD: 0.04 K/UL (ref 0–0.44)
EOSINOPHILS RELATIVE PERCENT: 1 % (ref 1–4)
ERYTHROCYTE [DISTWIDTH] IN BLOOD BY AUTOMATED COUNT: 13.3 % (ref 11.8–14.4)
GFR, ESTIMATED: 27 ML/MIN/1.73M2
GLUCOSE SERPL-MCNC: 163 MG/DL (ref 75–121)
HCT VFR BLD AUTO: 36.4 % (ref 36.3–47.1)
HGB BLD-MCNC: 12.1 G/DL (ref 11.9–15.1)
IMM GRANULOCYTES # BLD AUTO: 0.06 K/UL (ref 0–0.3)
IMM GRANULOCYTES NFR BLD: 1 %
LYMPHOCYTES NFR BLD: 1.59 K/UL (ref 1.1–3.7)
LYMPHOCYTES RELATIVE PERCENT: 20 % (ref 24–43)
MAGNESIUM SERPL-MCNC: 2 MG/DL (ref 1.7–2.3)
MCH RBC QN AUTO: 29.8 PG (ref 25.2–33.5)
MCHC RBC AUTO-ENTMCNC: 33.2 G/DL (ref 28.4–34.8)
MCV RBC AUTO: 89.7 FL (ref 82.6–102.9)
MONOCYTES NFR BLD: 0.75 K/UL (ref 0.1–1.2)
MONOCYTES NFR BLD: 9 % (ref 3–12)
NEUTROPHILS NFR BLD: 67 % (ref 36–65)
NEUTS SEG NFR BLD: 5.5 K/UL (ref 1.5–8.1)
NRBC BLD-RTO: 0 PER 100 WBC
PLATELET # BLD AUTO: 243 K/UL (ref 138–453)
PMV BLD AUTO: 10.2 FL (ref 8.1–13.5)
POTASSIUM SERPL-SCNC: 3.6 MMOL/L (ref 3.7–5.3)
RBC # BLD AUTO: 4.06 M/UL (ref 3.95–5.11)
SODIUM SERPL-SCNC: 136 MMOL/L (ref 136–145)
SPECIMEN SOURCE: NORMAL
STREP A, MOLECULAR: NEGATIVE
WBC OTHER # BLD: 8.1 K/UL (ref 3.5–11.3)

## 2025-07-03 PROCEDURE — 2580000003 HC RX 258

## 2025-07-03 PROCEDURE — 2500000003 HC RX 250 WO HCPCS

## 2025-07-03 PROCEDURE — 72125 CT NECK SPINE W/O DYE: CPT

## 2025-07-03 PROCEDURE — 96375 TX/PRO/DX INJ NEW DRUG ADDON: CPT

## 2025-07-03 PROCEDURE — 6360000002 HC RX W HCPCS

## 2025-07-03 PROCEDURE — 83735 ASSAY OF MAGNESIUM: CPT

## 2025-07-03 PROCEDURE — 87651 STREP A DNA AMP PROBE: CPT

## 2025-07-03 PROCEDURE — 80048 BASIC METABOLIC PNL TOTAL CA: CPT

## 2025-07-03 PROCEDURE — 6370000000 HC RX 637 (ALT 250 FOR IP)

## 2025-07-03 PROCEDURE — 85025 COMPLETE CBC W/AUTO DIFF WBC: CPT

## 2025-07-03 PROCEDURE — 99284 EMERGENCY DEPT VISIT MOD MDM: CPT

## 2025-07-03 PROCEDURE — 96374 THER/PROPH/DIAG INJ IV PUSH: CPT

## 2025-07-03 RX ORDER — DEXAMETHASONE SODIUM PHOSPHATE 10 MG/ML
10 INJECTION, SOLUTION INTRAMUSCULAR; INTRAVENOUS ONCE
Status: COMPLETED | OUTPATIENT
Start: 2025-07-03 | End: 2025-07-03

## 2025-07-03 RX ORDER — ACETAMINOPHEN 500 MG
500 TABLET ORAL 4 TIMES DAILY PRN
Qty: 60 TABLET | Refills: 1 | Status: SHIPPED | OUTPATIENT
Start: 2025-07-03 | End: 2025-07-03

## 2025-07-03 RX ORDER — IBUPROFEN 600 MG/1
600 TABLET, FILM COATED ORAL EVERY 6 HOURS PRN
Qty: 120 TABLET | Refills: 0 | Status: SHIPPED | OUTPATIENT
Start: 2025-07-03 | End: 2025-07-03

## 2025-07-03 RX ORDER — 0.9 % SODIUM CHLORIDE 0.9 %
500 INTRAVENOUS SOLUTION INTRAVENOUS ONCE
Status: COMPLETED | OUTPATIENT
Start: 2025-07-03 | End: 2025-07-03

## 2025-07-03 RX ORDER — KETOROLAC TROMETHAMINE 15 MG/ML
15 INJECTION, SOLUTION INTRAMUSCULAR; INTRAVENOUS ONCE
Status: COMPLETED | OUTPATIENT
Start: 2025-07-03 | End: 2025-07-03

## 2025-07-03 RX ORDER — ACETAMINOPHEN 325 MG/1
650 TABLET ORAL ONCE
Status: COMPLETED | OUTPATIENT
Start: 2025-07-03 | End: 2025-07-03

## 2025-07-03 RX ORDER — ACETAMINOPHEN 500 MG
500 TABLET ORAL 4 TIMES DAILY PRN
Qty: 60 TABLET | Refills: 1 | Status: SHIPPED | OUTPATIENT
Start: 2025-07-03

## 2025-07-03 RX ORDER — LIDOCAINE 4 G/G
2 PATCH TOPICAL DAILY
Status: DISCONTINUED | OUTPATIENT
Start: 2025-07-03 | End: 2025-07-03 | Stop reason: HOSPADM

## 2025-07-03 RX ORDER — IBUPROFEN 600 MG/1
600 TABLET, FILM COATED ORAL EVERY 6 HOURS PRN
Qty: 120 TABLET | Refills: 0 | Status: SHIPPED | OUTPATIENT
Start: 2025-07-03

## 2025-07-03 RX ADMIN — ACETAMINOPHEN 650 MG: 325 TABLET ORAL at 16:30

## 2025-07-03 RX ADMIN — PANTOPRAZOLE SODIUM 40 MG: 40 INJECTION, POWDER, FOR SOLUTION INTRAVENOUS at 16:25

## 2025-07-03 RX ADMIN — DEXAMETHASONE SODIUM PHOSPHATE 10 MG: 10 INJECTION, SOLUTION INTRA-ARTICULAR; INTRALESIONAL; INTRAMUSCULAR; INTRAVENOUS; SOFT TISSUE at 16:23

## 2025-07-03 RX ADMIN — KETOROLAC TROMETHAMINE 15 MG: 15 INJECTION, SOLUTION INTRAMUSCULAR; INTRAVENOUS at 16:20

## 2025-07-03 RX ADMIN — SODIUM CHLORIDE 500 ML: 0.9 INJECTION, SOLUTION INTRAVENOUS at 15:59

## 2025-07-03 RX ADMIN — TIZANIDINE 4 MG: 4 TABLET ORAL at 16:30

## 2025-07-03 ASSESSMENT — ENCOUNTER SYMPTOMS
NAUSEA: 0
VOMITING: 0
CHEST TIGHTNESS: 0
ABDOMINAL PAIN: 0
SHORTNESS OF BREATH: 0

## 2025-07-03 ASSESSMENT — PAIN SCALES - GENERAL
PAINLEVEL_OUTOF10: 10
PAINLEVEL_OUTOF10: 10

## 2025-07-03 ASSESSMENT — PAIN - FUNCTIONAL ASSESSMENT: PAIN_FUNCTIONAL_ASSESSMENT: 0-10

## 2025-07-03 NOTE — ED PROVIDER NOTES
EMERGENCY DEPARTMENT ENCOUNTER    Pt Name: Cecilia Ellis  MRN: [id number]  Birthdate 4/22/1934  Date of evaluation: 7/3/25  CHIEF COMPLAINT       Chief Complaint   Patient presents with    Neck Pain     Pain with turning head. Feels like a stabbing pain. Pt saw chiropractor yesterday who told her to come to ED if pain doesn't get better      Pharyngitis     HISTORY OF PRESENT ILLNESS   HPI   Patient 89-year-old female medical history as below presents today secondary to neck pain.  Both right and left sided however is worse on the right.  Notes has been going on for approximately 1 day and she noticed it after awakening.  States the day prior was doing some repetitive movement with her arms in the pool but cannot think of any other inciting factor.  Does not have any headache.  Is worse with rotating her head to the right or the left.  Denies any fever or chills.  Denies any lymphadenopathy.  No difficulty swallowing or difficulty breathing.  Does feel like she has a funny sensation in her throat.  No known sick contacts.  Denies any trauma.  No falls.  No blood thinner utilization.  No recent infection or IV procedures.    Denies focal motor/sensory changes, coordination/balance changes, facial asymmetry, speech changes, hearing changes, vision changes         REVIEW OF SYSTEMS     Review of Systems   Constitutional:  Negative for chills and fever.   HENT:  Negative for congestion.    Respiratory:  Negative for chest tightness and shortness of breath.    Cardiovascular:  Negative for chest pain and palpitations.   Gastrointestinal:  Negative for abdominal pain, nausea and vomiting.   Musculoskeletal:  Negative for gait problem.   Skin:  Negative for rash and wound.   Neurological:  Negative for dizziness and headaches.     PASTMEDICAL HISTORY     Past Medical History:   Diagnosis Date    Anemia     Anxiety     anxiety, depression    Depression     Fibromyalgia     Gastritis     GERD (gastroesophageal reflux

## 2025-07-03 NOTE — ED NOTES
Daughter adds that pt was in a [facility] urgent care 2-3 weeks ago for a right inner ear infection. Daughter Jessica is requesting for us to reevaluate her ear as well.

## 2025-07-03 NOTE — DISCHARGE INSTRUCTIONS
If develop any shooting pain into bilateral upper arms or any weakness of bilateral upper arms or legs immediately return to ED.    Take medication as prescribed and help ensure proper positioning of head while sleeping.  Slow stretching to be beneficial.  Warm compresses.

## 2025-07-03 NOTE — ED NOTES
Here today with daughter, Jessica. States she was exercising in pool yesterday with a pool noodle. Afterwards, noted left lower jaw pain with posterior neck pain. Daughter felt that her left posterior neck was swollen yesterday. Today, the right posterior neck is swollen. Pt went to the chiropractor yesterday, who \"was afraid to touch me as my neck and shoulders were so tight with pain.\" Took Tylenol x2 last evening. Slept in [employer] recliner last night instead of her bed. Notes increased neck pain when applying pressure with hands down on a counter or a table. Neck pain increases with movement, radiating to left posterior neck.    Pt also notes having chills over the last 2 weeks. Felt warm yesterday, but was afebrile when she checked her temperature at home yesterday. Having sore throat \"and a weird feeling in my tongue. It no hurts too.\"    Dr Nava in to examine pt. Strep screen obtained per MD during exam. Discussing plan of care with patient and daughter.

## (undated) DEVICE — Device